# Patient Record
Sex: FEMALE | Race: WHITE | Employment: UNEMPLOYED
[De-identification: names, ages, dates, MRNs, and addresses within clinical notes are randomized per-mention and may not be internally consistent; named-entity substitution may affect disease eponyms.]

---

## 2017-12-05 PROBLEM — E55.9 VITAMIN D DEFICIENCY: Status: ACTIVE | Noted: 2017-12-05

## 2017-12-05 PROBLEM — E78.5 DYSLIPIDEMIA: Status: ACTIVE | Noted: 2017-12-05

## 2017-12-05 PROBLEM — R73.9 HYPERGLYCEMIA: Status: ACTIVE | Noted: 2017-12-05

## 2018-01-05 ENCOUNTER — NURSE TRIAGE (OUTPATIENT)
Dept: OTHER | Facility: CLINIC | Age: 53
End: 2018-01-05

## 2018-01-05 NOTE — TELEPHONE ENCOUNTER
Late Entry for Triage encounter called to [employer] [facility]. Please Refer to  for call information and disposition. Writer attempted patient contact x2.

## 2018-01-22 PROBLEM — J44.9 CHRONIC OBSTRUCTIVE PULMONARY DISEASE (HCC): Status: ACTIVE | Noted: 2018-01-22

## 2018-04-20 ENCOUNTER — TELEPHONE (OUTPATIENT)
Dept: FAMILY MEDICINE CLINIC | Age: 53
End: 2018-04-20

## 2018-05-09 RX ORDER — PREDNISONE 20 MG/1
40 TABLET ORAL DAILY
Qty: 10 TABLET | Refills: 0 | Status: SHIPPED | OUTPATIENT
Start: 2018-05-09 | End: 2018-05-14

## 2018-06-07 ENCOUNTER — HOSPITAL ENCOUNTER (OUTPATIENT)
Dept: GENERAL RADIOLOGY | Age: 53
Discharge: HOME | End: 2018-06-09
Payer: COMMERCIAL

## 2018-06-07 DIAGNOSIS — Z12.39 BREAST CANCER SCREENING: ICD-10-CM

## 2018-06-07 PROCEDURE — 77067 SCR MAMMO BI INCL CAD: CPT

## 2018-07-17 ENCOUNTER — HOSPITAL ENCOUNTER (OUTPATIENT)
Age: 53
Discharge: HOME | End: 2018-07-17
Payer: COMMERCIAL

## 2018-07-17 DIAGNOSIS — J44.9 CHRONIC OBSTRUCTIVE PULMONARY DISEASE, UNSPECIFIED COPD TYPE (HCC): ICD-10-CM

## 2018-07-17 DIAGNOSIS — E55.9 VITAMIN D DEFICIENCY: ICD-10-CM

## 2018-07-17 DIAGNOSIS — E78.5 DYSLIPIDEMIA: ICD-10-CM

## 2018-07-17 LAB
ALBUMIN SERPL-MCNC: 4.3 G/DL (ref 3.5–5.2)
ALP BLD-CCNC: 44 U/L (ref 35–104)
ALT SERPL-CCNC: 15 U/L (ref 0–32)
ANION GAP SERPL CALCULATED.3IONS-SCNC: 10 MMOL/L (ref 7–16)
AST SERPL-CCNC: 19 U/L (ref 0–31)
BILIRUB SERPL-MCNC: 0.3 MG/DL (ref 0–1.2)
BUN BLDV-MCNC: 22 MG/DL (ref 6–20)
CALCIUM SERPL-MCNC: 9.4 MG/DL (ref 8.6–10.2)
CHLORIDE BLD-SCNC: 107 MMOL/L (ref 98–107)
CHOLESTEROL, TOTAL: 154 MG/DL (ref 0–199)
CO2: 26 MMOL/L (ref 22–29)
CREAT SERPL-MCNC: 0.9 MG/DL (ref 0.5–1)
GFR AFRICAN AMERICAN: >60
GFR NON-AFRICAN AMERICAN: >60 ML/MIN/1.73
GLUCOSE BLD-MCNC: 94 MG/DL (ref 74–109)
HCT VFR BLD CALC: 41.6 % (ref 34–48)
HDLC SERPL-MCNC: 39 MG/DL
HEMOGLOBIN: 14.1 G/DL (ref 11.5–15.5)
LDL CHOLESTEROL CALCULATED: 96 MG/DL (ref 0–99)
MCH RBC QN AUTO: 30.1 PG (ref 26–35)
MCHC RBC AUTO-ENTMCNC: 33.9 % (ref 32–34.5)
MCV RBC AUTO: 88.9 FL (ref 80–99.9)
PDW BLD-RTO: 12.4 FL (ref 11.5–15)
PLATELET # BLD: 202 E9/L (ref 130–450)
PMV BLD AUTO: 11.1 FL (ref 7–12)
POTASSIUM SERPL-SCNC: 4.1 MMOL/L (ref 3.5–5)
RBC # BLD: 4.68 E12/L (ref 3.5–5.5)
SODIUM BLD-SCNC: 143 MMOL/L (ref 132–146)
TOTAL PROTEIN: 6.9 G/DL (ref 6.4–8.3)
TRIGL SERPL-MCNC: 93 MG/DL (ref 0–149)
VLDLC SERPL CALC-MCNC: 19 MG/DL
WBC # BLD: 4.8 E9/L (ref 4.5–11.5)

## 2018-07-17 PROCEDURE — 85027 COMPLETE CBC AUTOMATED: CPT

## 2018-07-17 PROCEDURE — 80053 COMPREHEN METABOLIC PANEL: CPT

## 2018-07-17 PROCEDURE — 80061 LIPID PANEL: CPT

## 2018-07-17 PROCEDURE — 82785 ASSAY OF IGE: CPT

## 2018-07-17 PROCEDURE — 36415 COLL VENOUS BLD VENIPUNCTURE: CPT

## 2018-07-17 PROCEDURE — 86003 ALLG SPEC IGE CRUDE XTRC EA: CPT

## 2018-07-24 ENCOUNTER — OFFICE VISIT (OUTPATIENT)
Dept: FAMILY MEDICINE CLINIC | Age: 53
End: 2018-07-24
Payer: COMMERCIAL

## 2018-07-24 VITALS
SYSTOLIC BLOOD PRESSURE: 118 MMHG | DIASTOLIC BLOOD PRESSURE: 70 MMHG | HEART RATE: 85 BPM | BODY MASS INDEX: 30.53 KG/M2 | HEIGHT: 66 IN | OXYGEN SATURATION: 97 % | WEIGHT: 190 LBS

## 2018-07-24 DIAGNOSIS — Z23 NEED FOR 23-POLYVALENT PNEUMOCOCCAL POLYSACCHARIDE VACCINE: ICD-10-CM

## 2018-07-24 DIAGNOSIS — Z91.030 BEE ALLERGY STATUS: ICD-10-CM

## 2018-07-24 DIAGNOSIS — R73.9 HYPERGLYCEMIA: ICD-10-CM

## 2018-07-24 DIAGNOSIS — G89.29 CHRONIC RIGHT-SIDED LOW BACK PAIN WITHOUT SCIATICA: ICD-10-CM

## 2018-07-24 DIAGNOSIS — E78.5 DYSLIPIDEMIA: Primary | ICD-10-CM

## 2018-07-24 DIAGNOSIS — E55.9 VITAMIN D DEFICIENCY: ICD-10-CM

## 2018-07-24 DIAGNOSIS — M54.50 CHRONIC RIGHT-SIDED LOW BACK PAIN WITHOUT SCIATICA: ICD-10-CM

## 2018-07-24 LAB
Lab: NORMAL
REPORT: NORMAL
THIS TEST SENT TO: NORMAL

## 2018-07-24 PROCEDURE — 99214 OFFICE O/P EST MOD 30 MIN: CPT | Performed by: FAMILY MEDICINE

## 2018-07-24 PROCEDURE — 90732 PPSV23 VACC 2 YRS+ SUBQ/IM: CPT | Performed by: FAMILY MEDICINE

## 2018-07-24 PROCEDURE — 90471 IMMUNIZATION ADMIN: CPT | Performed by: FAMILY MEDICINE

## 2018-07-24 RX ORDER — EPINEPHRINE 0.3 MG/.3ML
0.3 INJECTION SUBCUTANEOUS ONCE
Qty: 0.3 ML | Refills: 0 | Status: SHIPPED | OUTPATIENT
Start: 2018-07-24 | End: 2019-01-24

## 2018-07-24 RX ORDER — EPINEPHRINE 0.3 MG/.3ML
INJECTION SUBCUTANEOUS
Status: CANCELLED | OUTPATIENT
Start: 2018-07-24

## 2018-07-24 RX ORDER — PREGABALIN 100 MG/1
100 CAPSULE ORAL PRN
Qty: 30 CAPSULE | Refills: 2 | Status: SHIPPED | OUTPATIENT
Start: 2018-07-24 | End: 2019-04-17

## 2018-07-24 ASSESSMENT — ENCOUNTER SYMPTOMS
BACK PAIN: 1
ABDOMINAL PAIN: 0
BLOOD IN STOOL: 0
SHORTNESS OF BREATH: 0
WHEEZING: 0

## 2018-07-24 NOTE — PROGRESS NOTES
of pain. Admits to intermittent numbness down the right leg, none now. Denies leg weakness, saddle anesthesia, urinary symptoms. Review of Systems   Constitutional: Negative for chills, fatigue and unexpected weight change. Eyes: Visual disturbance: blurred. Respiratory: Negative for shortness of breath and wheezing. Cardiovascular: Negative for chest pain and palpitations. Gastrointestinal: Negative for abdominal pain and blood in stool. Endocrine: Negative for polydipsia, polyphagia and polyuria. Genitourinary: Negative for dysuria and frequency. Musculoskeletal: Positive for back pain. Neurological: Positive for numbness (left ankle). Negative for weakness. Psychiatric/Behavioral: Negative for dysphoric mood and sleep disturbance. The patient is not nervous/anxious. Health Maintenance Due   Topic Date Due    DTaP/Tdap/Td vaccine (1 - Tdap) 10/15/1985    Cervical cancer screen  10/15/1987    Shingles Vaccine (1 of 2 - 2 Dose Series) 10/15/2016     Pneumococcal: agreeable    Shingrix: 7/24/18    OBJECTIVE    /70 (Site: Right Arm, Position: Sitting, Cuff Size: Large Adult)   Pulse 85   Ht 5' 5.98\" (1.676 m)   Wt 190 lb (86.2 kg)   SpO2 97%   BMI 30.68 kg/m²     Wt Readings from Last 3 Encounters:   07/24/18 190 lb (86.2 kg)   02/07/18 211 lb (95.7 kg)   01/22/18 209 lb (94.8 kg)     Physical Exam   Constitutional: She is oriented to person, place, and time. No distress. HENT:   Head: Normocephalic and atraumatic. Right Ear: External ear normal.   Left Ear: External ear normal.   Nose: Nose normal.   Mouth/Throat: Oropharynx is clear and moist.   Eyes: Conjunctivae are normal.   Neck: Neck supple. No thyromegaly present. Cardiovascular: Normal rate, regular rhythm, normal heart sounds and intact distal pulses. Pulmonary/Chest: Effort normal and breath sounds normal. No respiratory distress. She has no rhonchi. Abdominal: Soft.  Bowel sounds are normal. She exhibits no mass. There is no tenderness. Musculoskeletal: Normal range of motion. She exhibits no edema. Lumbar back: She exhibits tenderness. She exhibits no deformity and no spasm. Neurological: She is alert and oriented to person, place, and time. She has normal strength and normal reflexes. No cranial nerve deficit or sensory deficit. Skin: Skin is warm and dry. She is not diaphoretic. Psychiatric: She has a normal mood and affect. Her speech is normal.   Osteopathic: Normal kyphotic and lordotic curves. No acute somatic dysfunction of the cervical, thoracic, or lumbar spine. ASSESSMENT AND PLAN    1. Dyslipidemia  Significant improvement since improving diet, weight loss. Recommend that she decrease red yeast rice extract dose by 50%. Continue exercise and control diet. Obtain relevant lab work for review next visit. - CBC Auto Differential; Future  - Comprehensive Metabolic Panel; Future  - Lipid Panel; Future    2. Vitamin D deficiency  Controlled, continue current treatment. - Comprehensive Metabolic Panel; Future    3. Need for 23-polyvalent pneumococcal polysaccharide vaccine  - Pneumococcal polysaccharide vaccine 23-valent greater than or equal to 1yo subcutaneous/IM    4. Hyperglycemia  Obtain relevant lab work for review next visit. - Comprehensive Metabolic Panel; Future    5. Chronic right-sided low back pain without sciatica  Continue current treatment for now and refer to pain management  - pregabalin (LYRICA) 100 MG capsule; Take 1 capsule by mouth as needed (back pain) for up to 90 days. .  Dispense: 30 capsule; Refill: 2  - Melissa Hutchinson MD    6. Bee allergy status  Refill EpiPen per patient request  - EPINEPHrine (EPIPEN 2-YURIY) 0.3 MG/0.3ML SOAJ injection; Inject 0.3 mLs into the muscle once for 1 dose Use as directed for allergic reaction  Dispense: 0.3 mL; Refill: 0    Return in about 6 months (around 1/24/2019) for lab review, or sooner as needed.     Call or go to ED immediately if symptoms worsen or persist.    Educational materials and/or home exercises printed for patient's review and were included in patient instructions on his/her After Visit Summary and given to patient at the end of visit.       Counseled regarding above diagnosis, including possible risks and complications, especially if left uncontrolled.     Counseled regarding the possible side effects, risks, benefits and alternatives to treatment; patient and/or guardian verbalizes understanding, agrees, feels comfortable with and wishes to proceed with above treatment plan.     Advised patient to call with any new medication issues, and read all Rx info from pharmacy to assure aware of all possible risks and side effects of medication before taking.     Reviewed age and gender appropriate health screening exams and vaccinations. Advised patient regarding importance of keeping up with recommended health maintenance and to schedule as soon as possible if overdue, as this is important in assessing for undiagnosed pathology, especially cancer, as well as protecting against potentially harmful/life threatening disease.       Black Ivory,   07/24/18  9:24 PM

## 2018-08-10 ENCOUNTER — OFFICE VISIT (OUTPATIENT)
Dept: PAIN MANAGEMENT | Age: 53
End: 2018-08-10
Payer: COMMERCIAL

## 2018-08-10 ENCOUNTER — HOSPITAL ENCOUNTER (OUTPATIENT)
Dept: GENERAL RADIOLOGY | Age: 53
Discharge: HOME | End: 2018-08-12
Payer: COMMERCIAL

## 2018-08-10 ENCOUNTER — HOSPITAL ENCOUNTER (OUTPATIENT)
Age: 53
Discharge: HOME | End: 2018-08-12
Payer: COMMERCIAL

## 2018-08-10 VITALS
HEART RATE: 84 BPM | SYSTOLIC BLOOD PRESSURE: 120 MMHG | DIASTOLIC BLOOD PRESSURE: 72 MMHG | WEIGHT: 184 LBS | RESPIRATION RATE: 18 BRPM | HEIGHT: 66 IN | BODY MASS INDEX: 29.57 KG/M2 | TEMPERATURE: 98.3 F

## 2018-08-10 DIAGNOSIS — M47.816 LUMBAR SPONDYLOSIS: ICD-10-CM

## 2018-08-10 DIAGNOSIS — M51.9 LUMBAR DISC DISORDER: ICD-10-CM

## 2018-08-10 DIAGNOSIS — M47.816 LUMBAR FACET ARTHROPATHY: Primary | ICD-10-CM

## 2018-08-10 DIAGNOSIS — G89.4 CHRONIC PAIN SYNDROME: ICD-10-CM

## 2018-08-10 DIAGNOSIS — M47.816 LUMBAR FACET ARTHROPATHY: ICD-10-CM

## 2018-08-10 PROCEDURE — 72100 X-RAY EXAM L-S SPINE 2/3 VWS: CPT

## 2018-08-10 PROCEDURE — 99204 OFFICE O/P NEW MOD 45 MIN: CPT | Performed by: PAIN MEDICINE

## 2018-08-10 PROCEDURE — 99203 OFFICE O/P NEW LOW 30 MIN: CPT | Performed by: PAIN MEDICINE

## 2018-08-10 NOTE — PROGRESS NOTES
8/10/2018    Windy Murillo presents to the [address] [place] [address] on 8/10/2018. Lauro Fernandes is complaining of pain in the lower back . The pain is constant. The pain is described as stabbing. Pain is rated today at a 6 on the VAS scale. She took her last dose of lyrica  on 08/09/2018.       She has not been on anticoagulation medication  /72   Pulse 84   Temp 98.3 °F (36.8 °C)   Resp 18   Ht 5' 6\" (1.676 m)   Wt 184 lb (83.5 kg)   BMI 29.70 kg/m²

## 2018-08-10 NOTE — PROGRESS NOTES
[address]        [address] [facility], [address] [facility], [place]      [phone]          Consult Note      Patient:  Mikayla Guerrero DOB 1965    Date of Service:  8/10/18    Requesting Physician:  Fran Maxwell DO    Reason for Consult:      Patient presents with complaints of low back pain that started 4 years ago and has been the same    HISTORY OF PRESENT ILLNESS:      Pain is constant and is described as aching and stabbing. Pain does radiate to right thigh (posterior aspect) down to the knee . She  does not have numbness, tingling, weakness and does not have bladder or bowel dysfunction. Alleviating factors include: ice, OTC NSAIDS and excercise. Aggravating factors include:  sitting. She has not been on anticoagulation medications to include none and has not been on herbal supplements. She is not diabetic. Imaging: none     Previous treatments: Physical Therapy and medications. .      Past Medical History:   Diagnosis Date    Anxiety     Asthma     Depression     GERD (gastroesophageal reflux disease)        Past Surgical History:   Procedure Laterality Date    ANKLE SURGERY Left     APPENDECTOMY  1986    GALLBLADDER SURGERY      HYSTERECTOMY      SHOULDER SURGERY Right        Prior to Admission medications    Medication Sig Start Date End Date Taking? Authorizing Provider   pregabalin (LYRICA) 100 MG capsule Take 1 capsule by mouth as needed (back pain) for up to 90 days. . 7/24/18 10/22/18 Yes Janet Kussmaul,    pantoprazole (PROTONIX) 40 MG tablet Take 1 tablet by mouth daily 18  Yes Jass Bui, DO   budesonide-formoterol (SYMBICORT) 160-4.5 MCG/ACT AERO Inhale 2 puffs into the lungs 2 times daily 18  Yes Jass Bui DO   albuterol sulfate HFA (PROVENTIL HFA) 108 (90 Base) MCG/ACT inhaler Inhale 2 puffs into the lungs every 6 hours as needed for Wheezing 18  Yes Janet Kussmaul, DO traZODone (DESYREL) 100 MG tablet Take 100 mg by mouth 2 times daily   Yes Historical Provider, MD   Coenzyme Q10 (COQ-10) 200 MG CAPS Take by mouth   Yes Historical Provider, MD   Red Yeast Rice 600 MG CAPS Take by mouth   Yes Historical Provider, MD   Lactobacillus (ACIDOPHILUS/PECTIN PO) Take by mouth   Yes Historical Provider, MD   Omega-3 Fatty Acids (FISH OIL) 1200 MG CPDR Take by mouth   Yes Historical Provider, MD   Cholecalciferol (VITAMIN D3) 2000 units CAPS Take by mouth   Yes Historical Provider, MD   EPINEPHrine (EPIPEN 2-YURIY) 0.3 MG/0.3ML SOAJ injection as needed. 5/10/11  Yes Historical Provider, MD   venlafaxine (EFFEXOR XR) 75 MG extended release capsule Take 225 mg by mouth 11/6/12  Yes Historical Provider, MD   busPIRone (BUSPAR) 15 MG tablet Take 15 mg by mouth 3 times daily   Yes Historical Provider, MD   LORazepam (ATIVAN) 1 MG tablet Take 1 mg by mouth every 6 hours as needed for Anxiety .    Yes Historical Provider, MD   EPINEPHrine (EPIPEN 2-YURIY) 0.3 MG/0.3ML SOAJ injection Inject 0.3 mLs into the muscle once for 1 dose Use as directed for allergic reaction 7/24/18 7/24/18  Jass Bui DO   fluticasone (FLONASE) 50 MCG/ACT nasal spray 1 spray by Nasal route daily One spray each nostril daily 1/22/18   Jass Bui DO   predniSONE (DELTASONE) 20 MG tablet Take 20 mg by mouth daily    Historical Provider, MD       Allergies   Allergen Reactions    Sulfa Antibiotics Itching    Vancomycin Itching    Penicillins Rash       Social History     Social History    Marital status:      Spouse name: N/A    Number of children: 2    Years of education: N/A     Occupational History   [employer]     Social History Main Topics    Smoking status: Never Smoker    Smokeless tobacco: Never Used    Alcohol use Yes    Drug use: No    Sexual activity: Not on file     Other Topics Concern    Not on file     Social History Narrative    No narrative on file Right Gastrocnemius5/5    Left Gastrocnemius5/5  Right Ant Tibialis5/5  Left Ant Tibialis5/5  Reflexes:    Right Quadriceps reflex2+  Left Quadriceps reflex2+  Right Achilles reflex2+  Left Achilles reflex2+  Gait:normal    Dermatology:    Skin:no unusual rashes and no skin lesions    Assessment/Plan:  Chronic low back pain with radiation to the Right thigh (posterior aspect) down to the knee, axial in nature   Will schedule patient for lumbar spine Xray, will consider diagnostic Right L3,4 MB and L5 DR block  Patient is currently on Lyrica and she is taking PRN, discussed with the patient that it needs to be taken consistently everyday  Continue with the OTC pain medications   Urine screen today  OARRS report reviewed  Patient encouraged to stay active and to lose weight  Treatment plan discussed with the patient including medications and procedure side effects       ccreferring phyllis Tubbs M.D.

## 2018-08-20 ENCOUNTER — OFFICE VISIT (OUTPATIENT)
Dept: PAIN MANAGEMENT | Age: 53
End: 2018-08-20
Payer: COMMERCIAL

## 2018-08-20 ENCOUNTER — PREP FOR PROCEDURE (OUTPATIENT)
Dept: PAIN MANAGEMENT | Age: 53
End: 2018-08-20

## 2018-08-20 VITALS
DIASTOLIC BLOOD PRESSURE: 76 MMHG | OXYGEN SATURATION: 97 % | HEART RATE: 80 BPM | SYSTOLIC BLOOD PRESSURE: 116 MMHG | TEMPERATURE: 98.5 F | RESPIRATION RATE: 16 BRPM

## 2018-08-20 DIAGNOSIS — M47.816 LUMBAR SPONDYLOSIS: ICD-10-CM

## 2018-08-20 DIAGNOSIS — M47.816 LUMBAR FACET ARTHROPATHY: Primary | ICD-10-CM

## 2018-08-20 DIAGNOSIS — G89.4 CHRONIC PAIN SYNDROME: ICD-10-CM

## 2018-08-20 DIAGNOSIS — M51.9 LUMBAR DISC DISORDER: ICD-10-CM

## 2018-08-20 PROCEDURE — 99213 OFFICE O/P EST LOW 20 MIN: CPT | Performed by: PAIN MEDICINE

## 2018-08-20 PROCEDURE — 99214 OFFICE O/P EST MOD 30 MIN: CPT | Performed by: PAIN MEDICINE

## 2018-08-20 RX ORDER — SODIUM CHLORIDE 0.9 % (FLUSH) 0.9 %
10 SYRINGE (ML) INJECTION EVERY 12 HOURS SCHEDULED
Status: CANCELLED | OUTPATIENT
Start: 2018-08-20 | End: 2019-08-20

## 2018-08-20 RX ORDER — SODIUM CHLORIDE 0.9 % (FLUSH) 0.9 %
10 SYRINGE (ML) INJECTION PRN
Status: CANCELLED | OUTPATIENT
Start: 2018-08-20 | End: 2019-08-20

## 2018-08-20 NOTE — PROGRESS NOTES
2018    Arti  presents to the [address] [place] [address] on 2018. Aisha Whitehead is complaining of pain lower R back. . The pain is constant. The pain is described as aching, throbbing and stabbing. Pain is rated today at a 4 on the VAS scale.  She took her last dose of Advil 800 mg this AM.      She has not been on anticoagulation medications to include none and is managed by NA.      /76   Pulse 80   Temp 98.5 °F (36.9 °C) (Oral)   Resp 16   SpO2 97%

## 2018-08-20 NOTE — PROGRESS NOTES
lungs every 6 hours as needed for Wheezing 1/22/18  Yes Alicia Carrillo,    traZODone (DESYREL) 100 MG tablet Take 100 mg by mouth 2 times daily   Yes Historical Provider, MD   predniSONE (DELTASONE) 20 MG tablet Take 20 mg by mouth daily   Yes Historical Provider, MD   Coenzyme Q10 (COQ-10) 200 MG CAPS Take by mouth   Yes Historical Provider, MD   Red Yeast Rice 600 MG CAPS Take by mouth   Yes Historical Provider, MD   Lactobacillus (ACIDOPHILUS/PECTIN PO) Take by mouth   Yes Historical Provider, MD   Omega-3 Fatty Acids (FISH OIL) 1200 MG CPDR Take by mouth   Yes Historical Provider, MD   Cholecalciferol (VITAMIN D3) 2000 units CAPS Take by mouth   Yes Historical Provider, MD   EPINEPHrine (EPIPEN 2-YURIY) 0.3 MG/0.3ML SOAJ injection as needed. 5/10/11  Yes Historical Provider, MD   venlafaxine (EFFEXOR XR) 75 MG extended release capsule Take 225 mg by mouth 11/6/12  Yes Historical Provider, MD   busPIRone (BUSPAR) 15 MG tablet Take 15 mg by mouth 3 times daily   Yes Historical Provider, MD   LORazepam (ATIVAN) 1 MG tablet Take 1 mg by mouth every 6 hours as needed for Anxiety .    Yes Historical Provider, MD   EPINEPHrine (EPIPEN 2-YURIY) 0.3 MG/0.3ML SOAJ injection Inject 0.3 mLs into the muscle once for 1 dose Use as directed for allergic reaction 7/24/18 7/24/18  Jass Bui,        Allergies   Allergen Reactions    Sulfa Antibiotics Itching    Vancomycin Itching    Penicillins Rash       Social History     Social History    Marital status:      Spouse name: N/A    Number of children: 2    Years of education: N/A     Occupational History   [employer]     Social History Main Topics    Smoking status: Never Smoker    Smokeless tobacco: Never Used    Alcohol use Yes    Drug use: No    Sexual activity: Not on file     Other Topics Concern    Not on file     Social History Narrative    No narrative on file       Family History   Problem Relation Age of Onset    No Known Problems Mother    Mauro Tong Father 66        Colon    Other Father         AAA    Hypertension Father     Cancer Brother 47        Lung    COPD Brother        REVIEW OF SYSTEMS:     Tessa Marrufo denies fever/chills, chest pain, shortness of breath, new bowel or bladder complaints. All other review of systems was negative. PHYSICAL EXAMINATION:      /76   Pulse 80   Temp 98.5 °F (36.9 °C) (Oral)   Resp 16   SpO2 97%        General:       General appearance:   pleasant and well-hydrated. , in moderate distress and A & O x3  Build:Overweight  Function:Rises from a seated position easily.     HEENT:     Head:normocephalic and atraumatic  Pupils:regular, round and equal.  Sclera: icterus absent,      Lungs:     Breathing:Breathing Pattern: regular, no distress     Abdomen:     Shape:non-distended and normal  Tenderness:none  Guarding:none     Lumbar spine:     Spine inspection:normal   CVA tenderness:No   Palpation:tenderness paravertebral muscles, facet loading, right, positive and tenderness. Range of motion:abnormal moderately Lateral bending, flexion, extension rotation right and is  painful.     Musculoskeletal:     Trigger points in Paraveteral:absent bilaterally  SI joint tenderness:negative right, negative left              MILE test:not done right, not done             left  Piriformis tenderness:negative right, negative left  Trochanteric bursa tenderness:negative right, negative left  SLR:negative right, negative left, sitting      Extremities:     Tremors:None bilaterally upper and lower  Range of motion: pain with internal rotation of hips negative.   Intact:Yes  Varicose veins:absent   Edema:Normal     Neurological:     Sensory:normal to joint position sense and light touch bilateral lower extremities   Motor:                 Right Quadriceps5/5          Left Quadriceps5/5           Right Gastrocnemius5/5    Left Gastrocnemius5/5  Right Ant Tibialis5/5  Left Ant Tibialis5/5  Reflexes:

## 2018-08-21 ENCOUNTER — PREP FOR PROCEDURE (OUTPATIENT)
Dept: PAIN MANAGEMENT | Age: 53
End: 2018-08-21

## 2018-09-08 ENCOUNTER — HOSPITAL ENCOUNTER (OUTPATIENT)
Age: 53
Discharge: HOME | End: 2018-09-08
Payer: COMMERCIAL

## 2018-09-08 LAB
C-REACTIVE PROTEIN: 0.3 MG/DL (ref 0–0.4)
RHEUMATOID FACTOR: <10 IU/ML (ref 0–13)
SEDIMENTATION RATE, ERYTHROCYTE: 6 MM/HR (ref 0–20)

## 2018-09-08 PROCEDURE — 86431 RHEUMATOID FACTOR QUANT: CPT

## 2018-09-08 PROCEDURE — 86038 ANTINUCLEAR ANTIBODIES: CPT

## 2018-09-08 PROCEDURE — 86200 CCP ANTIBODY: CPT

## 2018-09-08 PROCEDURE — 86140 C-REACTIVE PROTEIN: CPT

## 2018-09-08 PROCEDURE — 36415 COLL VENOUS BLD VENIPUNCTURE: CPT

## 2018-09-08 PROCEDURE — 85651 RBC SED RATE NONAUTOMATED: CPT

## 2018-09-10 LAB — ANTI-NUCLEAR ANTIBODY (ANA): NEGATIVE

## 2018-09-11 LAB — CCP IGG ANTIBODIES: 3 UNITS (ref 0–19)

## 2018-09-20 DIAGNOSIS — J06.9 VIRAL URI: ICD-10-CM

## 2018-09-20 DIAGNOSIS — J44.9 CHRONIC OBSTRUCTIVE PULMONARY DISEASE, UNSPECIFIED COPD TYPE (HCC): ICD-10-CM

## 2018-09-20 RX ORDER — PANTOPRAZOLE SODIUM 40 MG/1
40 TABLET, DELAYED RELEASE ORAL DAILY
Qty: 90 TABLET | Refills: 1 | Status: SHIPPED | OUTPATIENT
Start: 2018-09-20 | End: 2019-01-24 | Stop reason: SDUPTHER

## 2018-09-20 RX ORDER — BUDESONIDE AND FORMOTEROL FUMARATE DIHYDRATE 160; 4.5 UG/1; UG/1
2 AEROSOL RESPIRATORY (INHALATION) 2 TIMES DAILY
Qty: 1 INHALER | Refills: 3 | Status: SHIPPED
Start: 2018-09-20 | End: 2020-02-13 | Stop reason: CLARIF

## 2018-09-20 NOTE — TELEPHONE ENCOUNTER
From: Tonya Bernal  Sent: 9/20/2018 11:58 AM EDT  Subject: Medication Renewal Request    Tonya Bernal would like a refill of the following medications:     budesonide-formoterol (SYMBICORT) 160-4.5 MCG/ACT AERO [Jass Bui, DO]     pantoprazole (PROTONIX) 40 MG tablet Camilla Yoo DO]    Preferred pharmacy: [address] [place] [address].  Betty Herzog [phone] - F [phone]    Comment:

## 2018-12-01 PROBLEM — G47.33 OSA (OBSTRUCTIVE SLEEP APNEA): Status: ACTIVE | Noted: 2018-12-01

## 2018-12-13 ENCOUNTER — OFFICE VISIT (OUTPATIENT)
Dept: FAMILY MEDICINE CLINIC | Age: 53
End: 2018-12-13
Payer: COMMERCIAL

## 2018-12-13 VITALS
OXYGEN SATURATION: 97 % | DIASTOLIC BLOOD PRESSURE: 72 MMHG | HEIGHT: 66 IN | SYSTOLIC BLOOD PRESSURE: 116 MMHG | BODY MASS INDEX: 28.93 KG/M2 | HEART RATE: 78 BPM | WEIGHT: 180 LBS

## 2018-12-13 DIAGNOSIS — G47.33 OSA (OBSTRUCTIVE SLEEP APNEA): Primary | ICD-10-CM

## 2018-12-13 PROCEDURE — 99213 OFFICE O/P EST LOW 20 MIN: CPT | Performed by: FAMILY MEDICINE

## 2018-12-13 ASSESSMENT — ENCOUNTER SYMPTOMS
WHEEZING: 0
SHORTNESS OF BREATH: 0

## 2018-12-13 ASSESSMENT — PATIENT HEALTH QUESTIONNAIRE - PHQ9
SUM OF ALL RESPONSES TO PHQ QUESTIONS 1-9: 0
SUM OF ALL RESPONSES TO PHQ9 QUESTIONS 1 & 2: 0
SUM OF ALL RESPONSES TO PHQ QUESTIONS 1-9: 0
1. LITTLE INTEREST OR PLEASURE IN DOING THINGS: 0
2. FEELING DOWN, DEPRESSED OR HOPELESS: 0

## 2019-01-18 ENCOUNTER — HOSPITAL ENCOUNTER (OUTPATIENT)
Age: 54
Discharge: HOME | End: 2019-01-20
Payer: COMMERCIAL

## 2019-01-18 DIAGNOSIS — E55.9 VITAMIN D DEFICIENCY: ICD-10-CM

## 2019-01-18 DIAGNOSIS — R73.9 HYPERGLYCEMIA: ICD-10-CM

## 2019-01-18 DIAGNOSIS — E78.5 DYSLIPIDEMIA: ICD-10-CM

## 2019-01-18 LAB
ALBUMIN SERPL-MCNC: 4.4 G/DL (ref 3.5–5.2)
ALP BLD-CCNC: 48 U/L (ref 35–104)
ALT SERPL-CCNC: 15 U/L (ref 0–32)
ANION GAP SERPL CALCULATED.3IONS-SCNC: 18 MMOL/L (ref 7–16)
AST SERPL-CCNC: 19 U/L (ref 0–31)
BASOPHILS ABSOLUTE: 0.03 E9/L (ref 0–0.2)
BASOPHILS RELATIVE PERCENT: 0.5 % (ref 0–2)
BILIRUB SERPL-MCNC: 0.3 MG/DL (ref 0–1.2)
BUN BLDV-MCNC: 21 MG/DL (ref 6–20)
CALCIUM SERPL-MCNC: 9 MG/DL (ref 8.6–10.2)
CHLORIDE BLD-SCNC: 105 MMOL/L (ref 98–107)
CHOLESTEROL, TOTAL: 176 MG/DL (ref 0–199)
CO2: 24 MMOL/L (ref 22–29)
CREAT SERPL-MCNC: 0.9 MG/DL (ref 0.5–1)
EOSINOPHILS ABSOLUTE: 0.23 E9/L (ref 0.05–0.5)
EOSINOPHILS RELATIVE PERCENT: 4 % (ref 0–6)
GFR AFRICAN AMERICAN: >60
GFR NON-AFRICAN AMERICAN: >60 ML/MIN/1.73
GLUCOSE BLD-MCNC: 92 MG/DL (ref 74–99)
HCT VFR BLD CALC: 42.4 % (ref 34–48)
HDLC SERPL-MCNC: 52 MG/DL
HEMOGLOBIN: 13.7 G/DL (ref 11.5–15.5)
IMMATURE GRANULOCYTES #: 0.02 E9/L
IMMATURE GRANULOCYTES %: 0.3 % (ref 0–5)
LDL CHOLESTEROL CALCULATED: 108 MG/DL (ref 0–99)
LYMPHOCYTES ABSOLUTE: 2.42 E9/L (ref 1.5–4)
LYMPHOCYTES RELATIVE PERCENT: 41.7 % (ref 20–42)
MCH RBC QN AUTO: 30.2 PG (ref 26–35)
MCHC RBC AUTO-ENTMCNC: 32.3 % (ref 32–34.5)
MCV RBC AUTO: 93.4 FL (ref 80–99.9)
MONOCYTES ABSOLUTE: 0.39 E9/L (ref 0.1–0.95)
MONOCYTES RELATIVE PERCENT: 6.7 % (ref 2–12)
NEUTROPHILS ABSOLUTE: 2.72 E9/L (ref 1.8–7.3)
NEUTROPHILS RELATIVE PERCENT: 46.8 % (ref 43–80)
PDW BLD-RTO: 12.7 FL (ref 11.5–15)
PLATELET # BLD: 220 E9/L (ref 130–450)
PMV BLD AUTO: 11.1 FL (ref 7–12)
POTASSIUM SERPL-SCNC: 4 MMOL/L (ref 3.5–5)
RBC # BLD: 4.54 E12/L (ref 3.5–5.5)
SODIUM BLD-SCNC: 147 MMOL/L (ref 132–146)
TOTAL PROTEIN: 6.8 G/DL (ref 6.4–8.3)
TRIGL SERPL-MCNC: 78 MG/DL (ref 0–149)
VLDLC SERPL CALC-MCNC: 16 MG/DL
WBC # BLD: 5.8 E9/L (ref 4.5–11.5)

## 2019-01-18 PROCEDURE — 36415 COLL VENOUS BLD VENIPUNCTURE: CPT

## 2019-01-18 PROCEDURE — 80053 COMPREHEN METABOLIC PANEL: CPT

## 2019-01-18 PROCEDURE — 85025 COMPLETE CBC W/AUTO DIFF WBC: CPT

## 2019-01-18 PROCEDURE — 80061 LIPID PANEL: CPT

## 2019-01-24 ENCOUNTER — OFFICE VISIT (OUTPATIENT)
Dept: FAMILY MEDICINE CLINIC | Age: 54
End: 2019-01-24
Payer: COMMERCIAL

## 2019-01-24 VITALS
BODY MASS INDEX: 27.8 KG/M2 | WEIGHT: 173 LBS | DIASTOLIC BLOOD PRESSURE: 68 MMHG | HEART RATE: 79 BPM | OXYGEN SATURATION: 96 % | HEIGHT: 66 IN | SYSTOLIC BLOOD PRESSURE: 112 MMHG

## 2019-01-24 DIAGNOSIS — Z76.0 MEDICATION REFILL: ICD-10-CM

## 2019-01-24 DIAGNOSIS — E78.5 DYSLIPIDEMIA: Primary | ICD-10-CM

## 2019-01-24 DIAGNOSIS — K21.9 GASTROESOPHAGEAL REFLUX DISEASE, ESOPHAGITIS PRESENCE NOT SPECIFIED: ICD-10-CM

## 2019-01-24 DIAGNOSIS — G47.33 OSA (OBSTRUCTIVE SLEEP APNEA): ICD-10-CM

## 2019-01-24 DIAGNOSIS — E55.9 VITAMIN D DEFICIENCY: ICD-10-CM

## 2019-01-24 PROCEDURE — 99214 OFFICE O/P EST MOD 30 MIN: CPT | Performed by: FAMILY MEDICINE

## 2019-01-24 RX ORDER — PREGABALIN 100 MG/1
100 CAPSULE ORAL 2 TIMES DAILY PRN
COMMUNITY
End: 2019-01-24 | Stop reason: SDUPTHER

## 2019-01-24 RX ORDER — PANTOPRAZOLE SODIUM 40 MG/1
40 TABLET, DELAYED RELEASE ORAL DAILY
Qty: 90 TABLET | Refills: 1 | Status: SHIPPED
Start: 2019-01-24 | End: 2020-02-13 | Stop reason: ALTCHOICE

## 2019-01-24 RX ORDER — PREGABALIN 75 MG/1
75 CAPSULE ORAL 2 TIMES DAILY
Qty: 180 CAPSULE | Refills: 1 | Status: SHIPPED | OUTPATIENT
Start: 2019-01-24 | End: 2019-07-25

## 2019-01-24 ASSESSMENT — ENCOUNTER SYMPTOMS
BACK PAIN: 1
ABDOMINAL PAIN: 0
SHORTNESS OF BREATH: 0
WHEEZING: 0
BLOOD IN STOOL: 0

## 2019-02-01 ENCOUNTER — HOSPITAL ENCOUNTER (OUTPATIENT)
Age: 54
Discharge: HOME | End: 2019-02-03
Payer: COMMERCIAL

## 2019-02-01 PROCEDURE — 88175 CYTOPATH C/V AUTO FLUID REDO: CPT

## 2019-02-06 ENCOUNTER — HOSPITAL ENCOUNTER (OUTPATIENT)
Dept: SLEEP CENTER | Age: 54
Discharge: HOME | End: 2019-02-06
Payer: COMMERCIAL

## 2019-02-06 DIAGNOSIS — G47.33 OSA (OBSTRUCTIVE SLEEP APNEA): Primary | ICD-10-CM

## 2019-02-06 PROCEDURE — 95811 POLYSOM 6/>YRS CPAP 4/> PARM: CPT

## 2019-04-17 ENCOUNTER — OFFICE VISIT (OUTPATIENT)
Dept: FAMILY MEDICINE CLINIC | Age: 54
End: 2019-04-17
Payer: COMMERCIAL

## 2019-04-17 VITALS
HEART RATE: 75 BPM | WEIGHT: 176 LBS | BODY MASS INDEX: 28.28 KG/M2 | DIASTOLIC BLOOD PRESSURE: 62 MMHG | HEIGHT: 66 IN | OXYGEN SATURATION: 98 % | SYSTOLIC BLOOD PRESSURE: 108 MMHG

## 2019-04-17 DIAGNOSIS — K21.9 GASTROESOPHAGEAL REFLUX DISEASE, ESOPHAGITIS PRESENCE NOT SPECIFIED: Primary | ICD-10-CM

## 2019-04-17 DIAGNOSIS — R10.13 EPIGASTRIC PAIN: ICD-10-CM

## 2019-04-17 PROCEDURE — 99213 OFFICE O/P EST LOW 20 MIN: CPT | Performed by: FAMILY MEDICINE

## 2019-04-17 PROCEDURE — 93000 ELECTROCARDIOGRAM COMPLETE: CPT | Performed by: FAMILY MEDICINE

## 2019-04-17 RX ORDER — RANITIDINE 150 MG/1
150 TABLET ORAL NIGHTLY
Qty: 30 TABLET | Refills: 5 | Status: SHIPPED | OUTPATIENT
Start: 2019-04-17 | End: 2019-04-24 | Stop reason: SDUPTHER

## 2019-04-17 RX ORDER — SUCRALFATE 1 G/1
TABLET ORAL
Qty: 90 TABLET | Refills: 0 | Status: SHIPPED | OUTPATIENT
Start: 2019-04-17 | End: 2019-04-24

## 2019-04-17 ASSESSMENT — ENCOUNTER SYMPTOMS
DIARRHEA: 0
BLOOD IN STOOL: 0
BACK PAIN: 1
WHEEZING: 0
ABDOMINAL PAIN: 1
SHORTNESS OF BREATH: 0

## 2019-04-17 ASSESSMENT — PATIENT HEALTH QUESTIONNAIRE - PHQ9
SUM OF ALL RESPONSES TO PHQ9 QUESTIONS 1 & 2: 1
SUM OF ALL RESPONSES TO PHQ QUESTIONS 1-9: 1
SUM OF ALL RESPONSES TO PHQ QUESTIONS 1-9: 1
1. LITTLE INTEREST OR PLEASURE IN DOING THINGS: 1
2. FEELING DOWN, DEPRESSED OR HOPELESS: 0

## 2019-04-17 NOTE — PATIENT INSTRUCTIONS
Patient Education        Gastroesophageal Reflux Disease (GERD): Care Instructions  Your Care Instructions    Gastroesophageal reflux disease (GERD) is the backward flow of stomach acid into the esophagus. The esophagus is the tube that leads from your throat to your stomach. A one-way valve prevents the stomach acid from moving up into this tube. When you have GERD, this valve does not close tightly enough. If you have mild GERD symptoms including heartburn, you may be able to control the problem with antacids or over-the-counter medicine. Changing your diet, losing weight, and making other lifestyle changes can also help reduce symptoms. Follow-up care is a key part of your treatment and safety. Be sure to make and go to all appointments, and call your doctor if you are having problems. It's also a good idea to know your test results and keep a list of the medicines you take. How can you care for yourself at home? · Take your medicines exactly as prescribed. Call your doctor if you think you are having a problem with your medicine. · Your doctor may recommend over-the-counter medicine. For mild or occasional indigestion, antacids, such as Tums, Gaviscon, Mylanta, or Maalox, may help. Your doctor also may recommend over-the-counter acid reducers, such as Pepcid AC, Tagamet HB, Zantac 75, or Prilosec. Read and follow all instructions on the label. If you use these medicines often, talk with your doctor. · Change your eating habits. ? It's best to eat several small meals instead of two or three large meals. ? After you eat, wait 2 to 3 hours before you lie down. ? Chocolate, mint, and alcohol can make GERD worse. ? Spicy foods, foods that have a lot of acid (like tomatoes and oranges), and coffee can make GERD symptoms worse in some people. If your symptoms are worse after you eat a certain food, you may want to stop eating that food to see if your symptoms get better.   · Do not smoke or chew tobacco. Smoking can make GERD worse. If you need help quitting, talk to your doctor about stop-smoking programs and medicines. These can increase your chances of quitting for good. · If you have GERD symptoms at night, raise the head of your bed 6 to 8 inches by putting the frame on blocks or placing a foam wedge under the head of your mattress. (Adding extra pillows does not work.)  · Do not wear tight clothing around your middle. · Lose weight if you need to. Losing just 5 to 10 pounds can help. When should you call for help? Call your doctor now or seek immediate medical care if:    · You have new or different belly pain.     · Your stools are black and tarlike or have streaks of blood.    Watch closely for changes in your health, and be sure to contact your doctor if:    · Your symptoms have not improved after 2 days.     · Food seems to catch in your throat or chest.   Where can you learn more? Go to https://[employer]. org and sign in to your [employer] account. Enter O936 in the [facility] box to learn more about \"Gastroesophageal Reflux Disease (GERD): Care Instructions. \"     If you do not have an account, please click on the \"Sign Up Now\" link. Current as of: March 27, 2018  Content Version: 11.9  © 4972-1964 [employer]. Care instructions adapted under license by [facility] [address] If you have questions about a medical condition or this instruction, always ask your healthcare professional. [place] [address] any warranty or liability for your use of this information.

## 2019-04-17 NOTE — PROGRESS NOTES
Adrián Rojas   Patient is a 47y.o. year old female who presents with:  Chief Complaint   Patient presents with    Gastroesophageal Reflux     Buring is worse X3 weeks.  Health Maintenance     Refused Dtap     HPI    Abdominal pain  Current treatment: pantoprazole 40mg daily   Recent changes in medication: none. Worsening over the past three weeks. Burning in character. Located at the epigastrium. TUMS had no effect, extra protonix was mildly helpful. Exacerbated with spicy, acidic foods. Worse after eating, drinking anything. Some pain in the back. Some diaphoresis last night. No blood in the stool, no diarrhea. No CP, leg swelling, palpitations. Taking IB 200mg x 4 once per day. Patient last had EGD 2010 which was unremarkable. Review of Systems   Constitutional: Positive for diaphoresis. Negative for chills, fatigue and unexpected weight change. Respiratory: Negative for shortness of breath and wheezing. Cardiovascular: Negative for chest pain, palpitations and leg swelling. Gastrointestinal: Positive for abdominal pain. Negative for blood in stool and diarrhea. Genitourinary: Negative for dysuria and frequency. Musculoskeletal: Positive for back pain. Health Maintenance Due   Topic Date Due    DTaP/Tdap/Td vaccine (1 - Tdap) 10/15/1985    Shingles Vaccine (1 of 2) 10/15/2016     Shingrix: discussed recommendation 7/24/18    OBJECTIVE    /62 (Site: Right Upper Arm, Position: Sitting, Cuff Size: Medium Adult)   Pulse 75   Ht 5' 6\" (1.676 m)   Wt 176 lb (79.8 kg)   SpO2 98%   BMI 28.41 kg/m²     Wt Readings from Last 3 Encounters:   04/17/19 176 lb (79.8 kg)   01/24/19 173 lb (78.5 kg)   12/13/18 180 lb (81.6 kg)     Physical Exam   Constitutional: She is oriented to person, place, and time. No distress. HENT:   Head: Normocephalic and atraumatic.    Right Ear: External ear normal.   Left Ear: External ear normal.   Eyes: Conjunctivae are normal. Neck: Neck supple. Carotid bruit is not present. No thyromegaly present. Cardiovascular: Normal rate, regular rhythm, normal heart sounds and intact distal pulses. Pulmonary/Chest: Effort normal and breath sounds normal. She has no rhonchi. Abdominal: Soft. Bowel sounds are normal. There is tenderness in the epigastric area. There is no rigidity and no guarding. Musculoskeletal: Normal range of motion. She exhibits no edema. Neurological: She is alert and oriented to person, place, and time. No cranial nerve deficit. Skin: Skin is warm and dry. She is not diaphoretic. Psychiatric: She has a normal mood and affect. Her speech is normal and behavior is normal.     ASSESSMENT AND PLAN    1. Gastroesophageal reflux disease, esophagitis presence not specified  Begin sucralfate x one month, begin ranitidine nightly. Avoid NSAIDs. Provided with relevant printed material. Discussed expected clinical course and s/s for which to call vs seek emergent medical attention. Pending course will consider need for EGD. - sucralfate (CARAFATE) 1 GM tablet; Take 1 tablet three times daily. Take one hour before meals. Dispense: 90 tablet; Refill: 0  - ranitidine (ZANTAC) 150 MG tablet; Take 1 tablet by mouth nightly  Dispense: 30 tablet; Refill: 5    2. Epigastric pain  - EKG 12 Lead; Future  - EKG 12 Lead    Return for follow-up from previous visit, or sooner as needed. , Discussed s/s for which to call vs go to ED. Ciarra Hurst DO  04/17/19  6:29 PM

## 2019-04-22 ENCOUNTER — TELEPHONE (OUTPATIENT)
Dept: FAMILY MEDICINE CLINIC | Age: 54
End: 2019-04-22

## 2019-04-22 DIAGNOSIS — R10.13 EPIGASTRIC PAIN: Primary | ICD-10-CM

## 2019-04-22 NOTE — TELEPHONE ENCOUNTER
Patient states that medications for GERD are not helping to relieve symptoms. She is requesting a referral to [facility] in [place].

## 2019-04-24 ENCOUNTER — HOSPITAL ENCOUNTER (EMERGENCY)
Age: 54
Discharge: HOME | End: 2019-04-24
Attending: EMERGENCY MEDICINE
Payer: COMMERCIAL

## 2019-04-24 ENCOUNTER — HOSPITAL ENCOUNTER (EMERGENCY)
Age: 54
Discharge: HOME | End: 2019-04-24
Payer: COMMERCIAL

## 2019-04-24 ENCOUNTER — APPOINTMENT (OUTPATIENT)
Dept: CT IMAGING | Age: 54
End: 2019-04-24
Payer: COMMERCIAL

## 2019-04-24 VITALS
RESPIRATION RATE: 18 BRPM | TEMPERATURE: 97.9 F | BODY MASS INDEX: 27.44 KG/M2 | OXYGEN SATURATION: 97 % | HEART RATE: 75 BPM | WEIGHT: 170 LBS | DIASTOLIC BLOOD PRESSURE: 63 MMHG | SYSTOLIC BLOOD PRESSURE: 120 MMHG

## 2019-04-24 VITALS
BODY MASS INDEX: 28.19 KG/M2 | RESPIRATION RATE: 20 BRPM | HEIGHT: 66 IN | WEIGHT: 175.38 LBS | OXYGEN SATURATION: 98 % | HEART RATE: 69 BPM | SYSTOLIC BLOOD PRESSURE: 135 MMHG | TEMPERATURE: 97.9 F | DIASTOLIC BLOOD PRESSURE: 88 MMHG

## 2019-04-24 DIAGNOSIS — K20.90 ESOPHAGITIS: Primary | ICD-10-CM

## 2019-04-24 DIAGNOSIS — K21.9 GASTROESOPHAGEAL REFLUX DISEASE, ESOPHAGITIS PRESENCE NOT SPECIFIED: ICD-10-CM

## 2019-04-24 DIAGNOSIS — K21.9 GASTROESOPHAGEAL REFLUX DISEASE WITHOUT ESOPHAGITIS: ICD-10-CM

## 2019-04-24 DIAGNOSIS — R07.9 CHEST PAIN, UNSPECIFIED TYPE: ICD-10-CM

## 2019-04-24 DIAGNOSIS — N39.0 URINARY TRACT INFECTION WITHOUT HEMATURIA, SITE UNSPECIFIED: Primary | ICD-10-CM

## 2019-04-24 LAB
BACTERIA: ABNORMAL /HPF
BILIRUBIN URINE: NEGATIVE
BLOOD, URINE: NEGATIVE
CLARITY: CLEAR
CO2: 29 MMOL/L (ref 22–29)
COLOR: YELLOW
EPITHELIAL CELLS, UA: ABNORMAL /HPF
GFR AFRICAN AMERICAN: >60
GFR NON-AFRICAN AMERICAN: >60 ML/MIN/1.73
GLUCOSE BLD-MCNC: 81 MG/DL (ref 74–99)
GLUCOSE URINE: NEGATIVE MG/DL
KETONES, URINE: NEGATIVE MG/DL
LEUKOCYTE ESTERASE, URINE: ABNORMAL
NITRITE, URINE: NEGATIVE
PH UA: 6 (ref 5–9)
POC ANION GAP: 10 MMOL/L (ref 7–16)
POC BUN: 19 MG/DL (ref 8–23)
POC CHLORIDE: 105 MMOL/L (ref 100–108)
POC CREATININE: 0.8 MG/DL (ref 0.5–1)
POC POTASSIUM: 3.7 MMOL/L (ref 3.5–5)
POC SODIUM: 144 MMOL/L (ref 132–146)
PROTEIN UA: NEGATIVE MG/DL
RBC UA: ABNORMAL /HPF (ref 0–2)
SPECIFIC GRAVITY UA: 1.02 (ref 1–1.03)
UROBILINOGEN, URINE: 0.2 E.U./DL
WBC UA: ABNORMAL /HPF (ref 0–5)

## 2019-04-24 PROCEDURE — 87088 URINE BACTERIA CULTURE: CPT

## 2019-04-24 PROCEDURE — 82565 ASSAY OF CREATININE: CPT

## 2019-04-24 PROCEDURE — 82947 ASSAY GLUCOSE BLOOD QUANT: CPT

## 2019-04-24 PROCEDURE — 99284 EMERGENCY DEPT VISIT MOD MDM: CPT

## 2019-04-24 PROCEDURE — 80051 ELECTROLYTE PANEL: CPT

## 2019-04-24 PROCEDURE — 70491 CT SOFT TISSUE NECK W/DYE: CPT

## 2019-04-24 PROCEDURE — 6360000004 HC RX CONTRAST MEDICATION: Performed by: RADIOLOGY

## 2019-04-24 PROCEDURE — 81001 URINALYSIS AUTO W/SCOPE: CPT

## 2019-04-24 PROCEDURE — 99212 OFFICE O/P EST SF 10 MIN: CPT

## 2019-04-24 PROCEDURE — 84520 ASSAY OF UREA NITROGEN: CPT

## 2019-04-24 RX ORDER — RANITIDINE 150 MG/1
150 TABLET ORAL 2 TIMES DAILY
Qty: 60 TABLET | Refills: 1 | Status: SHIPPED | OUTPATIENT
Start: 2019-04-24 | End: 2020-02-13 | Stop reason: ALTCHOICE

## 2019-04-24 RX ORDER — METOCLOPRAMIDE 10 MG/1
10 TABLET ORAL
Qty: 30 TABLET | Refills: 0 | Status: SHIPPED | OUTPATIENT
Start: 2019-04-24 | End: 2019-05-10 | Stop reason: ALTCHOICE

## 2019-04-24 RX ADMIN — IOPAMIDOL 80 ML: 755 INJECTION, SOLUTION INTRAVENOUS at 17:56

## 2019-04-24 ASSESSMENT — PAIN DESCRIPTION - PAIN TYPE: TYPE: ACUTE PAIN

## 2019-04-24 ASSESSMENT — PAIN SCALES - GENERAL
PAINLEVEL_OUTOF10: 4
PAINLEVEL_OUTOF10: 5

## 2019-04-24 ASSESSMENT — PAIN DESCRIPTION - FREQUENCY: FREQUENCY: CONTINUOUS

## 2019-04-24 ASSESSMENT — PAIN DESCRIPTION - ORIENTATION: ORIENTATION: LOWER;LEFT

## 2019-04-24 ASSESSMENT — PAIN DESCRIPTION - LOCATION
LOCATION: THROAT
LOCATION: ABDOMEN;FLANK;BACK

## 2019-04-24 ASSESSMENT — PAIN DESCRIPTION - DESCRIPTORS
DESCRIPTORS: ACHING;DISCOMFORT
DESCRIPTORS: BURNING;SORE;TIGHTNESS

## 2019-04-24 ASSESSMENT — PAIN DESCRIPTION - PROGRESSION: CLINICAL_PROGRESSION: GRADUALLY WORSENING

## 2019-04-24 NOTE — ED NOTES
Bed: 10  Expected date:   Expected time:   Means of arrival:   Comments:  Joel Nolen RN  04/24/19 2403

## 2019-04-24 NOTE — ED PROVIDER NOTES
Department of Emergency Medicine  [address] [place] [address]  Provider Note  Admit Date/Time: 4/24/2019  1:18 PM  Room: 02/02  MRN: [id number]  Chief Complaint: Urinary Tract Infection (c/o lower back and abdominal pressure and left flank pain - pt started taking Macrodantin x 2 doses last night and today) and Gastroesophageal Reflux (pt wants to know if flank pain is related to Rosemary Tash because her Rosemary Tash is much worse - pt is being treated for Rosemary Tash (Zantac and Carafate) and has a referral to  GI doctor)       History of Present Illness   Source of history provided by:  patient. History/Exam Limitations: none. Michael Chew is a 47 y.o. female who has a past medical history of:   Past Medical History:   Diagnosis Date    Anxiety     Asthma     Depression     GERD (gastroesophageal reflux disease)     presents to the urgent care center by private car for multiple complaints. 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 She had Macrobid at home she did start taking them has taken 3 doses. She said she still having some burning with urination she came in to have her urine checked here also. She also reports that for months she's had problems with GERD and she's on different medications she said over the past few weeks it's gotten much worse. She has seen her doctor for that she's been on Protonix which didn't help at the current time she is on Carafate and Zantac. She states she also had an EKG done by her PCP and that was normal.  She's having a feeling that her esophagus \"is swollen and that food is getting stuck and also that she has pain under her left breast. She has a referral to  GI but her appointment has not actually been set up she just has a referral so far.     ROS    Pertinent positives and negatives are stated within HPI, all other systems reviewed and are negative. Past Surgical History:   Procedure Laterality Date    ANKLE SURGERY Left     APPENDECTOMY  1986    GALLBLADDER SURGERY  1993    HYSTERECTOMY      SHOULDER SURGERY Right    Social History:  reports that she has never smoked. She has never used smokeless tobacco. She reports that she drinks alcohol. She reports that she does not use drugs. Family History: family history includes COPD in her brother; Cancer (age of onset: 47) in her brother; Cancer (age of onset: 66) in her father; Hypertension in her father; No Known Problems in her mother; Other in her father. Allergies: Sulfa antibiotics; Vancomycin; and Penicillins    Physical Exam   Oxygen Saturation Interpretation: Normal.   ED Triage Vitals [04/24/19 1324]   BP Temp Temp Source Pulse Resp SpO2 Height Weight   120/63 97.9 °F (36.6 °C) Oral 75 18 97 % -- 170 lb (77.1 kg)       Physical Exam  · Constitutional/General: Alert and oriented x3, well appearing, tearful  · HEENT:  NC/NT. PERRLA,  Airway patent. · Neck: Supple, full ROM, non tender to palpation in the midline, no stridor, no crepitus, no meningeal signs  · Respiratory: Lungs clear to auscultation bilaterally, no wheezes, rales, or rhonchi. Not in respiratory distress  · CV:  Regular rate. Regular rhythm. · Chest: No chest wall tenderness  · GI:  Abdomen Soft, Non tender, Non distended. +BS. No rebound, guarding, or rigidity. No pulsatile masses. · Musculoskeletal: Moves all extremities x 4. Warm and well perfused, no clubbing, cyanosis, or edema. Capillary refill <3 seconds  · Integument: skin warm and dry. No rashes.    · Lymphatic: no lymphadenopathy noted  · Neurologic: GCS 15, no focal deficits, symmetric strength 5/5 in the upper and lower extremities bilaterally  · Psychiatric: Normal Affect    Lab / Imaging Results   (All laboratory and radiology results have been personally reviewed by myself)  Labs:  Results for orders placed or performed during the hospital encounter of 04/24/19   Urinalysis   Result Value Ref Range    Color, UA Yellow Straw/Yellow    Clarity, UA Clear Clear    Glucose, Ur Negative Negative mg/dL    Bilirubin Urine Negative Negative    Ketones, Urine Negative Negative mg/dL    Specific Gravity, UA 1.020 1.005 - 1.030    Blood, Urine Negative Negative    pH, UA 6.0 5.0 - 9.0    Protein, UA Negative Negative mg/dL    Urobilinogen, Urine 0.2 <2.0 E.U./dL    Nitrite, Urine Negative Negative    Leukocyte Esterase, Urine SMALL (A) Negative   Microscopic Urinalysis   Result Value Ref Range    WBC, UA 5-10 0 - 5 /HPF    RBC, UA 2-5 0 - 2 /HPF    Epi Cells FEW /HPF    Bacteria, UA RARE (A) /HPF     Imaging: All Radiology results interpreted by Radiologist unless otherwise noted. No orders to display       ED Course / Medical Decision Making   Medications - No data to display       Consult(s):   None      MDM:  Patient's urine is a positive straight she does have a small amount of leukocyte esterase she has white cells and just some rare bacteria there are  no signs of blood in her urine. She tells me that she has a sensation that her esophagus is swollen and that food is getting stuck. She is already on Carafate and Zantac has been on protonix in the past also which did not help this problem. This has been a chronic problem that is  ongoing. She has had a referral to GI has not seen them yet. Did tell her that there is not any testing here that I could do to see up a problem with her esophagus. I did tell her that she needs to see GI most likely they would do upper endoscopy to see what is going on with her esophagus. She also states she has pain under her left breast I did palpate the area it's not reproducible there are no rashes there. I did tell her with left chest pain that she needs to go to the emergency department for further evaluation.   I did tell her that that the esophagus problem and a chest problem have nothing to do with her urinary tract

## 2019-04-24 NOTE — ED PROVIDER NOTES
HPI:  4/24/19,   Time: 3:42 PM         Kira Duncan is a 47 y.o. female presenting to the ED for esophageal foreign body sensation, beginning over the last week worse over the last 3 days ago. The complaint has been persistent, moderate in severity, and worsened by swallowing. She denies any difficulty swallowing, just this sensation. No fever, drooling, neck pain. ROS:   Pertinent positives and negatives are stated within HPI, all other systems reviewed and are negative.  --------------------------------------------- PAST HISTORY ---------------------------------------------  Past Medical History:  has a past medical history of Anxiety, Asthma, Depression, and GERD (gastroesophageal reflux disease). Past Surgical History:  has a past surgical history that includes Appendectomy (1986); Gallbladder surgery (1993); Hysterectomy; shoulder surgery (Right); Ankle surgery (Left); and Cholecystectomy. Social History:  reports that she has never smoked. She has never used smokeless tobacco. She reports that she drank alcohol. She reports that she does not use drugs. Family History: family history includes COPD in her brother; Cancer (age of onset: 47) in her brother; Cancer (age of onset: 66) in her father; Hypertension in her father; No Known Problems in her mother; Other in her father. The patients home medications have been reviewed.     Allergies: Sulfa antibiotics; Vancomycin; and Penicillins    -------------------------------------------------- RESULTS -------------------------------------------------  All laboratory and radiology results have been personally reviewed by myself   LABS:  Results for orders placed or performed during the hospital encounter of 04/24/19   POCT Venous   Result Value Ref Range    POC Sodium 144 132 - 146 mmol/L    POC Potassium 3.7 3.5 - 5.0 mmol/L    POC Chloride 105 100 - 108 mmol/L    CO2 29 22 - 29 mmol/L    POC Anion Gap 10 7 - 16 mmol/L    POC Glucose 81 74 - 99 mg/dl    POC BUN 19 8 - 23 mg/dL    POC Creatinine 0.8 0.5 - 1.0 mg/dL    GFR Non-African American >60 >=60 mL/min/1.73    GFR  >60        RADIOLOGY:  Interpreted by Radiologist.  CT Soft Tissue Neck W Contrast   Final Result   1. No acute findings. 2. Sclerotic focus in the C4 vertebral body, possibly an osteoblastic   lesion. Follow-up nonemergent MRI should be obtained for further   assessment.                ------------------------- NURSING NOTES AND VITALS REVIEWED ---------------------------   The nursing notes within the ED encounter and vital signs as below have been reviewed. /88   Pulse 69   Temp 97.9 °F (36.6 °C) (Oral)   Resp 20   Ht 5' 6\" (1.676 m)   Wt 175 lb 6 oz (79.5 kg)   SpO2 98%   BMI 28.31 kg/m²   Oxygen Saturation Interpretation: Normal      ---------------------------------------------------PHYSICAL EXAM--------------------------------------      Constitutional/General: Alert and oriented x3, well appearing, non toxic in NAD  Head: NC/AT  Eyes: PERRL, EOMI  Mouth: Oropharynx clear, handling secretions, no trismus  Neck: Supple, full ROM, no meningeal signs  Pulmonary: Lungs clear to auscultation bilaterally, no wheezes, rales, or rhonchi. Not in respiratory distress  Cardiovascular:  Regular rate and rhythm, no murmurs, gallops, or rubs. 2+ distal pulses  Abdomen: Soft, non tender, non distended,   Extremities: Moves all extremities x 4. Warm and well perfused  Skin: warm and dry without rash  Neurologic: GCS 15,  Psych: Normal Affect      ------------------------------ ED COURSE/MEDICAL DECISION MAKING----------------------  Medications   iopamidol (ISOVUE-370) 76 % injection 80 mL (80 mLs Intravenous Given 4/24/19 3787)         Medical Decision Making: Will check CT soft tissues of neck to assess for FB----none noted  Will DC home on Zantac and reglan for now with PMD follow up as well as GI    Counseling:    The emergency provider has spoken with the

## 2019-04-24 NOTE — ED NOTES
Name: Charley Ohara  : 1965  MRN: [id number]    Date: 2019    Benefits of immediately proceeding with Radiology exam outweigh the risks and therefore the following is being waived:      [] Pregnancy test    [] Protocol for Iodine allergy    [] MRI questionnaire    [x] BUN/Creatinine        MD Taiwo Huntley MD  [phone]

## 2019-04-27 LAB — URINE CULTURE, ROUTINE: NORMAL

## 2019-05-03 ENCOUNTER — TELEPHONE (OUTPATIENT)
Dept: FAMILY MEDICINE CLINIC | Age: 54
End: 2019-05-03

## 2019-05-10 ENCOUNTER — HOSPITAL ENCOUNTER (EMERGENCY)
Age: 54
Discharge: HOME | End: 2019-05-10
Payer: COMMERCIAL

## 2019-05-10 VITALS
SYSTOLIC BLOOD PRESSURE: 124 MMHG | HEIGHT: 66 IN | HEART RATE: 79 BPM | WEIGHT: 170 LBS | BODY MASS INDEX: 27.32 KG/M2 | DIASTOLIC BLOOD PRESSURE: 78 MMHG | OXYGEN SATURATION: 99 % | TEMPERATURE: 97.9 F | RESPIRATION RATE: 18 BRPM

## 2019-05-10 DIAGNOSIS — N39.0 URINARY TRACT INFECTION WITHOUT HEMATURIA, SITE UNSPECIFIED: Primary | ICD-10-CM

## 2019-05-10 LAB
BACTERIA: ABNORMAL /HPF
BILIRUBIN URINE: NEGATIVE
BLOOD, URINE: ABNORMAL
CLARITY: ABNORMAL
COLOR: YELLOW
EPITHELIAL CELLS, UA: ABNORMAL /HPF
GLUCOSE URINE: NEGATIVE MG/DL
KETONES, URINE: NEGATIVE MG/DL
LEUKOCYTE ESTERASE, URINE: ABNORMAL
NITRITE, URINE: NEGATIVE
PH UA: 7 (ref 5–9)
PROTEIN UA: NEGATIVE MG/DL
RBC UA: ABNORMAL /HPF (ref 0–2)
SPECIFIC GRAVITY UA: 1.01 (ref 1–1.03)
UROBILINOGEN, URINE: 0.2 E.U./DL
WBC UA: >20 /HPF (ref 0–5)

## 2019-05-10 PROCEDURE — 81001 URINALYSIS AUTO W/SCOPE: CPT

## 2019-05-10 PROCEDURE — 99212 OFFICE O/P EST SF 10 MIN: CPT

## 2019-05-10 PROCEDURE — 87088 URINE BACTERIA CULTURE: CPT

## 2019-05-10 ASSESSMENT — PAIN DESCRIPTION - PROGRESSION: CLINICAL_PROGRESSION: GRADUALLY WORSENING

## 2019-05-10 ASSESSMENT — PAIN DESCRIPTION - ORIENTATION: ORIENTATION: LOWER

## 2019-05-10 ASSESSMENT — PAIN DESCRIPTION - FREQUENCY: FREQUENCY: CONTINUOUS

## 2019-05-10 ASSESSMENT — PAIN DESCRIPTION - LOCATION: LOCATION: BACK;ABDOMEN;PERINEUM

## 2019-05-10 ASSESSMENT — PAIN DESCRIPTION - ONSET: ONSET: GRADUAL

## 2019-05-10 ASSESSMENT — PAIN SCALES - GENERAL: PAINLEVEL_OUTOF10: 6

## 2019-05-10 ASSESSMENT — PAIN DESCRIPTION - PAIN TYPE: TYPE: ACUTE PAIN

## 2019-05-10 ASSESSMENT — PAIN DESCRIPTION - DESCRIPTORS: DESCRIPTORS: PRESSURE;TENDER

## 2019-05-10 NOTE — ED PROVIDER NOTES
Department of Emergency Medicine  [facility]  Provider Note  Admit Date/Time: 5/10/2019  8:11 AM  Room: 02/02  MRN: [id number]  Chief Complaint: Back Pain (Having low back pain.); Abdominal Pain (Having lower abdominal pressure.); and Urinary Frequency       History of Present Illness   Source of history provided by:  patient. History/Exam Limitations: none. Geoffrey Shay is a 47 y.o. female who has a past medical history of:   Past Medical History:   Diagnosis Date    Anxiety     Asthma     Depression     GERD (gastroesophageal reflux disease)     presents to the urgent care center by private car for some several urinary tract infection. She has pressure over her bladder she has pain over her kidneys she does not have a fever or chills is not vomiting. Has some burning with urination. States she has a history of getting urinary tract infection  ROS    Pertinent positives and negatives are stated within HPI, all other systems reviewed and are negative. Past Surgical History:   Procedure Laterality Date    ANKLE SURGERY Left     APPENDECTOMY  1986    CHOLECYSTECTOMY      GALLBLADDER SURGERY  1993    HYSTERECTOMY      SHOULDER SURGERY Right    Social History:  reports that she has never smoked. She has never used smokeless tobacco. She reports that she drank alcohol. She reports that she does not use drugs. Family History: family history includes COPD in her brother; Cancer (age of onset: 47) in her brother; Cancer (age of onset: 66) in her father; Hypertension in her father; No Known Problems in her mother; Other in her father.   Allergies: Sulfa antibiotics; Vancomycin; and Penicillins    Physical Exam   Oxygen Saturation Interpretation: Normal.   ED Triage Vitals [05/10/19 0813]   BP Temp Temp Source Pulse Resp SpO2 Height Weight   124/78 97.9 °F (36.6 °C) Oral 79 18 99 % 5' 6\" (1.676 m) 170 lb (77.1 kg)       Physical Exam  · Constitutional/General: Alert and oriented x3, well appearing, non toxic in NAD  · HEENT:  NC/NT. conjunctiva clear  · Neck: Supple, full ROM,   · Respiratory: Not in respiratory distress  · CV:  Regular rate. GI:  Abdomen Soft, bilateral CVA tenderness  · Musculoskeletal: Moves all extremities x 4. Warm and well perfused, no clubbing, cyanosis, or edema. Capillary refill <3 seconds  · Integument: skin warm and dry. No rashes. · Lymphatic: no lymphadenopathy noted  · Neurologic: GCS 15, no focal deficits,  · Psychiatric: Normal Affect    Lab / Imaging Results   (All laboratory and radiology results have been personally reviewed by myself)  Labs:  Results for orders placed or performed during the hospital encounter of 05/10/19   Urinalysis   Result Value Ref Range    Color, UA Yellow Straw/Yellow    Clarity, UA SL CLOUDY Clear    Glucose, Ur Negative Negative mg/dL    Bilirubin Urine Negative Negative    Ketones, Urine Negative Negative mg/dL    Specific Gravity, UA 1.010 1.005 - 1.030    Blood, Urine TRACE (A) Negative    pH, UA 7.0 5.0 - 9.0    Protein, UA Negative Negative mg/dL    Urobilinogen, Urine 0.2 <2.0 E.U./dL    Nitrite, Urine Negative Negative    Leukocyte Esterase, Urine MODERATE (A) Negative   Microscopic Urinalysis   Result Value Ref Range    WBC, UA >20 0 - 5 /HPF    RBC, UA 0-1 0 - 2 /HPF    Epi Cells FEW /HPF    Bacteria, UA MANY (A) /HPF     Imaging: All Radiology results interpreted by Radiologist unless otherwise noted. No orders to display       ED Course / Medical Decision Making   Medications - No data to display       Consult(s):   None    Procedure(s):   none    MDM:   Patient here with urinary tract infection symptoms she gets them frequently she is make an appointment with urology she wants her urine tested she's having some back discomfort pressure over her bladder and urinary frequency and burning. I did do a urine and it was positive.   He has Macrobid at home she said she didn't started because she

## 2019-05-12 LAB — URINE CULTURE, ROUTINE: NORMAL

## 2019-05-14 ENCOUNTER — CARE COORDINATION (OUTPATIENT)
Dept: CARE COORDINATION | Age: 54
End: 2019-05-14

## 2019-05-14 NOTE — CARE COORDINATION
Ambulatory Care Coordination ED Follow up Call       Reason for ED Visit:  Back pain, abdominal pain, urinary frequency  Care Management Risk Score: CMRS 9    Left voice message for patient regarding ED follow up call. Requested patient please return call;  CCSS contact information provided.

## 2019-05-15 ENCOUNTER — NURSE TRIAGE (OUTPATIENT)
Dept: OTHER | Facility: CLINIC | Age: 54
End: 2019-05-15

## 2019-05-15 ENCOUNTER — APPOINTMENT (OUTPATIENT)
Dept: CT IMAGING | Age: 54
End: 2019-05-15
Payer: COMMERCIAL

## 2019-05-15 ENCOUNTER — HOSPITAL ENCOUNTER (EMERGENCY)
Age: 54
Discharge: HOME | End: 2019-05-15
Attending: EMERGENCY MEDICINE
Payer: COMMERCIAL

## 2019-05-15 VITALS
WEIGHT: 170 LBS | SYSTOLIC BLOOD PRESSURE: 134 MMHG | OXYGEN SATURATION: 98 % | DIASTOLIC BLOOD PRESSURE: 76 MMHG | HEIGHT: 66 IN | BODY MASS INDEX: 27.32 KG/M2 | HEART RATE: 65 BPM | RESPIRATION RATE: 15 BRPM | TEMPERATURE: 98.9 F

## 2019-05-15 DIAGNOSIS — R31.29 MICROSCOPIC HEMATURIA: ICD-10-CM

## 2019-05-15 DIAGNOSIS — R10.9 LEFT FLANK PAIN: Primary | ICD-10-CM

## 2019-05-15 LAB
ALBUMIN SERPL-MCNC: 3.9 G/DL (ref 3.5–5.2)
ALP BLD-CCNC: 67 U/L (ref 35–104)
ALT SERPL-CCNC: 17 U/L (ref 0–32)
ANION GAP SERPL CALCULATED.3IONS-SCNC: 11 MMOL/L (ref 7–16)
AST SERPL-CCNC: 16 U/L (ref 0–31)
BACTERIA: ABNORMAL /HPF
BASOPHILS ABSOLUTE: 0.02 E9/L (ref 0–0.2)
BASOPHILS RELATIVE PERCENT: 0.4 % (ref 0–2)
BILIRUB SERPL-MCNC: <0.2 MG/DL (ref 0–1.2)
BILIRUBIN URINE: NEGATIVE
BLOOD, URINE: NEGATIVE
BUN BLDV-MCNC: 21 MG/DL (ref 6–20)
CALCIUM SERPL-MCNC: 8.9 MG/DL (ref 8.6–10.2)
CHLORIDE BLD-SCNC: 105 MMOL/L (ref 98–107)
CLARITY: CLEAR
CO2: 26 MMOL/L (ref 22–29)
COLOR: YELLOW
CREAT SERPL-MCNC: 1 MG/DL (ref 0.5–1)
EOSINOPHILS ABSOLUTE: 0.17 E9/L (ref 0.05–0.5)
EOSINOPHILS RELATIVE PERCENT: 3.5 % (ref 0–6)
GFR AFRICAN AMERICAN: >60
GFR NON-AFRICAN AMERICAN: 58 ML/MIN/1.73
GLUCOSE BLD-MCNC: 101 MG/DL (ref 74–99)
GLUCOSE URINE: NEGATIVE MG/DL
HCT VFR BLD CALC: 40.2 % (ref 34–48)
HEMOGLOBIN: 13.4 G/DL (ref 11.5–15.5)
IMMATURE GRANULOCYTES #: 0.01 E9/L
IMMATURE GRANULOCYTES %: 0.2 % (ref 0–5)
KETONES, URINE: NEGATIVE MG/DL
LACTIC ACID: 2.2 MMOL/L (ref 0.5–2.2)
LEUKOCYTE ESTERASE, URINE: ABNORMAL
LYMPHOCYTES ABSOLUTE: 1.93 E9/L (ref 1.5–4)
LYMPHOCYTES RELATIVE PERCENT: 39.7 % (ref 20–42)
MCH RBC QN AUTO: 30 PG (ref 26–35)
MCHC RBC AUTO-ENTMCNC: 33.3 % (ref 32–34.5)
MCV RBC AUTO: 90.1 FL (ref 80–99.9)
MONOCYTES ABSOLUTE: 0.4 E9/L (ref 0.1–0.95)
MONOCYTES RELATIVE PERCENT: 8.2 % (ref 2–12)
NEUTROPHILS ABSOLUTE: 2.33 E9/L (ref 1.8–7.3)
NEUTROPHILS RELATIVE PERCENT: 48 % (ref 43–80)
NITRITE, URINE: NEGATIVE
PDW BLD-RTO: 12.1 FL (ref 11.5–15)
PH UA: 5.5 (ref 5–9)
PLATELET # BLD: 198 E9/L (ref 130–450)
PMV BLD AUTO: 10.9 FL (ref 7–12)
POTASSIUM REFLEX MAGNESIUM: 3.8 MMOL/L (ref 3.5–5)
PROTEIN UA: NEGATIVE MG/DL
RBC # BLD: 4.46 E12/L (ref 3.5–5.5)
RBC UA: ABNORMAL /HPF (ref 0–2)
SODIUM BLD-SCNC: 142 MMOL/L (ref 132–146)
SPECIFIC GRAVITY UA: 1.02 (ref 1–1.03)
TOTAL PROTEIN: 6.1 G/DL (ref 6.4–8.3)
UROBILINOGEN, URINE: 0.2 E.U./DL
WBC # BLD: 4.9 E9/L (ref 4.5–11.5)
WBC UA: ABNORMAL /HPF (ref 0–5)

## 2019-05-15 PROCEDURE — 85025 COMPLETE CBC W/AUTO DIFF WBC: CPT

## 2019-05-15 PROCEDURE — 74178 CT ABD&PLV WO CNTR FLWD CNTR: CPT

## 2019-05-15 PROCEDURE — 96374 THER/PROPH/DIAG INJ IV PUSH: CPT

## 2019-05-15 PROCEDURE — 96375 TX/PRO/DX INJ NEW DRUG ADDON: CPT

## 2019-05-15 PROCEDURE — 80053 COMPREHEN METABOLIC PANEL: CPT

## 2019-05-15 PROCEDURE — 6360000002 HC RX W HCPCS: Performed by: NURSE PRACTITIONER

## 2019-05-15 PROCEDURE — 81001 URINALYSIS AUTO W/SCOPE: CPT

## 2019-05-15 PROCEDURE — 83605 ASSAY OF LACTIC ACID: CPT

## 2019-05-15 PROCEDURE — 6360000004 HC RX CONTRAST MEDICATION: Performed by: RADIOLOGY

## 2019-05-15 PROCEDURE — 99284 EMERGENCY DEPT VISIT MOD MDM: CPT

## 2019-05-15 PROCEDURE — 2580000003 HC RX 258: Performed by: NURSE PRACTITIONER

## 2019-05-15 PROCEDURE — 36415 COLL VENOUS BLD VENIPUNCTURE: CPT

## 2019-05-15 RX ORDER — DICLOFENAC SODIUM AND MISOPROSTOL 50; 200 MG/1; UG/1
1 TABLET, DELAYED RELEASE ORAL 2 TIMES DAILY
Qty: 60 TABLET | Refills: 0 | Status: SHIPPED | OUTPATIENT
Start: 2019-05-15 | End: 2019-07-25

## 2019-05-15 RX ORDER — ONDANSETRON 2 MG/ML
4 INJECTION INTRAMUSCULAR; INTRAVENOUS ONCE
Status: COMPLETED | OUTPATIENT
Start: 2019-05-15 | End: 2019-05-15

## 2019-05-15 RX ORDER — 0.9 % SODIUM CHLORIDE 0.9 %
1000 INTRAVENOUS SOLUTION INTRAVENOUS ONCE
Status: COMPLETED | OUTPATIENT
Start: 2019-05-15 | End: 2019-05-15

## 2019-05-15 RX ORDER — KETOROLAC TROMETHAMINE 15 MG/ML
15 INJECTION, SOLUTION INTRAMUSCULAR; INTRAVENOUS ONCE
Status: COMPLETED | OUTPATIENT
Start: 2019-05-15 | End: 2019-05-15

## 2019-05-15 RX ADMIN — KETOROLAC TROMETHAMINE 15 MG: 15 INJECTION, SOLUTION INTRAMUSCULAR; INTRAVENOUS at 08:21

## 2019-05-15 RX ADMIN — SODIUM CHLORIDE 1000 ML: 9 INJECTION, SOLUTION INTRAVENOUS at 08:21

## 2019-05-15 RX ADMIN — IOPAMIDOL 80 ML: 755 INJECTION, SOLUTION INTRAVENOUS at 09:52

## 2019-05-15 RX ADMIN — ONDANSETRON 4 MG: 2 INJECTION INTRAMUSCULAR; INTRAVENOUS at 08:21

## 2019-05-15 ASSESSMENT — PAIN DESCRIPTION - PAIN TYPE: TYPE: ACUTE PAIN

## 2019-05-15 ASSESSMENT — PAIN DESCRIPTION - FREQUENCY: FREQUENCY: CONTINUOUS

## 2019-05-15 ASSESSMENT — PAIN SCALES - GENERAL
PAINLEVEL_OUTOF10: 4
PAINLEVEL_OUTOF10: 6
PAINLEVEL_OUTOF10: 3

## 2019-05-15 ASSESSMENT — PAIN DESCRIPTION - DESCRIPTORS: DESCRIPTORS: CONSTANT;RADIATING

## 2019-05-15 ASSESSMENT — PAIN DESCRIPTION - ORIENTATION: ORIENTATION: LEFT

## 2019-05-15 ASSESSMENT — PAIN DESCRIPTION - LOCATION: LOCATION: FLANK;BACK

## 2019-05-15 NOTE — TELEPHONE ENCOUNTER
Has been to urgent care x 2 with the following symptoms over this past month  Flank pain 6/10-left side, progressively worsening  Completed 2 courses of abx -macrodantin but symptoms return after finished  Trying to stay hydrated   Urine still shows large amount of leuk (she is a  and tested her urine)  No frequency, dysuria, blood in urine but bad bladder pain when bladder is full  Concentrated and cloudy urine  No vomiting, has not measured for fever but does not think she has one  S/p colonoscopy recently   Took Advil last night for flank pain at 2130 and has not repeated  She is miserable    Leaves for vacation Friday to [place]    Reason for Disposition   Patient sounds very sick or weak to the triager    Protocols used:  FLANK PAIN-ADULT-AH

## 2019-05-15 NOTE — ED PROVIDER NOTES
Results   (All laboratory and radiology results have been personally reviewed by myself)  Labs:  Results for orders placed or performed during the hospital encounter of 05/15/19   CBC auto differential   Result Value Ref Range    WBC 4.9 4.5 - 11.5 E9/L    RBC 4.46 3.50 - 5.50 E12/L    Hemoglobin 13.4 11.5 - 15.5 g/dL    Hematocrit 40.2 34.0 - 48.0 %    MCV 90.1 80.0 - 99.9 fL    MCH 30.0 26.0 - 35.0 pg    MCHC 33.3 32.0 - 34.5 %    RDW 12.1 11.5 - 15.0 fL    Platelets 683 297 - 206 E9/L    MPV 10.9 7.0 - 12.0 fL    Neutrophils % 48.0 43.0 - 80.0 %    Immature Granulocytes % 0.2 0.0 - 5.0 %    Lymphocytes % 39.7 20.0 - 42.0 %    Monocytes % 8.2 2.0 - 12.0 %    Eosinophils % 3.5 0.0 - 6.0 %    Basophils % 0.4 0.0 - 2.0 %    Neutrophils # 2.33 1.80 - 7.30 E9/L    Immature Granulocytes # 0.01 E9/L    Lymphocytes # 1.93 1.50 - 4.00 E9/L    Monocytes # 0.40 0.10 - 0.95 E9/L    Eosinophils # 0.17 0.05 - 0.50 E9/L    Basophils # 0.02 0.00 - 0.20 E9/L   Comprehensive Metabolic Panel w/ Reflex to MG   Result Value Ref Range    Sodium 142 132 - 146 mmol/L    Potassium reflex Magnesium 3.8 3.5 - 5.0 mmol/L    Chloride 105 98 - 107 mmol/L    CO2 26 22 - 29 mmol/L    Anion Gap 11 7 - 16 mmol/L    Glucose 101 (H) 74 - 99 mg/dL    BUN 21 (H) 6 - 20 mg/dL    CREATININE 1.0 0.5 - 1.0 mg/dL    GFR Non-African American 58 >=60 mL/min/1.73    GFR African American >60     Calcium 8.9 8.6 - 10.2 mg/dL    Total Protein 6.1 (L) 6.4 - 8.3 g/dL    Alb 3.9 3.5 - 5.2 g/dL    Total Bilirubin <0.2 0.0 - 1.2 mg/dL    Alkaline Phosphatase 67 35 - 104 U/L    ALT 17 0 - 32 U/L    AST 16 0 - 31 U/L   Lactic acid, plasma   Result Value Ref Range    Lactic Acid 2.2 0.5 - 2.2 mmol/L   Urinalysis   Result Value Ref Range    Color, UA Yellow Straw/Yellow    Clarity, UA Clear Clear    Glucose, Ur Negative Negative mg/dL    Bilirubin Urine Negative Negative    Ketones, Urine Negative Negative mg/dL    Specific Gravity, UA 1.025 1.005 - 1.030    Blood, Urine Negative Negative    pH, UA 5.5 5.0 - 9.0    Protein, UA Negative Negative mg/dL    Urobilinogen, Urine 0.2 <2.0 E.U./dL    Nitrite, Urine Negative Negative    Leukocyte Esterase, Urine SMALL (A) Negative   Microscopic Urinalysis   Result Value Ref Range    WBC, UA 2-5 0 - 5 /HPF    RBC, UA NONE 0 - 2 /HPF    Bacteria, UA RARE (A) /HPF     Imaging: All Radiology results interpreted by Radiologist unless otherwise noted. CT ABDOMEN PELVIS W WO CONTRAST Additional Contrast? None   Final Result   1. No renal calculus, hydronephrosis, or hydroureter bilaterally. 2. Mildly distended urinary bladder. 3. Few scattered colonic diverticula without evidence of acute   diverticulitis. ED Course / Medical Decision Making     Medications   0.9 % sodium chloride bolus (0 mLs Intravenous Stopped 5/15/19 0955)   ketorolac (TORADOL) injection 15 mg (15 mg Intravenous Given 5/15/19 0821)   ondansetron (ZOFRAN) injection 4 mg (4 mg Intravenous Given 5/15/19 0821)   iopamidol (ISOVUE-370) 76 % injection 80 mL (80 mLs Intravenous Given 5/15/19 0952)        Re-Evaluations:  5/15/19      Time: 11:40 am    .Patient seen and reexamined the bedside. Patients symptoms are improving. Results are explained to patient in plain language, treatment plan reviewed, patient is agreeable with plan for discharge at this point. Understands that will need to call to arrange follow-up. Consultations:             None    Procedures:   none    MDM:  47-year-old female presenting to the ER for evaluation of flank pain and irritative voiding symptoms. She's had symptoms for a week or so with gradually worsening symptoms. She was also treated prior to that for urinary tract infection but the urine culture came back negative. She's not had any constitutional symptoms of illness and she has not had fevers. She is tolerating foods and fluids well. She has no significant pain complaints, specifically in the abdomen, pelvis or flank region. There is no abnormal vaginal bleeding or discharge. She has no concern for sexually transmitted infection exposure. Labs were unremarkable, urinalysis shows trace leukocytes and no other significant findings. Renal function and electrolytes are normal. Lactic acid is 2.2 without any findings to suggest hypoperfusion, sepsis or profound dehydration. Patient is given fluids and symptomatic medications. CT scan was performed without and with contrast for evaluation due to her history of both kidney stones and diverticulitis, no abnormalities were noted to further explain the etiology of the patient's pain. She has had recurrent urinary symptoms and has had blood in her urine at times, we have discussed follow-up as an outpatient with urology for further exam and possible cystoscopy. She advises me that she had previous a been on a low-dose chronic antibiotic daily for recurrent UTIs and has been off of this for one year. She states she still has Macrobid at home and asked if she should continue to take this daily. I have advised her that she should discuss this with her urologist at the follow-up examination. She is advised she may return to ER with new, changing, worsening symptoms or concerns. Counseling:   I have spoken with the spouse and patient and discussed todays results, in addition to providing specific details for the plan of care and counseling regarding the diagnosis and prognosis and are agreeable with the plan. Assessment      1. Left flank pain Stable   2. Microscopic hematuria      This patient's ED course included: a personal history and physicial examination, re-evaluation prior to disposition, multiple bedside re-evaluations, IV medications and complex medical decision making and emergency management  This patient has remained hemodynamically stable, improved and been closely monitored during their ED course. Plan   Discharge to home. Patient condition is good.     New Medications Discharge Medication List as of 5/15/2019 11:42 AM        Electronically signed by FITZ Dye CNP   DD: 5/15/19  **This report was transcribed using voice recognition software. Every effort was made to ensure accuracy; however, inadvertent computerized transcription errors may be present.   END OF PROVIDER NOTE     FITZ Dye CNP  05/15/19 2697

## 2019-07-18 ENCOUNTER — HOSPITAL ENCOUNTER (OUTPATIENT)
Age: 54
Discharge: HOME | End: 2019-07-18
Payer: COMMERCIAL

## 2019-07-18 DIAGNOSIS — G47.33 OSA (OBSTRUCTIVE SLEEP APNEA): ICD-10-CM

## 2019-07-18 DIAGNOSIS — E55.9 VITAMIN D DEFICIENCY: ICD-10-CM

## 2019-07-18 DIAGNOSIS — K21.9 GASTROESOPHAGEAL REFLUX DISEASE, ESOPHAGITIS PRESENCE NOT SPECIFIED: ICD-10-CM

## 2019-07-18 DIAGNOSIS — E78.5 DYSLIPIDEMIA: ICD-10-CM

## 2019-07-18 LAB
ALBUMIN SERPL-MCNC: 4.5 G/DL (ref 3.5–5.2)
ALP BLD-CCNC: 66 U/L (ref 35–104)
ALT SERPL-CCNC: 35 U/L (ref 0–32)
ANION GAP SERPL CALCULATED.3IONS-SCNC: 10 MMOL/L (ref 7–16)
AST SERPL-CCNC: 21 U/L (ref 0–31)
BASOPHILS ABSOLUTE: 0.02 E9/L (ref 0–0.2)
BASOPHILS RELATIVE PERCENT: 0.5 % (ref 0–2)
BILIRUB SERPL-MCNC: 0.3 MG/DL (ref 0–1.2)
BUN BLDV-MCNC: 19 MG/DL (ref 6–20)
CALCIUM SERPL-MCNC: 9.6 MG/DL (ref 8.6–10.2)
CHLORIDE BLD-SCNC: 105 MMOL/L (ref 98–107)
CHOLESTEROL, TOTAL: 198 MG/DL (ref 0–199)
CO2: 29 MMOL/L (ref 22–29)
CREAT SERPL-MCNC: 1 MG/DL (ref 0.5–1)
EOSINOPHILS ABSOLUTE: 0.09 E9/L (ref 0.05–0.5)
EOSINOPHILS RELATIVE PERCENT: 2.5 % (ref 0–6)
GFR AFRICAN AMERICAN: >60
GFR NON-AFRICAN AMERICAN: 58 ML/MIN/1.73
GLUCOSE BLD-MCNC: 101 MG/DL (ref 74–99)
HCT VFR BLD CALC: 40.6 % (ref 34–48)
HDLC SERPL-MCNC: 55 MG/DL
HEMOGLOBIN: 13.9 G/DL (ref 11.5–15.5)
IMMATURE GRANULOCYTES #: 0.01 E9/L
IMMATURE GRANULOCYTES %: 0.3 % (ref 0–5)
LDL CHOLESTEROL CALCULATED: 126 MG/DL (ref 0–99)
LYMPHOCYTES ABSOLUTE: 1.4 E9/L (ref 1.5–4)
LYMPHOCYTES RELATIVE PERCENT: 38.3 % (ref 20–42)
MCH RBC QN AUTO: 30.2 PG (ref 26–35)
MCHC RBC AUTO-ENTMCNC: 34.2 % (ref 32–34.5)
MCV RBC AUTO: 88.3 FL (ref 80–99.9)
MONOCYTES ABSOLUTE: 0.31 E9/L (ref 0.1–0.95)
MONOCYTES RELATIVE PERCENT: 8.5 % (ref 2–12)
NEUTROPHILS ABSOLUTE: 1.83 E9/L (ref 1.8–7.3)
NEUTROPHILS RELATIVE PERCENT: 49.9 % (ref 43–80)
PDW BLD-RTO: 11.9 FL (ref 11.5–15)
PLATELET # BLD: 196 E9/L (ref 130–450)
PMV BLD AUTO: 10.4 FL (ref 7–12)
POTASSIUM SERPL-SCNC: 4.4 MMOL/L (ref 3.5–5)
RBC # BLD: 4.6 E12/L (ref 3.5–5.5)
SODIUM BLD-SCNC: 144 MMOL/L (ref 132–146)
TOTAL PROTEIN: 7.2 G/DL (ref 6.4–8.3)
TRIGL SERPL-MCNC: 86 MG/DL (ref 0–149)
VITAMIN D 25-HYDROXY: 51 NG/ML (ref 30–100)
VLDLC SERPL CALC-MCNC: 17 MG/DL
WBC # BLD: 3.7 E9/L (ref 4.5–11.5)

## 2019-07-18 PROCEDURE — 80053 COMPREHEN METABOLIC PANEL: CPT

## 2019-07-18 PROCEDURE — 82306 VITAMIN D 25 HYDROXY: CPT

## 2019-07-18 PROCEDURE — 80061 LIPID PANEL: CPT

## 2019-07-18 PROCEDURE — 36415 COLL VENOUS BLD VENIPUNCTURE: CPT

## 2019-07-18 PROCEDURE — 85025 COMPLETE CBC W/AUTO DIFF WBC: CPT

## 2019-07-23 ENCOUNTER — HOSPITAL ENCOUNTER (OUTPATIENT)
Age: 54
Discharge: HOME | End: 2019-07-23
Payer: COMMERCIAL

## 2019-07-23 LAB
AMYLASE: 51 U/L (ref 20–100)
LIPASE: 26 U/L (ref 13–60)

## 2019-07-23 PROCEDURE — 86803 HEPATITIS C AB TEST: CPT

## 2019-07-23 PROCEDURE — 83516 IMMUNOASSAY NONANTIBODY: CPT

## 2019-07-23 PROCEDURE — 86706 HEP B SURFACE ANTIBODY: CPT

## 2019-07-23 PROCEDURE — 87340 HEPATITIS B SURFACE AG IA: CPT

## 2019-07-23 PROCEDURE — 36415 COLL VENOUS BLD VENIPUNCTURE: CPT

## 2019-07-23 PROCEDURE — 82150 ASSAY OF AMYLASE: CPT

## 2019-07-23 PROCEDURE — 82784 ASSAY IGA/IGD/IGG/IGM EACH: CPT

## 2019-07-23 PROCEDURE — 83690 ASSAY OF LIPASE: CPT

## 2019-07-23 PROCEDURE — 86704 HEP B CORE ANTIBODY TOTAL: CPT

## 2019-07-24 LAB
HBV SURFACE AB TITR SER: REACTIVE {TITER}
HEPATITIS B SURFACE ANTIGEN INTERPRETATION: NORMAL
HEPATITIS C ANTIBODY INTERPRETATION: NORMAL
IGA: 225 MG/DL (ref 70–400)

## 2019-07-25 ENCOUNTER — OFFICE VISIT (OUTPATIENT)
Dept: FAMILY MEDICINE CLINIC | Age: 54
End: 2019-07-25
Payer: COMMERCIAL

## 2019-07-25 VITALS
WEIGHT: 177 LBS | BODY MASS INDEX: 27.78 KG/M2 | DIASTOLIC BLOOD PRESSURE: 81 MMHG | SYSTOLIC BLOOD PRESSURE: 122 MMHG | HEIGHT: 67 IN | HEART RATE: 78 BPM | OXYGEN SATURATION: 97 %

## 2019-07-25 DIAGNOSIS — J44.9 CHRONIC OBSTRUCTIVE PULMONARY DISEASE, UNSPECIFIED COPD TYPE (HCC): ICD-10-CM

## 2019-07-25 DIAGNOSIS — E55.9 VITAMIN D DEFICIENCY: ICD-10-CM

## 2019-07-25 DIAGNOSIS — R53.83 FATIGUE, UNSPECIFIED TYPE: ICD-10-CM

## 2019-07-25 DIAGNOSIS — E78.5 DYSLIPIDEMIA: Primary | ICD-10-CM

## 2019-07-25 DIAGNOSIS — G47.33 OSA (OBSTRUCTIVE SLEEP APNEA): ICD-10-CM

## 2019-07-25 LAB — HEPATITIS B CORE TOTAL ANTIBODY: NONREACTIVE

## 2019-07-25 PROCEDURE — 99213 OFFICE O/P EST LOW 20 MIN: CPT | Performed by: FAMILY MEDICINE

## 2019-07-25 ASSESSMENT — PATIENT HEALTH QUESTIONNAIRE - PHQ9
1. LITTLE INTEREST OR PLEASURE IN DOING THINGS: 1
SUM OF ALL RESPONSES TO PHQ QUESTIONS 1-9: 2
SUM OF ALL RESPONSES TO PHQ9 QUESTIONS 1 & 2: 2
SUM OF ALL RESPONSES TO PHQ QUESTIONS 1-9: 2
2. FEELING DOWN, DEPRESSED OR HOPELESS: 1

## 2019-07-25 ASSESSMENT — ENCOUNTER SYMPTOMS
SHORTNESS OF BREATH: 0
WHEEZING: 0
BACK PAIN: 1
BLOOD IN STOOL: 0
ABDOMINAL PAIN: 1

## 2019-07-26 LAB
GLIADIN ANTIBODIES IGA: 4 UNITS (ref 0–19)
GLIADIN ANTIBODIES IGG: 13 UNITS (ref 0–19)
TISSUE TRANSGLUTAMINASE ANTIBODY: 1 U/ML (ref 0–5)
TISSUE TRANSGLUTAMINASE IGA: 1 U/ML (ref 0–3)

## 2019-10-03 ENCOUNTER — HOSPITAL ENCOUNTER (OUTPATIENT)
Dept: GENERAL RADIOLOGY | Age: 54
Discharge: HOME | End: 2019-10-05
Payer: COMMERCIAL

## 2019-10-03 DIAGNOSIS — Z12.31 VISIT FOR SCREENING MAMMOGRAM: ICD-10-CM

## 2019-10-03 PROCEDURE — 77063 BREAST TOMOSYNTHESIS BI: CPT

## 2019-10-07 ENCOUNTER — TELEPHONE (OUTPATIENT)
Dept: ONCOLOGY | Age: 54
End: 2019-10-07

## 2019-10-14 ENCOUNTER — HOSPITAL ENCOUNTER (OUTPATIENT)
Dept: GENERAL RADIOLOGY | Age: 54
End: 2019-10-14
Payer: COMMERCIAL

## 2019-10-14 ENCOUNTER — HOSPITAL ENCOUNTER (OUTPATIENT)
Dept: GENERAL RADIOLOGY | Age: 54
Discharge: HOME | End: 2019-10-16
Payer: COMMERCIAL

## 2019-10-14 DIAGNOSIS — R92.8 ABNORMAL MAMMOGRAM: ICD-10-CM

## 2019-10-14 DIAGNOSIS — N64.89 BREAST ASYMMETRY: ICD-10-CM

## 2019-10-14 PROCEDURE — G0279 TOMOSYNTHESIS, MAMMO: HCPCS

## 2020-02-13 ENCOUNTER — TELEPHONE (OUTPATIENT)
Dept: ADMINISTRATIVE | Age: 55
End: 2020-02-13

## 2020-02-13 ENCOUNTER — NURSE TRIAGE (OUTPATIENT)
Dept: OTHER | Facility: CLINIC | Age: 55
End: 2020-02-13

## 2020-02-13 ENCOUNTER — OFFICE VISIT (OUTPATIENT)
Dept: FAMILY MEDICINE CLINIC | Age: 55
End: 2020-02-13
Payer: COMMERCIAL

## 2020-02-13 VITALS
HEIGHT: 67 IN | SYSTOLIC BLOOD PRESSURE: 109 MMHG | HEART RATE: 79 BPM | WEIGHT: 181 LBS | OXYGEN SATURATION: 97 % | BODY MASS INDEX: 28.41 KG/M2 | TEMPERATURE: 97.8 F | DIASTOLIC BLOOD PRESSURE: 71 MMHG

## 2020-02-13 PROCEDURE — G8427 DOCREV CUR MEDS BY ELIG CLIN: HCPCS | Performed by: FAMILY MEDICINE

## 2020-02-13 PROCEDURE — 3017F COLORECTAL CA SCREEN DOC REV: CPT | Performed by: FAMILY MEDICINE

## 2020-02-13 PROCEDURE — G8419 CALC BMI OUT NRM PARAM NOF/U: HCPCS | Performed by: FAMILY MEDICINE

## 2020-02-13 PROCEDURE — 1036F TOBACCO NON-USER: CPT | Performed by: FAMILY MEDICINE

## 2020-02-13 PROCEDURE — 99213 OFFICE O/P EST LOW 20 MIN: CPT | Performed by: FAMILY MEDICINE

## 2020-02-13 PROCEDURE — G8484 FLU IMMUNIZE NO ADMIN: HCPCS | Performed by: FAMILY MEDICINE

## 2020-02-13 RX ORDER — OMEPRAZOLE 40 MG/1
CAPSULE, DELAYED RELEASE ORAL
COMMUNITY
Start: 2020-01-16 | End: 2020-02-13 | Stop reason: ALTCHOICE

## 2020-02-13 RX ORDER — DIAZEPAM 5 MG/1
5 TABLET ORAL NIGHTLY PRN
COMMUNITY

## 2020-02-13 RX ORDER — VITAMIN B COMPLEX
1 CAPSULE ORAL DAILY
COMMUNITY
End: 2020-06-04 | Stop reason: ALTCHOICE

## 2020-02-13 RX ORDER — SUCRALFATE 1 G/1
TABLET ORAL
COMMUNITY
Start: 2020-01-16 | End: 2020-02-13 | Stop reason: ALTCHOICE

## 2020-02-13 RX ORDER — VALACYCLOVIR HYDROCHLORIDE 1 G/1
1000 TABLET, FILM COATED ORAL 3 TIMES DAILY
Qty: 21 TABLET | Refills: 0 | Status: SHIPPED | OUTPATIENT
Start: 2020-02-13 | End: 2020-02-20

## 2020-02-13 ASSESSMENT — PATIENT HEALTH QUESTIONNAIRE - PHQ9
SUM OF ALL RESPONSES TO PHQ9 QUESTIONS 1 & 2: 2
SUM OF ALL RESPONSES TO PHQ9 QUESTIONS 1 & 2: 0
SUM OF ALL RESPONSES TO PHQ QUESTIONS 1-9: 2
2. FEELING DOWN, DEPRESSED OR HOPELESS: 1
SUM OF ALL RESPONSES TO PHQ QUESTIONS 1-9: 2
2. FEELING DOWN, DEPRESSED OR HOPELESS: 0
SUM OF ALL RESPONSES TO PHQ QUESTIONS 1-9: 0
SUM OF ALL RESPONSES TO PHQ QUESTIONS 1-9: 0
1. LITTLE INTEREST OR PLEASURE IN DOING THINGS: 1
1. LITTLE INTEREST OR PLEASURE IN DOING THINGS: 0

## 2020-02-13 ASSESSMENT — ENCOUNTER SYMPTOMS: SHORTNESS OF BREATH: 0

## 2020-02-13 NOTE — PROGRESS NOTES
Fabio Avendaño   Patient is a 49y.o. year old female who presents with:  Chief Complaint   Patient presents with    Rash     Possible shingles on the R leg on the outter part of the knee, symptoms started this week with the feeling of fire burning her legs, noticed the rash this AM      HPI    Rash  First noticed a burning sensation in the skin at the lateral right thigh yesterday. Today saw a rash at the same location. Painful, mildly pruritic  3/10 intensity  Described as red patches per the patient  Painful when clothes brush against it  Admits also to fatigue    Review of Systems   Constitutional: Positive for fatigue. Negative for chills and fever. Respiratory: Negative for shortness of breath. Cardiovascular: Negative for chest pain and leg swelling. Musculoskeletal: Negative for neck stiffness. Skin: Positive for rash. Neurological: Negative for weakness and numbness. Health Maintenance Due   Topic Date Due    DTaP/Tdap/Td vaccine (1 - Tdap) 10/15/1977    Shingles Vaccine (1 of 2) 10/15/2016    Flu vaccine (1) 09/01/2019       Current Outpatient Medications   Medication Sig Dispense Refill    diazepam (VALIUM) 5 MG tablet Take 5 mg by mouth nightly as needed for Anxiety.       PREBIOTIC PRODUCT PO Take by mouth      Probiotic Product (PROBIOTIC DAILY PO) Take by mouth      b complex vitamins capsule Take 1 capsule by mouth daily      Dexchlorphen-PSE-Methscop (CORYZA-D PO) Take by mouth      BLACK ELDERBERRY,BERRY-FLOWER, PO Take by mouth      valACYclovir (VALTREX) 1 g tablet Take 1 tablet by mouth 3 times daily for 7 days 21 tablet 0    vitamin D3 (CHOLECALCIFEROL) 5000 units TABS tablet Take 1 tablet by mouth daily (Patient taking differently: Take 5,000 Units by mouth daily ) 90 tablet 1    traZODone (DESYREL) 100 MG tablet Take 100 mg by mouth nightly       Omega-3 Fatty Acids (FISH OIL) 1200 MG CPDR Take by mouth 2 caps      EPINEPHrine (EPIPEN 2-YURIY) 0.3 over-the-counter pain medicine, such as acetaminophen (Tylenol), ibuprofen (Advil, Motrin), or naproxen (Aleve). Read and follow all instructions on the label. · Avoid close contact with people until the blisters have healed. It is very important for you to avoid contact with anyone who has never had chickenpox or the chickenpox vaccine. Pregnant women, young babies, and anyone else who has a hard time fighting infection (such as someone with HIV, diabetes, or cancer) is especially at risk. When should you call for help? Call your doctor now or seek immediate medical care if:    · You have a new or higher fever.     · You have a severe headache and a stiff neck.     · You lose the ability to think clearly.     · The rash spreads to your forehead, nose, eyes, or eyelids.     · You have eye pain, or your vision gets worse.     · You have new pain in your face, or you cannot move the muscles in your face.     · Blisters spread to new parts of your body.    Watch closely for changes in your health, and be sure to contact your doctor if:    · The rash has not healed after 2 to 4 weeks.     · You still have pain after the rash has healed. Where can you learn more? Go to [email or web address] org and sign in to your [employer] account. Mauricio Robison in the [facility] box to learn more about \"Shingles: Care Instructions. \"     If you do not have an account, please click on the \"Sign Up Now\" link. Current as of: June 9, 2019  Content Version: 12.3  © 9615-2612 Healthwise, Incorporated. Care instructions adapted under license by [facility] ([facility]). If you have questions about a medical condition or this instruction, always ask your healthcare professional. [address] any warranty or liability for your use of this information.

## 2020-02-13 NOTE — PATIENT INSTRUCTIONS
Patient Education        Shingles: Care Instructions  Your Care Instructions    Shingles (herpes zoster) causes pain and a blistered rash. The rash can appear anywhere on the body but will be on only one side of the body, the left or right. It will be in a band, a strip, or a small area. The pain can be very severe. Shingles can also cause tingling or itching in the area of the rash. The blisters scab over after a few days and heal in 2 to 4 weeks. Medicines can help you feel better and may help prevent more serious problems caused by shingles. Shingles is caused by the same virus that causes chickenpox. When you have chickenpox, the virus gets into your nerve roots and stays there (becomes dormant) long after you get over the chickenpox. If the virus becomes active again, it can cause shingles. Follow-up care is a key part of your treatment and safety. Be sure to make and go to all appointments, and call your doctor if you are having problems. It's also a good idea to know your test results and keep a list of the medicines you take. How can you care for yourself at home? · Be safe with medicines. Take your medicines exactly as prescribed. Call your doctor if you think you are having a problem with your medicine. Antiviral medicine helps you get better faster. · Try not to scratch or pick at the blisters. They will crust over and fall off on their own if you leave them alone. · Put cool, wet cloths on the area to relieve pain and itching. You can also use calamine lotion. Try not to use so much lotion that it cakes and is hard to get off. · Put cornstarch or baking soda on the sores to help dry them out so they heal faster. · Do not use thick ointment, such as petroleum jelly, on the sores. This will keep them from drying and healing. · To help remove loose crusts, soak them in tap water. This can help decrease oozing, and dry and soothe the skin.   · Take an over-the-counter pain medicine, such as acetaminophen (Tylenol), ibuprofen (Advil, Motrin), or naproxen (Aleve). Read and follow all instructions on the label. · Avoid close contact with people until the blisters have healed. It is very important for you to avoid contact with anyone who has never had chickenpox or the chickenpox vaccine. Pregnant women, young babies, and anyone else who has a hard time fighting infection (such as someone with HIV, diabetes, or cancer) is especially at risk. When should you call for help? Call your doctor now or seek immediate medical care if:    · You have a new or higher fever.     · You have a severe headache and a stiff neck.     · You lose the ability to think clearly.     · The rash spreads to your forehead, nose, eyes, or eyelids.     · You have eye pain, or your vision gets worse.     · You have new pain in your face, or you cannot move the muscles in your face.     · Blisters spread to new parts of your body.    Watch closely for changes in your health, and be sure to contact your doctor if:    · The rash has not healed after 2 to 4 weeks.     · You still have pain after the rash has healed. Where can you learn more? Go to [email or web address] and sign in to your [employer] account. Ann Hurt in the [facility] box to learn more about \"Shingles: Care Instructions. \"     If you do not have an account, please click on the \"Sign Up Now\" link. Current as of: June 9, 2019  Content Version: 12.3  © 4777-5810 Healthwise, Incorporated. Care instructions adapted under license by [facility] ([facility]). If you have questions about a medical condition or this instruction, always ask your healthcare professional. [address] any warranty or liability for your use of this information.

## 2020-02-13 NOTE — TELEPHONE ENCOUNTER
Call received from [facility]      Reason for Disposition   Shingles rash (matches SYMPTOMS) and onset within past 72 hours    Answer Assessment - Initial Assessment Questions  1. APPEARANCE of RASH: \"Describe the rash. \"       Cluster of lesions on right leg - red, painful and burning  2. LOCATION: \"Where is the rash located? \"       Right thigh, behind knee  3. ONSET: \"When did the rash start? \"       Feeling pain yesterday - lesions appeared this morning  4. ITCHING: \"Does the rash itch? \" If so, ask: \"How bad is the itch? \"  (Scale 1-10; or mild, moderate, severe)      Mild   5. PAIN: \"Does the rash hurt? \" If so, ask: \"How bad is the pain? \"  (Scale 1-10; or mild, moderate, severe)      3/10 = hurts to have clothing touch lestions  6. OTHER SYMPTOMS: \"Do you have any other symptoms? \" (e.g., fever)      none  7. PREGNANCY: \"Is there any chance you are pregnant? \" \"When was your last menstrual period? \"      no    Protocols used: SHINGLES-ADULT-[place]    Caller reports symptoms as documented above. Caller informed of disposition. Soft transfer to pre-service center to schedule apt as recommended. Care advice as documented. Please do not respond to the triage nurse through this encounter. Any subsequent communication should be directly with the patient.

## 2020-03-03 ENCOUNTER — HOSPITAL ENCOUNTER (OUTPATIENT)
Age: 55
Discharge: HOME | End: 2020-03-03
Payer: COMMERCIAL

## 2020-03-03 LAB
ALBUMIN SERPL-MCNC: 4.6 G/DL (ref 3.5–5.2)
ALP BLD-CCNC: 58 U/L (ref 35–104)
ALT SERPL-CCNC: 19 U/L (ref 0–32)
ANION GAP SERPL CALCULATED.3IONS-SCNC: 12 MMOL/L (ref 7–16)
AST SERPL-CCNC: 22 U/L (ref 0–31)
BASOPHILS ABSOLUTE: 0.02 E9/L (ref 0–0.2)
BASOPHILS RELATIVE PERCENT: 0.6 % (ref 0–2)
BILIRUB SERPL-MCNC: 0.3 MG/DL (ref 0–1.2)
BUN BLDV-MCNC: 10 MG/DL (ref 6–20)
CALCIUM SERPL-MCNC: 9.8 MG/DL (ref 8.6–10.2)
CHLORIDE BLD-SCNC: 103 MMOL/L (ref 98–107)
CHOLESTEROL, TOTAL: 222 MG/DL (ref 0–199)
CO2: 28 MMOL/L (ref 22–29)
CREAT SERPL-MCNC: 0.8 MG/DL (ref 0.5–1)
EOSINOPHILS ABSOLUTE: 0.08 E9/L (ref 0.05–0.5)
EOSINOPHILS RELATIVE PERCENT: 2.2 % (ref 0–6)
GFR AFRICAN AMERICAN: >60
GFR NON-AFRICAN AMERICAN: >60 ML/MIN/1.73
GLUCOSE BLD-MCNC: 100 MG/DL (ref 74–99)
HCT VFR BLD CALC: 41.6 % (ref 34–48)
HDLC SERPL-MCNC: 71 MG/DL
HEMOGLOBIN: 14.2 G/DL (ref 11.5–15.5)
IMMATURE GRANULOCYTES #: 0.01 E9/L
IMMATURE GRANULOCYTES %: 0.3 % (ref 0–5)
LDL CHOLESTEROL CALCULATED: 140 MG/DL (ref 0–99)
LYMPHOCYTES ABSOLUTE: 1.58 E9/L (ref 1.5–4)
LYMPHOCYTES RELATIVE PERCENT: 44 % (ref 20–42)
MCH RBC QN AUTO: 30.2 PG (ref 26–35)
MCHC RBC AUTO-ENTMCNC: 34.1 % (ref 32–34.5)
MCV RBC AUTO: 88.5 FL (ref 80–99.9)
MONOCYTES ABSOLUTE: 0.25 E9/L (ref 0.1–0.95)
MONOCYTES RELATIVE PERCENT: 7 % (ref 2–12)
NEUTROPHILS ABSOLUTE: 1.65 E9/L (ref 1.8–7.3)
NEUTROPHILS RELATIVE PERCENT: 45.9 % (ref 43–80)
PDW BLD-RTO: 12.5 FL (ref 11.5–15)
PLATELET # BLD: 174 E9/L (ref 130–450)
PMV BLD AUTO: 11.2 FL (ref 7–12)
POTASSIUM SERPL-SCNC: 4.5 MMOL/L (ref 3.5–5)
RBC # BLD: 4.7 E12/L (ref 3.5–5.5)
SODIUM BLD-SCNC: 143 MMOL/L (ref 132–146)
T4 FREE: 1.15 NG/DL (ref 0.93–1.7)
TOTAL PROTEIN: 7.3 G/DL (ref 6.4–8.3)
TRIGL SERPL-MCNC: 56 MG/DL (ref 0–149)
TSH SERPL DL<=0.05 MIU/L-ACNC: 4.56 UIU/ML (ref 0.27–4.2)
VITAMIN D 25-HYDROXY: 50 NG/ML (ref 30–100)
VLDLC SERPL CALC-MCNC: 11 MG/DL
WBC # BLD: 3.6 E9/L (ref 4.5–11.5)

## 2020-03-03 PROCEDURE — 84443 ASSAY THYROID STIM HORMONE: CPT

## 2020-03-03 PROCEDURE — 84439 ASSAY OF FREE THYROXINE: CPT

## 2020-03-03 PROCEDURE — 82306 VITAMIN D 25 HYDROXY: CPT

## 2020-03-03 PROCEDURE — 85025 COMPLETE CBC W/AUTO DIFF WBC: CPT

## 2020-03-03 PROCEDURE — 80061 LIPID PANEL: CPT

## 2020-03-03 PROCEDURE — 80053 COMPREHEN METABOLIC PANEL: CPT

## 2020-03-03 PROCEDURE — 36415 COLL VENOUS BLD VENIPUNCTURE: CPT

## 2020-04-03 ENCOUNTER — TELEMEDICINE (OUTPATIENT)
Dept: FAMILY MEDICINE CLINIC | Age: 55
End: 2020-04-03
Payer: COMMERCIAL

## 2020-04-03 PROBLEM — J45.20 MILD INTERMITTENT ASTHMA WITHOUT COMPLICATION: Status: ACTIVE | Noted: 2018-01-22

## 2020-04-03 PROCEDURE — 99214 OFFICE O/P EST MOD 30 MIN: CPT | Performed by: FAMILY MEDICINE

## 2020-04-03 PROCEDURE — G8427 DOCREV CUR MEDS BY ELIG CLIN: HCPCS | Performed by: FAMILY MEDICINE

## 2020-04-03 PROCEDURE — 3017F COLORECTAL CA SCREEN DOC REV: CPT | Performed by: FAMILY MEDICINE

## 2020-04-03 ASSESSMENT — ENCOUNTER SYMPTOMS
WHEEZING: 0
SHORTNESS OF BREATH: 0
ABDOMINAL PAIN: 0
BACK PAIN: 1
BLOOD IN STOOL: 0

## 2020-04-03 NOTE — PATIENT INSTRUCTIONS
slowly lower the leg back down and rest a few seconds. 4. Do 8 to 12 repetitions, 3 times a day. Straight-leg raises to the inside   1. Lie on your side with the leg you are going to exercise on the bottom and your other foot up on a chair. 2. Tighten your thigh muscles, and then lift your leg straight up away from the floor. 3. Hold for about 6 seconds, slowly lower the leg back down, and rest a few seconds. 4. Do 8 to 12 repetitions, 3 times a day. Straight-leg raises to the outside   1. Lie on your side with the leg you are going to exercise on top. 2. Tighten your thigh muscles, and then lift your leg straight up away from the floor. 3. Keep your hip and your leg straight in line with the rest of your body, and keep your knee pointing forward. Do not drop your hip back. 4. Hold for about 6 seconds, slowly lower the leg back down, and rest a few seconds. 5. Do 8 to 12 repetitions, 3 times a day. Straight-leg raises to the back   1. Lie on your belly. 2. Tighten your thigh muscles, and then lift your leg straight up away from the floor. 3. Hold for about 6 seconds, slowly lower the leg back down, and rest a few seconds. 4. Do 8 to 12 repetitions, 3 times a day. Shallow standing knee bends   1. Stand with your hands lightly resting on a counter or chair in front of you. Place your feet shoulder-width apart. 2. Slowly bend your knees so that you squat down like you are going to sit in a chair. Make sure your knees do not go in front of your toes. 3. Lower yourself about 6 inches. Your heels should remain on the floor at all times. 4. Rise slowly to a standing position. 5. Do 8 to 12 repetitions, 3 times a day. 6. Note for shallow knee bend on one leg: Remember to limit the bend of your knee to a 30-degree angle at first. When your knee is bent past this point, your kneecap will have more contact with the thighbone, causing more pressure, pain, and possible cartilage damage.     Shallow hip.  4. Hold the stretch for at least 15 to 30 seconds. Repeat 2 to 4 times. Iliotibial band and buttock stretch   1. Sit on the floor with your legs out in front of you. 2. Bend the knee of the leg you want to stretch, and put that foot on the floor on the outside of the opposite leg. (Your legs will be crossed.)  3. Twist your shoulders toward your bent leg, and put your opposite elbow on that knee. 4. Push your arm against your knee to feel a gentle stretch at the back of your buttock and around your hip. 5. Hold the stretch for at least 15 to 30 seconds. Repeat 2 to 4 times. Calf stretch   1. Stand facing a wall with your hands on the wall at about eye level. 2. Put the leg you want to stretch about a step behind your other leg. 3. Keeping your back heel on the floor, bend your front knee until you feel a stretch in the back leg. 4. Hold the stretch for at least 15 to 30 seconds. Repeat 2 to 4 times. Follow-up care is a key part of your treatment and safety. Be sure to make and go to all appointments, and call your doctor if you are having problems. It's also a good idea to know your test results and keep a list of the medicines you take. Where can you learn more? Go to https://[email or web address] [email or web address].[email or web address] [email or web address] org and sign in to your [employer] account. Enter [phone] in the [facility] box to learn more about \"Patellar Tracking Disorder: Exercises. \"     If you do not have an account, please click on the \"Sign Up Now\" link. Current as of: June 26, 2019Content Version: 12.4  © 7754-7768 Healthwise, Incorporated. Care instructions adapted under license by [facility] ([facility]). If you have questions about a medical condition or this instruction, always ask your healthcare professional. [address] any warranty or liability for your use of this information.

## 2020-04-03 NOTE — PROGRESS NOTES
Procedure Laterality Date    ANKLE SURGERY Left     APPENDECTOMY  1986    CHOLECYSTECTOMY      GALLBLADDER SURGERY  1993    HYSTERECTOMY      SHOULDER SURGERY Right        Allergies  Allergies   Allergen Reactions    Sulfa Antibiotics Itching    Vancomycin Itching    Penicillins Rash       Family  Family History   Problem Relation Age of Onset    No Known Problems Mother     Cancer Father 66        Colon    Other Father         AAA    Hypertension Father     Cancer Brother 47        Lung    COPD Brother        Social History     Socioeconomic History    Marital status:      Spouse name: None    Number of children: 2    Years of education: None    Highest education level: None   Occupational History     Employer: [employer]   Social Needs    Financial resource strain: None    Food insecurity     Worry: None     Inability: None    Transportation needs     Medical: None     Non-medical: None   Tobacco Use    Smoking status: Never Smoker    Smokeless tobacco: Never Used   Substance and Sexual Activity    Alcohol use: Not Currently    Drug use: No    Sexual activity: None   Lifestyle    Physical activity     Days per week: None     Minutes per session: None    Stress: None   Relationships    Social connections     Talks on phone: None     Gets together: None     Attends Yazidi service: None     Active member of club or organization: None     Attends meetings of clubs or organizations: None     Relationship status: None    Intimate partner violence     Fear of current or ex partner: None     Emotionally abused: None     Physically abused: None     Forced sexual activity: None   Other Topics Concern    None   Social History Narrative    None         OBJECTIVE    There were no vitals taken for this visit.     Wt Readings from Last 3 Encounters:   02/13/20 181 lb (82.1 kg)   07/25/19 177 lb (80.3 kg)   05/15/19 170 lb (77.1 kg)     Physical Exam   Constitutional: She

## 2020-05-04 ENCOUNTER — PATIENT MESSAGE (OUTPATIENT)
Dept: FAMILY MEDICINE CLINIC | Age: 55
End: 2020-05-04

## 2020-05-06 NOTE — TELEPHONE ENCOUNTER
From: Mariely Lemons  To: Ludivina Yang DO  Sent: 5/4/2020 5:18 PM EDT  Subject: Non-Urgent Medical Question    We had a telemedicine visit on April 3rd. I let you know about left knee pain I had been having for months. You gave me exercises which I am doing but my left is still really bothering me. You had offered a X-ray but I didn't want to go out for it. I am ready now to do it because the pain isn't getting better.  Thank you

## 2020-05-07 NOTE — TELEPHONE ENCOUNTER
Patient called to follow up. I advised Xray order has been placed. Patient stated she'll have this done 5/8/20.

## 2020-05-13 ENCOUNTER — HOSPITAL ENCOUNTER (OUTPATIENT)
Age: 55
Discharge: HOME | End: 2020-05-15
Payer: COMMERCIAL

## 2020-05-13 ENCOUNTER — TELEMEDICINE (OUTPATIENT)
Dept: FAMILY MEDICINE CLINIC | Age: 55
End: 2020-05-13
Payer: COMMERCIAL

## 2020-05-13 LAB
T4 FREE: 1.06 NG/DL (ref 0.93–1.7)
TSH SERPL DL<=0.05 MIU/L-ACNC: 4.9 UIU/ML (ref 0.27–4.2)

## 2020-05-13 PROCEDURE — 84439 ASSAY OF FREE THYROXINE: CPT

## 2020-05-13 PROCEDURE — G8427 DOCREV CUR MEDS BY ELIG CLIN: HCPCS | Performed by: FAMILY MEDICINE

## 2020-05-13 PROCEDURE — 3017F COLORECTAL CA SCREEN DOC REV: CPT | Performed by: FAMILY MEDICINE

## 2020-05-13 PROCEDURE — 99213 OFFICE O/P EST LOW 20 MIN: CPT | Performed by: FAMILY MEDICINE

## 2020-05-13 PROCEDURE — 36415 COLL VENOUS BLD VENIPUNCTURE: CPT

## 2020-05-13 PROCEDURE — 84443 ASSAY THYROID STIM HORMONE: CPT

## 2020-05-13 ASSESSMENT — ENCOUNTER SYMPTOMS: SHORTNESS OF BREATH: 0

## 2020-05-13 NOTE — PROGRESS NOTES
Arlette Singh   Patient is a 49y.o. year old female who presents with:  Chief Complaint   Patient presents with    Follow-up     Increased knee pain     X-ray      Discuss the results of her Knee xray      HPI    TeleMedicine Patient Consent    This visit was performed as a virtual video visit using a synchronous, two-way, audio-video telehealth [facility]. Patient identification was verified at the start of the visit, including the patient's telephone number and physical location. I discussed with the patient the nature of our telehealth visits, that:     1. Due to the nature of an audio- video modality, the only components of a physical exam that could be done are the elements supported by direct observation. 2. I would evaluate the patient and recommend diagnostics and treatments based on my assessment. 3. If it was felt that the patient should be evaluated in clinic or an emergency room setting, then they would be directed there. 4. Our sessions are not being recorded and that personal health information is protected. 5. Our team would provide follow up care in person if/when the patient needs it. Patient does agree to proceed with telemedicine consultation. Patient's location: home address in [facility]    Physician's location: [facility]    Time spent: Greater than 15 minutes        L knee pain  4/3/2020: Onset three months ago  Felt a pull while running on a treadmill  Pain at anterior knee   Some grinding with moving the kneecap   Moderate severity  Tried home exercises  Notes hx of genu varum  5/13/2020:  Began home exercises for suspected PF syndrome. XR was ordered 5/6/2020 after patient reported no improvement. States exercises were helpful initially. XR showed degen at the PF compartment  States she twisted the knee yesterday, now has pain at the medial knee cap. Admits to mild swelling at the knee.    Admits to instability, stiffness - both present OBJECTIVE    There were no vitals taken for this visit. Wt Readings from Last 3 Encounters:   02/13/20 181 lb (82.1 kg)   07/25/19 177 lb (80.3 kg)   05/15/19 170 lb (77.1 kg)       Physical Exam  Constitutional:       General: She is not in acute distress. HENT:      Head: Normocephalic and atraumatic. Pulmonary:      Effort: No respiratory distress. Neurological:      Mental Status: She is alert and oriented to person, place, and time. Psychiatric:         Mood and Affect: Mood normal.         Speech: Speech normal.         Behavior: Behavior normal.         Thought Content: Thought content normal.         Judgment: Judgment normal.       ASSESSMENT AND PLAN    1. Chronic pain of left knee  Persists and with worsening vs new injury. CXR prior to injury shows degen changes in the PF compartment. Refer for ortho consult. [address] [facility]    2. Elevated TSH  On last results, thyroid symptoms worsening. Reassess TFTs and return in ten days for f/u.   - TSH; Future  - T4, Free; Future    Return in about 10 days (around 5/23/2020) for elevated TSH. Karsten Felipe DO  05/13/20  4:58 PM    There are no Patient Instructions on file for this visit.

## 2020-05-22 ENCOUNTER — TELEPHONE (OUTPATIENT)
Dept: ADMINISTRATIVE | Age: 55
End: 2020-05-22

## 2020-05-22 ENCOUNTER — OFFICE VISIT (OUTPATIENT)
Dept: ORTHOPEDIC SURGERY | Age: 55
End: 2020-05-22
Payer: COMMERCIAL

## 2020-05-22 ENCOUNTER — E-VISIT (OUTPATIENT)
Dept: FAMILY MEDICINE CLINIC | Age: 55
End: 2020-05-22

## 2020-05-22 VITALS — WEIGHT: 181 LBS | HEIGHT: 66 IN | BODY MASS INDEX: 29.09 KG/M2

## 2020-05-22 PROCEDURE — G8419 CALC BMI OUT NRM PARAM NOF/U: HCPCS | Performed by: ORTHOPAEDIC SURGERY

## 2020-05-22 PROCEDURE — 1036F TOBACCO NON-USER: CPT | Performed by: ORTHOPAEDIC SURGERY

## 2020-05-22 PROCEDURE — 3017F COLORECTAL CA SCREEN DOC REV: CPT | Performed by: ORTHOPAEDIC SURGERY

## 2020-05-22 PROCEDURE — 99441 PR PHYS/QHP TELEPHONE EVALUATION 5-10 MIN: CPT | Performed by: FAMILY MEDICINE

## 2020-05-22 PROCEDURE — 99203 OFFICE O/P NEW LOW 30 MIN: CPT | Performed by: ORTHOPAEDIC SURGERY

## 2020-05-22 PROCEDURE — G8427 DOCREV CUR MEDS BY ELIG CLIN: HCPCS | Performed by: ORTHOPAEDIC SURGERY

## 2020-05-22 NOTE — PROGRESS NOTES
E-visit    HPI    As per patient's responses to the e-visit questionnaire and as per last visit. At last visit discussed fatigue, hair loss, cold intolerance. Previous TFTs were consistent with subclinical hypothyroidism, repeat again shows the same. Patient has recently begun a supplement which she also mentioned last visit    Assessment and Plan    1. Subclinical hypothyroidism  Recommend beginning treatment for symptomatic subclinical hypothyroidism with levothyroxine. As per our discussion at her previous visit she wanted to try the supplement before beginning the medication. Will relay the results and recommendation to begin LT4 to the patient and if agreeable will send rx. Will communicate plan and any other relevant information to the patient through [employer] message. Jass Madrigal DO  05/22/20  4:02 PM    5-10 minutes were spent on the digital evaluation and management of this patient.

## 2020-05-22 NOTE — PROGRESS NOTES
problems with the patient and evaluation and treatment options. She would like to know what is going on so she can treated definitively if possible. PLAN: We will seek approval for an MRI which will give us more information on bone cartilage and other soft tissues in the knee. There is a suspicion of a medial meniscus tear here. Patient Active Problem List   Diagnosis    Vitamin D deficiency    Dyslipidemia    Mild intermittent asthma without complication    Bee allergy status    Chronic pain syndrome    Lumbar facet arthropathy    Lumbar disc disorder    Lumbar spondylosis    JOSÉ MIGUEL (obstructive sleep apnea)    Esophagitis       Past Medical History:   Diagnosis Date    Anxiety     Asthma     Depression     GERD (gastroesophageal reflux disease)        Past Surgical History:   Procedure Laterality Date    ANKLE SURGERY Left     APPENDECTOMY  1986    CHOLECYSTECTOMY      GALLBLADDER SURGERY  1993    HYSTERECTOMY      SHOULDER SURGERY Right        Current Outpatient Medications   Medication Sig Dispense Refill    EPINEPHrine (EPIPEN 2-YURIY) 0.3 MG/0.3ML SOAJ injection Inject 0.3 mLs into the muscle once for 1 dose Use as directed for allergic reaction 0.3 mL 0    budesonide-formoterol (SYMBICORT) 160-4.5 MCG/ACT AERO Inhale 2 puffs into the lungs 2 times daily 1 Inhaler 0    albuterol sulfate  (90 Base) MCG/ACT inhaler Inhale 2 puffs into the lungs 4 times daily as needed for Wheezing 1 Inhaler 0    diazepam (VALIUM) 5 MG tablet Take 5 mg by mouth nightly as needed for Anxiety.       PREBIOTIC PRODUCT PO Take by mouth      Probiotic Product (PROBIOTIC DAILY PO) Take by mouth      b complex vitamins capsule Take 1 capsule by mouth daily      Dexchlorphen-PSE-Methscop (CORYZA-D PO) Take by mouth      BLACK ELDERBERRY,BERRY-FLOWER, PO Take by mouth      vitamin D3 (CHOLECALCIFEROL) 5000 units TABS tablet Take 1 tablet by mouth daily (Patient taking differently: Take 5,000

## 2020-05-22 NOTE — TELEPHONE ENCOUNTER
Pt states she was on her appt ready for 45 mins and is wondering what happened. She was on the phone with another preservice person when she saw your office call and tried to get back on her [facility] appt and it wouldn't let her. Please give her a call. Thank you.

## 2020-05-27 ENCOUNTER — TELEMEDICINE (OUTPATIENT)
Dept: FAMILY MEDICINE CLINIC | Age: 55
End: 2020-05-27
Payer: COMMERCIAL

## 2020-05-27 PROCEDURE — G8427 DOCREV CUR MEDS BY ELIG CLIN: HCPCS | Performed by: FAMILY MEDICINE

## 2020-05-27 PROCEDURE — 3017F COLORECTAL CA SCREEN DOC REV: CPT | Performed by: FAMILY MEDICINE

## 2020-05-27 PROCEDURE — 99213 OFFICE O/P EST LOW 20 MIN: CPT | Performed by: FAMILY MEDICINE

## 2020-05-27 ASSESSMENT — ENCOUNTER SYMPTOMS
ABDOMINAL PAIN: 1
SHORTNESS OF BREATH: 0

## 2020-05-27 NOTE — PROGRESS NOTES
Relation Age of Onset    No Known Problems Mother     Cancer Father 66        Colon    Other Father         AAA    Hypertension Father     Cancer Brother 47        Lung    COPD Brother        Social History     Socioeconomic History    Marital status:      Spouse name: None    Number of children: 2    Years of education: None    Highest education level: None   Occupational History     Employer: [employer]   Social Needs    Financial resource strain: None    Food insecurity     Worry: None     Inability: None    Transportation needs     Medical: None     Non-medical: None   Tobacco Use    Smoking status: Never Smoker    Smokeless tobacco: Never Used   Substance and Sexual Activity    Alcohol use: Not Currently    Drug use: No    Sexual activity: None   Lifestyle    Physical activity     Days per week: None     Minutes per session: None    Stress: None   Relationships    Social connections     Talks on phone: None     Gets together: None     Attends Nondenominational service: None     Active member of club or organization: None     Attends meetings of clubs or organizations: None     Relationship status: None    Intimate partner violence     Fear of current or ex partner: None     Emotionally abused: None     Physically abused: None     Forced sexual activity: None   Other Topics Concern    None   Social History Narrative    None       OBJECTIVE    There were no vitals taken for this visit. Wt Readings from Last 3 Encounters:   05/22/20 181 lb (82.1 kg)   05/22/20 181 lb (82.1 kg)   02/13/20 181 lb (82.1 kg)       Physical Exam  Constitutional:       General: She is not in acute distress. HENT:      Head: Normocephalic and atraumatic. Pulmonary:      Effort: No respiratory distress. Neurological:      Mental Status: She is alert and oriented to person, place, and time.    Psychiatric:         Mood and Affect: Mood normal.         Speech: Speech normal.         Behavior:

## 2020-06-01 ENCOUNTER — OFFICE VISIT (OUTPATIENT)
Dept: ORTHOPEDIC SURGERY | Age: 55
End: 2020-06-01
Payer: COMMERCIAL

## 2020-06-01 VITALS — TEMPERATURE: 98 F | WEIGHT: 170 LBS | BODY MASS INDEX: 27.32 KG/M2 | HEIGHT: 66 IN

## 2020-06-01 PROCEDURE — 99213 OFFICE O/P EST LOW 20 MIN: CPT | Performed by: ORTHOPAEDIC SURGERY

## 2020-06-01 PROCEDURE — G8419 CALC BMI OUT NRM PARAM NOF/U: HCPCS | Performed by: ORTHOPAEDIC SURGERY

## 2020-06-01 PROCEDURE — 1036F TOBACCO NON-USER: CPT | Performed by: ORTHOPAEDIC SURGERY

## 2020-06-01 PROCEDURE — G8427 DOCREV CUR MEDS BY ELIG CLIN: HCPCS | Performed by: ORTHOPAEDIC SURGERY

## 2020-06-01 PROCEDURE — 3017F COLORECTAL CA SCREEN DOC REV: CPT | Performed by: ORTHOPAEDIC SURGERY

## 2020-06-01 NOTE — PROGRESS NOTES
Depression     GERD (gastroesophageal reflux disease)        Past Surgical History:   Procedure Laterality Date    ANKLE SURGERY Left     APPENDECTOMY  1986    CHOLECYSTECTOMY      GALLBLADDER SURGERY  1993    HYSTERECTOMY      SHOULDER SURGERY Right        Allergies   Allergen Reactions    Sulfa Antibiotics Itching    Vancomycin Itching    Penicillins Rash       Social History     Socioeconomic History    Marital status:      Spouse name: None    Number of children: 2    Years of education: None    Highest education level: None   Occupational History     Employer: [employer]    Social Needs    Financial resource strain: None    Food insecurity     Worry: None     Inability: None    Transportation needs     Medical: None     Non-medical: None   Tobacco Use    Smoking status: Never Smoker    Smokeless tobacco: Never Used   Substance and Sexual Activity    Alcohol use: Not Currently    Drug use: No    Sexual activity: None   Lifestyle    Physical activity     Days per week: None     Minutes per session: None    Stress: None   Relationships    Social connections     Talks on phone: None     Gets together: None     Attends Spiritism service: None     Active member of club or organization: None     Attends meetings of clubs or organizations: None     Relationship status: None    Intimate partner violence     Fear of current or ex partner: None     Emotionally abused: None     Physically abused: None     Forced sexual activity: None   Other Topics Concern    None   Social History Narrative    None       Review of Systems  As follows except as previously noted in HPI:  Constitutional: Negative for chills, diaphoresis, fatigue, fever and unexpected weight change. Respiratory: Negative for cough, shortness of breath and wheezing. Cardiovascular: Negative for chest pain and palpitations. Neurological: Negative for dizziness, syncope, cephalgia.   GI / : negative  Musculoskeletal: see HPI       Objective:   Physical Exam   Constitutional: Oriented to person, place, and time. and appears well-developed and well-nourished. :   Head: Normocephalic and atraumatic. Eyes: EOM are normal.   Neck: Neck supple. Cardiovascular: Normal rate and regular rhythm. Pulmonary/Chest: Effort normal. No stridor. No respiratory distress, no wheezes. Abdominal:  No abnormal distension. Neurological: Alert and oriented to person, place, and time. Skin: Skin is warm and dry. Psychiatric: Normal mood and affect.  Behavior is normal. Thought content normal.    EMANUEL Rodgers DO    6/1/20  10:44 AM

## 2020-06-04 ENCOUNTER — HOSPITAL ENCOUNTER (OUTPATIENT)
Age: 55
Discharge: HOME | End: 2020-06-06
Payer: COMMERCIAL

## 2020-06-04 PROCEDURE — U0003 INFECTIOUS AGENT DETECTION BY NUCLEIC ACID (DNA OR RNA); SEVERE ACUTE RESPIRATORY SYNDROME CORONAVIRUS 2 (SARS-COV-2) (CORONAVIRUS DISEASE [COVID-19]), AMPLIFIED PROBE TECHNIQUE, MAKING USE OF HIGH THROUGHPUT TECHNOLOGIES AS DESCRIBED BY CMS-2020-01-R: HCPCS

## 2020-06-04 RX ORDER — MULTIVIT WITH MINERALS/LUTEIN
1000 TABLET ORAL DAILY
COMMUNITY
End: 2020-10-07

## 2020-06-06 LAB
SARS-COV-2: NOT DETECTED
SOURCE: NORMAL

## 2020-06-09 ENCOUNTER — ANESTHESIA EVENT (OUTPATIENT)
Dept: OPERATING ROOM | Age: 55
End: 2020-06-09
Payer: COMMERCIAL

## 2020-06-10 ASSESSMENT — LIFESTYLE VARIABLES: SMOKING_STATUS: 0

## 2020-06-11 ENCOUNTER — ANESTHESIA (OUTPATIENT)
Dept: OPERATING ROOM | Age: 55
End: 2020-06-11
Payer: COMMERCIAL

## 2020-06-11 ENCOUNTER — HOSPITAL ENCOUNTER (OUTPATIENT)
Age: 55
Setting detail: OUTPATIENT SURGERY
Discharge: HOME | End: 2020-06-11
Attending: ORTHOPAEDIC SURGERY | Admitting: ORTHOPAEDIC SURGERY
Payer: COMMERCIAL

## 2020-06-11 VITALS
SYSTOLIC BLOOD PRESSURE: 118 MMHG | WEIGHT: 181 LBS | HEART RATE: 81 BPM | HEIGHT: 66 IN | TEMPERATURE: 98.2 F | RESPIRATION RATE: 18 BRPM | OXYGEN SATURATION: 92 % | BODY MASS INDEX: 29.09 KG/M2 | DIASTOLIC BLOOD PRESSURE: 60 MMHG

## 2020-06-11 VITALS — DIASTOLIC BLOOD PRESSURE: 64 MMHG | SYSTOLIC BLOOD PRESSURE: 105 MMHG | OXYGEN SATURATION: 99 %

## 2020-06-11 PROBLEM — M65.9 SYNOVITIS OF LEFT KNEE: Chronic | Status: ACTIVE | Noted: 2020-06-11

## 2020-06-11 PROBLEM — M94.262 CHONDROMALACIA, LEFT KNEE: Chronic | Status: ACTIVE | Noted: 2020-06-11

## 2020-06-11 PROBLEM — M23.222 DERANGEMENT OF POSTERIOR HORN OF MEDIAL MENISCUS DUE TO OLD TEAR OR INJURY, LEFT KNEE: Chronic | Status: ACTIVE | Noted: 2020-06-11

## 2020-06-11 PROBLEM — M65.962 SYNOVITIS OF LEFT KNEE: Chronic | Status: ACTIVE | Noted: 2020-06-11

## 2020-06-11 PROCEDURE — 2720000010 HC SURG SUPPLY STERILE: Performed by: ORTHOPAEDIC SURGERY

## 2020-06-11 PROCEDURE — 2500000003 HC RX 250 WO HCPCS: Performed by: ORTHOPAEDIC SURGERY

## 2020-06-11 PROCEDURE — 3600000013 HC SURGERY LEVEL 3 ADDTL 15MIN: Performed by: ORTHOPAEDIC SURGERY

## 2020-06-11 PROCEDURE — 2580000003 HC RX 258: Performed by: ORTHOPAEDIC SURGERY

## 2020-06-11 PROCEDURE — 3600000003 HC SURGERY LEVEL 3 BASE: Performed by: ORTHOPAEDIC SURGERY

## 2020-06-11 PROCEDURE — 3700000000 HC ANESTHESIA ATTENDED CARE: Performed by: ORTHOPAEDIC SURGERY

## 2020-06-11 PROCEDURE — 6370000000 HC RX 637 (ALT 250 FOR IP): Performed by: ANESTHESIOLOGY

## 2020-06-11 PROCEDURE — 29881 ARTHRS KNE SRG MNISECTMY M/L: CPT | Performed by: ORTHOPAEDIC SURGERY

## 2020-06-11 PROCEDURE — 7100000011 HC PHASE II RECOVERY - ADDTL 15 MIN: Performed by: ORTHOPAEDIC SURGERY

## 2020-06-11 PROCEDURE — 7100000010 HC PHASE II RECOVERY - FIRST 15 MIN: Performed by: ORTHOPAEDIC SURGERY

## 2020-06-11 PROCEDURE — 2580000003 HC RX 258: Performed by: ANESTHESIOLOGY

## 2020-06-11 PROCEDURE — 3700000001 HC ADD 15 MINUTES (ANESTHESIA): Performed by: ORTHOPAEDIC SURGERY

## 2020-06-11 PROCEDURE — 6360000002 HC RX W HCPCS: Performed by: NURSE ANESTHETIST, CERTIFIED REGISTERED

## 2020-06-11 PROCEDURE — 2500000003 HC RX 250 WO HCPCS: Performed by: NURSE ANESTHETIST, CERTIFIED REGISTERED

## 2020-06-11 PROCEDURE — 6360000002 HC RX W HCPCS

## 2020-06-11 PROCEDURE — 7100000000 HC PACU RECOVERY - FIRST 15 MIN: Performed by: ORTHOPAEDIC SURGERY

## 2020-06-11 PROCEDURE — 7100000001 HC PACU RECOVERY - ADDTL 15 MIN: Performed by: ORTHOPAEDIC SURGERY

## 2020-06-11 PROCEDURE — 2709999900 HC NON-CHARGEABLE SUPPLY: Performed by: ORTHOPAEDIC SURGERY

## 2020-06-11 PROCEDURE — 6360000002 HC RX W HCPCS: Performed by: ANESTHESIOLOGY

## 2020-06-11 RX ORDER — METOCLOPRAMIDE 10 MG/1
10 TABLET ORAL ONCE
Status: COMPLETED | OUTPATIENT
Start: 2020-06-11 | End: 2020-06-11

## 2020-06-11 RX ORDER — LABETALOL HYDROCHLORIDE 5 MG/ML
INJECTION, SOLUTION INTRAVENOUS PRN
Status: DISCONTINUED | OUTPATIENT
Start: 2020-06-11 | End: 2020-06-11 | Stop reason: SDUPTHER

## 2020-06-11 RX ORDER — MEPERIDINE HYDROCHLORIDE 25 MG/ML
12.5 INJECTION INTRAMUSCULAR; INTRAVENOUS; SUBCUTANEOUS EVERY 5 MIN PRN
Status: DISCONTINUED | OUTPATIENT
Start: 2020-06-11 | End: 2020-06-11 | Stop reason: HOSPADM

## 2020-06-11 RX ORDER — LIDOCAINE HYDROCHLORIDE 20 MG/ML
INJECTION, SOLUTION INTRAVENOUS PRN
Status: DISCONTINUED | OUTPATIENT
Start: 2020-06-11 | End: 2020-06-11 | Stop reason: SDUPTHER

## 2020-06-11 RX ORDER — FENTANYL CITRATE 50 UG/ML
50 INJECTION, SOLUTION INTRAMUSCULAR; INTRAVENOUS EVERY 5 MIN PRN
Status: DISCONTINUED | OUTPATIENT
Start: 2020-06-11 | End: 2020-06-11 | Stop reason: HOSPADM

## 2020-06-11 RX ORDER — ONDANSETRON 2 MG/ML
INJECTION INTRAMUSCULAR; INTRAVENOUS PRN
Status: DISCONTINUED | OUTPATIENT
Start: 2020-06-11 | End: 2020-06-11 | Stop reason: SDUPTHER

## 2020-06-11 RX ORDER — FAMOTIDINE 20 MG/1
20 TABLET, FILM COATED ORAL ONCE
Status: COMPLETED | OUTPATIENT
Start: 2020-06-11 | End: 2020-06-11

## 2020-06-11 RX ORDER — FENTANYL CITRATE 50 UG/ML
INJECTION, SOLUTION INTRAMUSCULAR; INTRAVENOUS PRN
Status: DISCONTINUED | OUTPATIENT
Start: 2020-06-11 | End: 2020-06-11 | Stop reason: SDUPTHER

## 2020-06-11 RX ORDER — HYDROCODONE BITARTRATE AND ACETAMINOPHEN 5; 325 MG/1; MG/1
1 TABLET ORAL EVERY 4 HOURS PRN
Qty: 40 TABLET | Refills: 0 | Status: SHIPPED | OUTPATIENT
Start: 2020-06-11 | End: 2020-06-18

## 2020-06-11 RX ORDER — PROMETHAZINE HYDROCHLORIDE 25 MG/ML
25 INJECTION, SOLUTION INTRAMUSCULAR; INTRAVENOUS
Status: DISCONTINUED | OUTPATIENT
Start: 2020-06-11 | End: 2020-06-11 | Stop reason: HOSPADM

## 2020-06-11 RX ORDER — BUPIVACAINE HYDROCHLORIDE 2.5 MG/ML
INJECTION, SOLUTION EPIDURAL; INFILTRATION; INTRACAUDAL PRN
Status: DISCONTINUED | OUTPATIENT
Start: 2020-06-11 | End: 2020-06-11 | Stop reason: ALTCHOICE

## 2020-06-11 RX ORDER — MAGNESIUM HYDROXIDE 1200 MG/15ML
LIQUID ORAL CONTINUOUS PRN
Status: COMPLETED | OUTPATIENT
Start: 2020-06-11 | End: 2020-06-11

## 2020-06-11 RX ORDER — PROPOFOL 10 MG/ML
INJECTION, EMULSION INTRAVENOUS PRN
Status: DISCONTINUED | OUTPATIENT
Start: 2020-06-11 | End: 2020-06-11 | Stop reason: SDUPTHER

## 2020-06-11 RX ORDER — GLYCOPYRROLATE 1 MG/5 ML
SYRINGE (ML) INTRAVENOUS PRN
Status: DISCONTINUED | OUTPATIENT
Start: 2020-06-11 | End: 2020-06-11 | Stop reason: SDUPTHER

## 2020-06-11 RX ORDER — OXYCODONE HYDROCHLORIDE AND ACETAMINOPHEN 5; 325 MG/1; MG/1
1 TABLET ORAL EVERY 4 HOURS PRN
Status: DISCONTINUED | OUTPATIENT
Start: 2020-06-11 | End: 2020-06-11 | Stop reason: HOSPADM

## 2020-06-11 RX ORDER — MIDAZOLAM HYDROCHLORIDE 1 MG/ML
INJECTION INTRAMUSCULAR; INTRAVENOUS PRN
Status: DISCONTINUED | OUTPATIENT
Start: 2020-06-11 | End: 2020-06-11 | Stop reason: SDUPTHER

## 2020-06-11 RX ORDER — MEPERIDINE HYDROCHLORIDE 25 MG/ML
INJECTION INTRAMUSCULAR; INTRAVENOUS; SUBCUTANEOUS
Status: DISCONTINUED
Start: 2020-06-11 | End: 2020-06-11 | Stop reason: HOSPADM

## 2020-06-11 RX ORDER — LABETALOL HYDROCHLORIDE 5 MG/ML
5 INJECTION, SOLUTION INTRAVENOUS EVERY 10 MIN PRN
Status: DISCONTINUED | OUTPATIENT
Start: 2020-06-11 | End: 2020-06-11 | Stop reason: HOSPADM

## 2020-06-11 RX ORDER — KETOROLAC TROMETHAMINE 30 MG/ML
INJECTION, SOLUTION INTRAMUSCULAR; INTRAVENOUS PRN
Status: DISCONTINUED | OUTPATIENT
Start: 2020-06-11 | End: 2020-06-11 | Stop reason: SDUPTHER

## 2020-06-11 RX ORDER — DEXAMETHASONE SODIUM PHOSPHATE 10 MG/ML
INJECTION, SOLUTION INTRAMUSCULAR; INTRAVENOUS PRN
Status: DISCONTINUED | OUTPATIENT
Start: 2020-06-11 | End: 2020-06-11 | Stop reason: SDUPTHER

## 2020-06-11 RX ORDER — SODIUM CHLORIDE, SODIUM LACTATE, POTASSIUM CHLORIDE, CALCIUM CHLORIDE 600; 310; 30; 20 MG/100ML; MG/100ML; MG/100ML; MG/100ML
INJECTION, SOLUTION INTRAVENOUS CONTINUOUS
Status: DISCONTINUED | OUTPATIENT
Start: 2020-06-11 | End: 2020-06-11 | Stop reason: HOSPADM

## 2020-06-11 RX ORDER — DIPHENHYDRAMINE HYDROCHLORIDE 50 MG/ML
12.5 INJECTION INTRAMUSCULAR; INTRAVENOUS
Status: DISCONTINUED | OUTPATIENT
Start: 2020-06-11 | End: 2020-06-11 | Stop reason: HOSPADM

## 2020-06-11 RX ORDER — HYDROCODONE BITARTRATE AND ACETAMINOPHEN 5; 325 MG/1; MG/1
2 TABLET ORAL PRN
Status: COMPLETED | OUTPATIENT
Start: 2020-06-11 | End: 2020-06-11

## 2020-06-11 RX ORDER — MORPHINE SULFATE 2 MG/ML
1 INJECTION, SOLUTION INTRAMUSCULAR; INTRAVENOUS EVERY 5 MIN PRN
Status: DISCONTINUED | OUTPATIENT
Start: 2020-06-11 | End: 2020-06-11 | Stop reason: HOSPADM

## 2020-06-11 RX ORDER — HYDROCODONE BITARTRATE AND ACETAMINOPHEN 5; 325 MG/1; MG/1
1 TABLET ORAL PRN
Status: COMPLETED | OUTPATIENT
Start: 2020-06-11 | End: 2020-06-11

## 2020-06-11 RX ORDER — HYDRALAZINE HYDROCHLORIDE 20 MG/ML
5 INJECTION INTRAMUSCULAR; INTRAVENOUS EVERY 10 MIN PRN
Status: DISCONTINUED | OUTPATIENT
Start: 2020-06-11 | End: 2020-06-11 | Stop reason: HOSPADM

## 2020-06-11 RX ADMIN — MEPERIDINE HYDROCHLORIDE 12.5 MG: 25 INJECTION, SOLUTION INTRAMUSCULAR; INTRAVENOUS; SUBCUTANEOUS at 10:37

## 2020-06-11 RX ADMIN — LABETALOL HYDROCHLORIDE 2.5 MG: 5 INJECTION INTRAVENOUS at 09:40

## 2020-06-11 RX ADMIN — DEXAMETHASONE SODIUM PHOSPHATE 10 MG: 10 INJECTION, SOLUTION INTRAMUSCULAR; INTRAVENOUS at 09:27

## 2020-06-11 RX ADMIN — METOCLOPRAMIDE 10 MG: 10 TABLET ORAL at 08:08

## 2020-06-11 RX ADMIN — FAMOTIDINE 20 MG: 20 TABLET ORAL at 08:08

## 2020-06-11 RX ADMIN — LIDOCAINE HYDROCHLORIDE 50 MG: 20 INJECTION, SOLUTION INTRAVENOUS at 09:10

## 2020-06-11 RX ADMIN — FENTANYL CITRATE 50 MCG: 50 INJECTION, SOLUTION INTRAMUSCULAR; INTRAVENOUS at 09:20

## 2020-06-11 RX ADMIN — MEPERIDINE HYDROCHLORIDE 12.5 MG: 25 INJECTION, SOLUTION INTRAMUSCULAR; INTRAVENOUS; SUBCUTANEOUS at 10:32

## 2020-06-11 RX ADMIN — FENTANYL CITRATE 50 MCG: 50 INJECTION, SOLUTION INTRAMUSCULAR; INTRAVENOUS at 09:35

## 2020-06-11 RX ADMIN — SODIUM CHLORIDE, POTASSIUM CHLORIDE, SODIUM LACTATE AND CALCIUM CHLORIDE: 600; 310; 30; 20 INJECTION, SOLUTION INTRAVENOUS at 08:29

## 2020-06-11 RX ADMIN — HYDROMORPHONE HYDROCHLORIDE 0.25 MG: 1 INJECTION, SOLUTION INTRAMUSCULAR; INTRAVENOUS; SUBCUTANEOUS at 10:43

## 2020-06-11 RX ADMIN — FENTANYL CITRATE 50 MCG: 50 INJECTION, SOLUTION INTRAMUSCULAR; INTRAVENOUS at 09:42

## 2020-06-11 RX ADMIN — Medication 0.2 MG: at 09:05

## 2020-06-11 RX ADMIN — MIDAZOLAM 2 MG: 1 INJECTION INTRAMUSCULAR; INTRAVENOUS at 09:02

## 2020-06-11 RX ADMIN — PROPOFOL 200 MG: 10 INJECTION, EMULSION INTRAVENOUS at 09:10

## 2020-06-11 RX ADMIN — FENTANYL CITRATE 50 MCG: 50 INJECTION, SOLUTION INTRAMUSCULAR; INTRAVENOUS at 09:09

## 2020-06-11 RX ADMIN — KETOROLAC TROMETHAMINE 30 MG: 30 INJECTION, SOLUTION INTRAMUSCULAR; INTRAVENOUS at 10:00

## 2020-06-11 RX ADMIN — HYDROCODONE BITARTRATE AND ACETAMINOPHEN 2 TABLET: 5; 325 TABLET ORAL at 11:10

## 2020-06-11 RX ADMIN — HYDROMORPHONE HYDROCHLORIDE 0.25 MG: 1 INJECTION, SOLUTION INTRAMUSCULAR; INTRAVENOUS; SUBCUTANEOUS at 10:52

## 2020-06-11 RX ADMIN — FENTANYL CITRATE 50 MCG: 50 INJECTION, SOLUTION INTRAMUSCULAR; INTRAVENOUS at 09:05

## 2020-06-11 RX ADMIN — ONDANSETRON 4 MG: 2 INJECTION INTRAMUSCULAR; INTRAVENOUS at 09:51

## 2020-06-11 ASSESSMENT — PULMONARY FUNCTION TESTS
PIF_VALUE: 16
PIF_VALUE: 21
PIF_VALUE: 15
PIF_VALUE: 16
PIF_VALUE: 17
PIF_VALUE: 7
PIF_VALUE: 12
PIF_VALUE: 17
PIF_VALUE: 0
PIF_VALUE: 15
PIF_VALUE: 17
PIF_VALUE: 0
PIF_VALUE: 15
PIF_VALUE: 2
PIF_VALUE: 15
PIF_VALUE: 17
PIF_VALUE: 2
PIF_VALUE: 15
PIF_VALUE: 12
PIF_VALUE: 15
PIF_VALUE: 0
PIF_VALUE: 1
PIF_VALUE: 3
PIF_VALUE: 16
PIF_VALUE: 0
PIF_VALUE: 14
PIF_VALUE: 3
PIF_VALUE: 14
PIF_VALUE: 20
PIF_VALUE: 17
PIF_VALUE: 15
PIF_VALUE: 13
PIF_VALUE: 15
PIF_VALUE: 15
PIF_VALUE: 19
PIF_VALUE: 10
PIF_VALUE: 5
PIF_VALUE: 15
PIF_VALUE: 18
PIF_VALUE: 16
PIF_VALUE: 17
PIF_VALUE: 15
PIF_VALUE: 16
PIF_VALUE: 15
PIF_VALUE: 15
PIF_VALUE: 17
PIF_VALUE: 14
PIF_VALUE: 4
PIF_VALUE: 3
PIF_VALUE: 18
PIF_VALUE: 15
PIF_VALUE: 17
PIF_VALUE: 16
PIF_VALUE: 15
PIF_VALUE: 14
PIF_VALUE: 0
PIF_VALUE: 11
PIF_VALUE: 0
PIF_VALUE: 16
PIF_VALUE: 15
PIF_VALUE: 14
PIF_VALUE: 14
PIF_VALUE: 1
PIF_VALUE: 12
PIF_VALUE: 16
PIF_VALUE: 16

## 2020-06-11 ASSESSMENT — PAIN DESCRIPTION - ORIENTATION: ORIENTATION: LEFT

## 2020-06-11 ASSESSMENT — PAIN SCALES - GENERAL
PAINLEVEL_OUTOF10: 6
PAINLEVEL_OUTOF10: 8
PAINLEVEL_OUTOF10: 0
PAINLEVEL_OUTOF10: 8
PAINLEVEL_OUTOF10: 5

## 2020-06-11 ASSESSMENT — PAIN DESCRIPTION - FREQUENCY
FREQUENCY: CONTINUOUS
FREQUENCY: CONTINUOUS

## 2020-06-11 ASSESSMENT — PAIN DESCRIPTION - DESCRIPTORS
DESCRIPTORS: ACHING
DESCRIPTORS: PRESSURE

## 2020-06-11 ASSESSMENT — PAIN DESCRIPTION - PAIN TYPE: TYPE: ACUTE PAIN

## 2020-06-11 ASSESSMENT — PAIN DESCRIPTION - LOCATION: LOCATION: KNEE

## 2020-06-11 ASSESSMENT — PAIN - FUNCTIONAL ASSESSMENT: PAIN_FUNCTIONAL_ASSESSMENT: 0-10

## 2020-06-11 NOTE — ANESTHESIA PRE PROCEDURE
mLs into the muscle once for 1 dose Use as directed for allergic reaction 2/26/20 5/27/20  Randell Hess DO       Current medications:    No current facility-administered medications for this encounter. Current Outpatient Medications   Medication Sig Dispense Refill    vitamin E 1000 units capsule Take 1,000 Units by mouth daily      budesonide-formoterol (SYMBICORT) 160-4.5 MCG/ACT AERO Inhale 2 puffs into the lungs 2 times daily (Patient taking differently: Inhale 2 puffs into the lungs 2 times daily as needed ) 1 Inhaler 0    albuterol sulfate  (90 Base) MCG/ACT inhaler Inhale 2 puffs into the lungs 4 times daily as needed for Wheezing 1 Inhaler 0    diazepam (VALIUM) 5 MG tablet Take 5 mg by mouth nightly as needed for Anxiety.  PREBIOTIC PRODUCT PO Take by mouth daily       Probiotic Product (PROBIOTIC DAILY PO) Take by mouth daily       vitamin D3 (CHOLECALCIFEROL) 5000 units TABS tablet Take 1 tablet by mouth daily (Patient taking differently: Take 5,000 Units by mouth daily ) 90 tablet 1    traZODone (DESYREL) 100 MG tablet Take 100 mg by mouth nightly       Omega-3 Fatty Acids (FISH OIL) 1200 MG CPDR Take 1 tablet by mouth 2 times daily 2 caps  Stopped 5 days pre op      venlafaxine (EFFEXOR XR) 150 MG extended release capsule Take 150 mg by mouth nightly Takes 2 tabs      busPIRone (BUSPAR) 30 MG tablet Take 30 mg by mouth 2 times daily Takes only 10 mg for PM dose      EPINEPHrine (EPIPEN 2-YURIY) 0.3 MG/0.3ML SOAJ injection Inject 0.3 mLs into the muscle once for 1 dose Use as directed for allergic reaction 0.3 mL 0       Allergies:     Allergies   Allergen Reactions    Sulfa Antibiotics Itching    Vancomycin Itching    Penicillins Rash       Problem List:    Patient Active Problem List   Diagnosis Code    Vitamin D deficiency E55.9    Dyslipidemia E78.5    Mild intermittent asthma without complication R80.55    Bee allergy status Z91.030    Chronic pain syndrome
Allergies   Allergen Reactions    Sulfa Antibiotics Itching    Vancomycin Itching    Penicillins Rash       Problem List:    Patient Active Problem List   Diagnosis Code    Vitamin D deficiency E55.9    Dyslipidemia E78.5    Mild intermittent asthma without complication D69.60    Bee allergy status Z91.030    Chronic pain syndrome G89.4    Lumbar facet arthropathy M47.816    Lumbar disc disorder M51.9    Lumbar spondylosis M47.816    JOSÉ MIGUEL (obstructive sleep apnea) G47.33    Esophagitis K20.9    Derangement of posterior horn of medial meniscus due to old tear or injury, left knee M23.222    Chondromalacia, left knee M94.262    Synovitis of left knee M65.9       Past Medical History:        Diagnosis Date    Anxiety     Asthma     Chávez's esophagus     Depression     GERD (gastroesophageal reflux disease)     Sleep apnea     uses cpap    Thyroid disease     newly dx hypothyroid  (no meds ordered yet)       Past Surgical History:        Procedure Laterality Date    ANKLE SURGERY Left     APPENDECTOMY  1986    BREAST SURGERY      bx x2 left neg    CHOLECYSTECTOMY  1993    COLONOSCOPY      ENDOSCOPY, COLON, DIAGNOSTIC      HAND SURGERY Left     HYSTERECTOMY      KNEE ARTHROSCOPY Left 06/11/2020    Arthroscopic Exam and Treatment of left knee    SHOULDER SURGERY Right        Social History:    Social History     Tobacco Use    Smoking status: Never Smoker    Smokeless tobacco: Never Used   Substance Use Topics    Alcohol use: Yes     Comment: occas                                Counseling given: Not Answered      Vital Signs (Current):   Vitals:    06/11/20 1054 06/11/20 1105 06/11/20 1120 06/11/20 1126   BP: 115/63 114/67 (!) 117/59 118/60   Pulse: 75 79 84 81   Resp: 14 16 16 18   Temp:       SpO2: 99% 96% 93% 92%   Weight:       Height:                                                  BP Readings from Last 3 Encounters:   06/11/20 118/60   02/13/20 109/71   07/25/19 122/81

## 2020-06-22 ENCOUNTER — HOSPITAL ENCOUNTER (OUTPATIENT)
Age: 55
Discharge: HOME | End: 2020-06-24
Payer: COMMERCIAL

## 2020-06-22 ENCOUNTER — OFFICE VISIT (OUTPATIENT)
Dept: ORTHOPEDIC SURGERY | Age: 55
End: 2020-06-22

## 2020-06-22 VITALS — BODY MASS INDEX: 28.25 KG/M2 | TEMPERATURE: 98 F | WEIGHT: 180 LBS | HEIGHT: 67 IN

## 2020-06-22 LAB
T4 FREE: 1.05 NG/DL (ref 0.93–1.7)
TSH SERPL DL<=0.05 MIU/L-ACNC: 5.73 UIU/ML (ref 0.27–4.2)

## 2020-06-22 PROCEDURE — 84443 ASSAY THYROID STIM HORMONE: CPT

## 2020-06-22 PROCEDURE — 36415 COLL VENOUS BLD VENIPUNCTURE: CPT

## 2020-06-22 PROCEDURE — 84439 ASSAY OF FREE THYROXINE: CPT

## 2020-06-22 PROCEDURE — 99024 POSTOP FOLLOW-UP VISIT: CPT | Performed by: ORTHOPAEDIC SURGERY

## 2020-06-22 NOTE — PROGRESS NOTES
noted in HPI:  Constitutional: Negative for chills, diaphoresis, fatigue, fever and unexpected weight change. Respiratory: Negative for cough, shortness of breath and wheezing. Cardiovascular: Negative for chest pain and palpitations. Neurological: Negative for dizziness, syncope, cephalgia. GI / : negative  Musculoskeletal: see HPI       Objective:   Physical Exam   Constitutional: Oriented to person, place, and time. and appears well-developed and well-nourished. :   Head: Normocephalic and atraumatic. Eyes: EOM are normal.   Neck: Neck supple. Cardiovascular: Normal rate and regular rhythm. Pulmonary/Chest: Effort normal. No stridor. No respiratory distress, no wheezes. Abdominal:  No abnormal distension. Neurological: Alert and oriented to person, place, and time. Skin: Skin is warm and dry. Psychiatric: Normal mood and affect.  Behavior is normal. Thought content normal.    EMANUEL Palma DO    6/22/20  1:36 PM

## 2020-07-13 ENCOUNTER — OFFICE VISIT (OUTPATIENT)
Dept: ORTHOPEDIC SURGERY | Age: 55
End: 2020-07-13

## 2020-07-13 VITALS — HEIGHT: 67 IN | WEIGHT: 180 LBS | BODY MASS INDEX: 28.25 KG/M2 | TEMPERATURE: 98 F

## 2020-07-13 PROCEDURE — 99024 POSTOP FOLLOW-UP VISIT: CPT | Performed by: ORTHOPAEDIC SURGERY

## 2020-07-13 NOTE — PROGRESS NOTES
0    diazepam (VALIUM) 5 MG tablet Take 5 mg by mouth nightly as needed for Anxiety.  PREBIOTIC PRODUCT PO Take by mouth daily       Probiotic Product (PROBIOTIC DAILY PO) Take by mouth daily       vitamin D3 (CHOLECALCIFEROL) 5000 units TABS tablet Take 1 tablet by mouth daily (Patient taking differently: Take 5,000 Units by mouth daily ) 90 tablet 1    traZODone (DESYREL) 100 MG tablet Take 100 mg by mouth nightly       Omega-3 Fatty Acids (FISH OIL) 1200 MG CPDR Take 1 tablet by mouth 2 times daily 2 caps  Stopped 5 days pre op      venlafaxine (EFFEXOR XR) 150 MG extended release capsule Take 150 mg by mouth nightly Takes 2 tabs      busPIRone (BUSPAR) 30 MG tablet Take 30 mg by mouth 2 times daily Takes only 10 mg for PM dose      EPINEPHrine (EPIPEN 2-YURIY) 0.3 MG/0.3ML SOAJ injection Inject 0.3 mLs into the muscle once for 1 dose Use as directed for allergic reaction 0.3 mL 0     No current facility-administered medications for this visit.         Patient Active Problem List   Diagnosis    Vitamin D deficiency    Dyslipidemia    Mild intermittent asthma without complication    Bee allergy status    Chronic pain syndrome    Lumbar facet arthropathy    Lumbar disc disorder    Lumbar spondylosis    JOSÉ MIGUEL (obstructive sleep apnea)    Esophagitis    Derangement of posterior horn of medial meniscus due to old tear or injury, left knee    Chondromalacia, left knee    Synovitis of left knee       Past Medical History:   Diagnosis Date    Anxiety     Asthma     Chávez's esophagus     Depression     GERD (gastroesophageal reflux disease)     Sleep apnea     uses cpap    Thyroid disease     newly dx hypothyroid  (no meds ordered yet)       Past Surgical History:   Procedure Laterality Date    ANKLE SURGERY Left     APPENDECTOMY  1986    BREAST SURGERY      bx x2 left neg    CHOLECYSTECTOMY  1993    COLONOSCOPY      ENDOSCOPY, COLON, DIAGNOSTIC      HAND SURGERY Left     HYSTERECTOMY  KNEE ARTHROSCOPY Left 06/11/2020    Arthroscopic Exam and Treatment of left knee    KNEE ARTHROSCOPY Left 6/11/2020    ARTHROSCOPIC EXAM AND TREATMENT LEFT KNEE, MEDIAL MENISECTOMY, CHONDROPASTY AND SYNOVECTOMY performed by Patrice Bueno DO at [address] Right        Allergies   Allergen Reactions    Sulfa Antibiotics Itching    Vancomycin Itching    Penicillins Rash       Social History     Socioeconomic History    Marital status:      Spouse name: None    Number of children: 2    Years of education: None    Highest education level: None   Occupational History     Employer: [employer]    Social Needs    Financial resource strain: None    Food insecurity     Worry: None     Inability: None    Transportation needs     Medical: None     Non-medical: None   Tobacco Use    Smoking status: Never Smoker    Smokeless tobacco: Never Used   Substance and Sexual Activity    Alcohol use: Yes     Comment: occas    Drug use: No    Sexual activity: None   Lifestyle    Physical activity     Days per week: None     Minutes per session: None    Stress: None   Relationships    Social connections     Talks on phone: None     Gets together: None     Attends Jehovah's witness service: None     Active member of club or organization: None     Attends meetings of clubs or organizations: None     Relationship status: None    Intimate partner violence     Fear of current or ex partner: None     Emotionally abused: None     Physically abused: None     Forced sexual activity: None   Other Topics Concern    None   Social History Narrative    None       Review of Systems  As follows except as previously noted in HPI:  Constitutional: Negative for chills, diaphoresis, fatigue, fever and unexpected weight change. Respiratory: Negative for cough, shortness of breath and wheezing. Cardiovascular: Negative for chest pain and palpitations.    Neurological: Negative for dizziness, syncope, cephalgia. GI / : negative  Musculoskeletal: see HPI       Objective:   Physical Exam   Constitutional: Oriented to person, place, and time. and appears well-developed and well-nourished. :   Head: Normocephalic and atraumatic. Eyes: EOM are normal.   Neck: Neck supple. Cardiovascular: Normal rate and regular rhythm. Pulmonary/Chest: Effort normal. No stridor. No respiratory distress, no wheezes. Abdominal:  No abnormal distension. Neurological: Alert and oriented to person, place, and time. Skin: Skin is warm and dry. Psychiatric: Normal mood and affect.  Behavior is normal. Thought content normal.    EMANUEL Arrington, DO    7/13/20  1:18 PM

## 2020-08-03 ENCOUNTER — OFFICE VISIT (OUTPATIENT)
Dept: ORTHOPEDIC SURGERY | Age: 55
End: 2020-08-03

## 2020-08-03 VITALS — BODY MASS INDEX: 28.93 KG/M2 | WEIGHT: 180 LBS | HEIGHT: 66 IN

## 2020-08-03 PROCEDURE — 99024 POSTOP FOLLOW-UP VISIT: CPT | Performed by: ORTHOPAEDIC SURGERY

## 2020-08-03 NOTE — PROGRESS NOTES
Chief Complaint:   Chief Complaint   Patient presents with    Follow-up     follow up from left knee arthroscopy DOS 6/11/2020       Tonya Damon is 53 days post arthroscopic me and clean up in the left knee. She is doing much better. She notes great improvement after couple sessions of physical therapy which she finally agreed to. She is continued to go and feels that she is approved for 10-12 visits. Allergies; medications; past medical, surgical, family, and social history; and problem list have been reviewed today and updated as indicated in this encounter seen below. Exam: The wounds are well-healed in the anterior left knee. There is minimal effusion. There is no redness or increased temperature. Range of motion of the left knee is very good. She has a slight amount of medial and lateral gapping. Is a slight amount of crepitus. She does not have a great deal of pain. Her calf is supple with no tenderness. Radiographs: Previous films were reviewed and were nonweightbearing but showed little joint space narrowing in the left knee. ASSESSMENT:    Ronna Goldstein was seen today for follow-up. Diagnoses and all orders for this visit:    Synovitis of left knee    Complex tear of medial meniscus of left knee, initial encounter    Chondromalacia, left knee        PLAN: Continue physical therapy through her allowed prescription. We will follow-up in about 3 weeks. She was also encouraged to be diligent with home exercising that she is shown by the therapist.    Return in about 3 weeks (around 8/24/2020).        Current Outpatient Medications   Medication Sig Dispense Refill    vitamin E 1000 units capsule Take 1,000 Units by mouth daily      budesonide-formoterol (SYMBICORT) 160-4.5 MCG/ACT AERO Inhale 2 puffs into the lungs 2 times daily 1 Inhaler 0    albuterol sulfate  (90 Base) MCG/ACT inhaler Inhale 2 puffs into the lungs 4 times daily as needed for Wheezing 1 Inhaler 0    diazepam (VALIUM) 5 MG tablet Take 5 mg by mouth nightly as needed for Anxiety.  PREBIOTIC PRODUCT PO Take by mouth daily       Probiotic Product (PROBIOTIC DAILY PO) Take by mouth daily       vitamin D3 (CHOLECALCIFEROL) 5000 units TABS tablet Take 1 tablet by mouth daily (Patient taking differently: Take 5,000 Units by mouth daily ) 90 tablet 1    traZODone (DESYREL) 100 MG tablet Take 100 mg by mouth nightly       Omega-3 Fatty Acids (FISH OIL) 1200 MG CPDR Take 1 tablet by mouth 2 times daily 2 caps  Stopped 5 days pre op      venlafaxine (EFFEXOR XR) 150 MG extended release capsule Take 150 mg by mouth nightly Takes 2 tabs      busPIRone (BUSPAR) 30 MG tablet Take 30 mg by mouth 2 times daily Takes only 10 mg for PM dose      EPINEPHrine (EPIPEN 2-YURIY) 0.3 MG/0.3ML SOAJ injection Inject 0.3 mLs into the muscle once for 1 dose Use as directed for allergic reaction 0.3 mL 0     No current facility-administered medications for this visit.         Patient Active Problem List   Diagnosis    Vitamin D deficiency    Dyslipidemia    Mild intermittent asthma without complication    Bee allergy status    Chronic pain syndrome    Lumbar facet arthropathy    Lumbar disc disorder    Lumbar spondylosis    JOSÉ MIGUEL (obstructive sleep apnea)    Esophagitis    Derangement of posterior horn of medial meniscus due to old tear or injury, left knee    Chondromalacia, left knee    Synovitis of left knee       Past Medical History:   Diagnosis Date    Anxiety     Asthma     Chávez's esophagus     Depression     GERD (gastroesophageal reflux disease)     Sleep apnea     uses cpap    Thyroid disease     newly dx hypothyroid  (no meds ordered yet)       Past Surgical History:   Procedure Laterality Date    ANKLE SURGERY Left     APPENDECTOMY  1986    BREAST SURGERY      bx x2 left neg    CHOLECYSTECTOMY  1993    COLONOSCOPY      ENDOSCOPY, COLON, DIAGNOSTIC      HAND SURGERY Left     HYSTERECTOMY      KNEE ARTHROSCOPY Left 06/11/2020    Arthroscopic Exam and Treatment of left knee    KNEE ARTHROSCOPY Left 6/11/2020    ARTHROSCOPIC EXAM AND TREATMENT LEFT KNEE, MEDIAL MENISECTOMY, CHONDROPASTY AND SYNOVECTOMY performed by Samra Cohn DO at [address] Right        Allergies   Allergen Reactions    Sulfa Antibiotics Itching    Vancomycin Itching    Penicillins Rash       Social History     Socioeconomic History    Marital status:      Spouse name: None    Number of children: 2    Years of education: None    Highest education level: None   Occupational History     Employer: [employer]    Social Needs    Financial resource strain: None    Food insecurity     Worry: None     Inability: None    Transportation needs     Medical: None     Non-medical: None   Tobacco Use    Smoking status: Never Smoker    Smokeless tobacco: Never Used   Substance and Sexual Activity    Alcohol use: Yes     Comment: occas    Drug use: No    Sexual activity: None   Lifestyle    Physical activity     Days per week: None     Minutes per session: None    Stress: None   Relationships    Social connections     Talks on phone: None     Gets together: None     Attends Episcopal service: None     Active member of club or organization: None     Attends meetings of clubs or organizations: None     Relationship status: None    Intimate partner violence     Fear of current or ex partner: None     Emotionally abused: None     Physically abused: None     Forced sexual activity: None   Other Topics Concern    None   Social History Narrative    None       Review of Systems  As follows except as previously noted in HPI:  Constitutional: Negative for chills, diaphoresis, fatigue, fever and unexpected weight change. Respiratory: Negative for cough, shortness of breath and wheezing. Cardiovascular: Negative for chest pain and palpitations.    Neurological: Negative for dizziness, syncope, cephalgia. GI / : negative  Musculoskeletal: see HPI       Objective:   Physical Exam   Constitutional: Oriented to person, place, and time. and appears well-developed and well-nourished. :   Head: Normocephalic and atraumatic. Eyes: EOM are normal.   Neck: Neck supple. Cardiovascular: Normal rate and regular rhythm. Pulmonary/Chest: Effort normal. No stridor. No respiratory distress, no wheezes. Abdominal:  No abnormal distension. Neurological: Alert and oriented to person, place, and time. Skin: Skin is warm and dry. Psychiatric: Normal mood and affect.  Behavior is normal. Thought content normal.    EMANUEL Mchugh DO    8/3/20  1:52 PM

## 2020-08-24 ENCOUNTER — OFFICE VISIT (OUTPATIENT)
Dept: ORTHOPEDIC SURGERY | Age: 55
End: 2020-08-24

## 2020-08-24 VITALS — BODY MASS INDEX: 30.05 KG/M2 | WEIGHT: 187 LBS | HEIGHT: 66 IN | TEMPERATURE: 98 F

## 2020-08-24 PROCEDURE — 99024 POSTOP FOLLOW-UP VISIT: CPT | Performed by: ORTHOPAEDIC SURGERY

## 2020-08-24 NOTE — PROGRESS NOTES
Chief Complaint:   Chief Complaint   Patient presents with    Knee Pain     Left Knee Arthroscopy DOS 6/11/2020       Loida Self is 74 days postop left knee arthroscopic chondroplasty and medial meniscectomy. She is doing fairly well with the knee. She is going to physical therapy. She still has some discomfort and physical therapy is apparently recommend she continue to come. She is also having some more acute right knee pain last few weeks. There is no history of injury here. Allergies; medications; past medical, surgical, family, and social history; and problem list have been reviewed today and updated as indicated in this encounter seen below. Exam: The left knee looks good the wounds are healed. There is mild edema around the knee. Her calf is supple with no pain. Range of motion is good with some crepitus. It is not hot or red. The right knee has minimal effusion. There is no discoloration or increase in local temperature. There is some patellofemoral crepitus which is fine throughout the range of motion of about 0 110 degrees. She has little bit of medial lateral laxity which is felt to be anatomic variant for her. Radiographs: None    ASSESSMENT:    [facility] was seen today for knee pain. Diagnoses and all orders for this visit:    Chondromalacia, left knee    Complex tear of medial meniscus of left knee, initial encounter    Synovitis of left knee    Acute pain of right knee        PLAN: Continue physical therapy. Continue home exercises well. Use other topical or symptomatic treatment as found to be helpful. We will follow-up in about 3 weeks. Return in about 3 weeks (around 9/14/2020).        Current Outpatient Medications   Medication Sig Dispense Refill    vitamin E 1000 units capsule Take 1,000 Units by mouth daily      budesonide-formoterol (SYMBICORT) 160-4.5 MCG/ACT AERO Inhale 2 puffs into the lungs 2 times daily 1 Inhaler 0    albuterol sulfate  (90 Base) MCG/ACT inhaler Inhale 2 puffs into the lungs 4 times daily as needed for Wheezing 1 Inhaler 0    diazepam (VALIUM) 5 MG tablet Take 5 mg by mouth nightly as needed for Anxiety.  PREBIOTIC PRODUCT PO Take by mouth daily       Probiotic Product (PROBIOTIC DAILY PO) Take by mouth daily       vitamin D3 (CHOLECALCIFEROL) 5000 units TABS tablet Take 1 tablet by mouth daily (Patient taking differently: Take 5,000 Units by mouth daily ) 90 tablet 1    traZODone (DESYREL) 100 MG tablet Take 100 mg by mouth nightly       Omega-3 Fatty Acids (FISH OIL) 1200 MG CPDR Take 1 tablet by mouth 2 times daily 2 caps  Stopped 5 days pre op      venlafaxine (EFFEXOR XR) 150 MG extended release capsule Take 150 mg by mouth nightly Takes 2 tabs      busPIRone (BUSPAR) 30 MG tablet Take 30 mg by mouth 2 times daily Takes only 10 mg for PM dose      EPINEPHrine (EPIPEN 2-YURIY) 0.3 MG/0.3ML SOAJ injection Inject 0.3 mLs into the muscle once for 1 dose Use as directed for allergic reaction 0.3 mL 0     No current facility-administered medications for this visit.         Patient Active Problem List   Diagnosis    Vitamin D deficiency    Dyslipidemia    Mild intermittent asthma without complication    Bee allergy status    Chronic pain syndrome    Lumbar facet arthropathy    Lumbar disc disorder    Lumbar spondylosis    JOSÉ MIGUEL (obstructive sleep apnea)    Esophagitis    Derangement of posterior horn of medial meniscus due to old tear or injury, left knee    Chondromalacia, left knee    Synovitis of left knee       Past Medical History:   Diagnosis Date    Anxiety     Asthma     Chávez's esophagus     Depression     GERD (gastroesophageal reflux disease)     Sleep apnea     uses cpap    Thyroid disease     newly dx hypothyroid  (no meds ordered yet)       Past Surgical History:   Procedure Laterality Date    ANKLE SURGERY Left     APPENDECTOMY  1986    BREAST SURGERY      bx x2 left neg    CHOLECYSTECTOMY  1993    COLONOSCOPY      ENDOSCOPY, COLON, DIAGNOSTIC      HAND SURGERY Left     HYSTERECTOMY      KNEE ARTHROSCOPY Left 06/11/2020    Arthroscopic Exam and Treatment of left knee    KNEE ARTHROSCOPY Left 6/11/2020    ARTHROSCOPIC EXAM AND TREATMENT LEFT KNEE, MEDIAL MENISECTOMY, CHONDROPASTY AND SYNOVECTOMY performed by Morgan Neville DO at [address] [facility] Right        Allergies   Allergen Reactions    Sulfa Antibiotics Itching    Vancomycin Itching    Penicillins Rash       Social History     Socioeconomic History    Marital status:      Spouse name: None    Number of children: 2    Years of education: None    Highest education level: None   Occupational History     Employer: [employer]    Social Needs    Financial resource strain: None    Food insecurity     Worry: None     Inability: None    Transportation needs     Medical: None     Non-medical: None   Tobacco Use    Smoking status: Never Smoker    Smokeless tobacco: Never Used   Substance and Sexual Activity    Alcohol use: Yes     Comment: occas    Drug use: No    Sexual activity: None   Lifestyle    Physical activity     Days per week: None     Minutes per session: None    Stress: None   Relationships    Social connections     Talks on phone: None     Gets together: None     Attends Moravian service: None     Active member of club or organization: None     Attends meetings of clubs or organizations: None     Relationship status: None    Intimate partner violence     Fear of current or ex partner: None     Emotionally abused: None     Physically abused: None     Forced sexual activity: None   Other Topics Concern    None   Social History Narrative    None       Review of Systems  As follows except as previously noted in HPI:  Constitutional: Negative for chills, diaphoresis, fatigue, fever and unexpected weight change.    Respiratory: Negative for cough, shortness of

## 2020-09-05 ENCOUNTER — HOSPITAL ENCOUNTER (OUTPATIENT)
Age: 55
Discharge: HOME | End: 2020-09-05
Payer: COMMERCIAL

## 2020-09-05 PROCEDURE — 87186 SC STD MICRODIL/AGAR DIL: CPT

## 2020-09-05 PROCEDURE — 87088 URINE BACTERIA CULTURE: CPT

## 2020-09-07 LAB
ORGANISM: ABNORMAL
URINE CULTURE, ROUTINE: ABNORMAL

## 2020-09-09 ENCOUNTER — OFFICE VISIT (OUTPATIENT)
Dept: ORTHOPEDIC SURGERY | Age: 55
End: 2020-09-09
Payer: COMMERCIAL

## 2020-09-09 VITALS — BODY MASS INDEX: 29.03 KG/M2 | HEIGHT: 67 IN | WEIGHT: 185 LBS

## 2020-09-09 PROCEDURE — 1036F TOBACCO NON-USER: CPT | Performed by: ORTHOPAEDIC SURGERY

## 2020-09-09 PROCEDURE — G8417 CALC BMI ABV UP PARAM F/U: HCPCS | Performed by: ORTHOPAEDIC SURGERY

## 2020-09-09 PROCEDURE — 99213 OFFICE O/P EST LOW 20 MIN: CPT | Performed by: ORTHOPAEDIC SURGERY

## 2020-09-09 PROCEDURE — 3017F COLORECTAL CA SCREEN DOC REV: CPT | Performed by: ORTHOPAEDIC SURGERY

## 2020-09-09 PROCEDURE — G8427 DOCREV CUR MEDS BY ELIG CLIN: HCPCS | Performed by: ORTHOPAEDIC SURGERY

## 2020-09-09 NOTE — PROGRESS NOTES
pain.    Diagnoses and all orders for this visit:    Chondromalacia, right knee    Synovitis of right knee        PLAN: We discussed the obvious pathology. There is also a high level of suspicion for medial meniscal pathology in the right knee. Recommended is MRI of the right knee will seek approval for this and proceed accordingly. No follow-ups on file. Current Outpatient Medications   Medication Sig Dispense Refill    vitamin E 1000 units capsule Take 1,000 Units by mouth daily      budesonide-formoterol (SYMBICORT) 160-4.5 MCG/ACT AERO Inhale 2 puffs into the lungs 2 times daily 1 Inhaler 0    albuterol sulfate  (90 Base) MCG/ACT inhaler Inhale 2 puffs into the lungs 4 times daily as needed for Wheezing 1 Inhaler 0    diazepam (VALIUM) 5 MG tablet Take 5 mg by mouth nightly as needed for Anxiety.  PREBIOTIC PRODUCT PO Take by mouth daily       Probiotic Product (PROBIOTIC DAILY PO) Take by mouth daily       vitamin D3 (CHOLECALCIFEROL) 5000 units TABS tablet Take 1 tablet by mouth daily (Patient taking differently: Take 5,000 Units by mouth daily ) 90 tablet 1    traZODone (DESYREL) 100 MG tablet Take 100 mg by mouth nightly       Omega-3 Fatty Acids (FISH OIL) 1200 MG CPDR Take 1 tablet by mouth 2 times daily 2 caps  Stopped 5 days pre op      venlafaxine (EFFEXOR XR) 150 MG extended release capsule Take 150 mg by mouth nightly Takes 2 tabs      busPIRone (BUSPAR) 30 MG tablet Take 30 mg by mouth 2 times daily Takes only 10 mg for PM dose      EPINEPHrine (EPIPEN 2-YURIY) 0.3 MG/0.3ML SOAJ injection Inject 0.3 mLs into the muscle once for 1 dose Use as directed for allergic reaction 0.3 mL 0     No current facility-administered medications for this visit.         Patient Active Problem List   Diagnosis    Vitamin D deficiency    Dyslipidemia    Mild intermittent asthma without complication    Bee allergy status    Chronic pain syndrome    Lumbar facet arthropathy    Lumbar disc disorder    Lumbar spondylosis    JOSÉ MIGUEL (obstructive sleep apnea)    Esophagitis    Derangement of posterior horn of medial meniscus due to old tear or injury, left knee    Chondromalacia, left knee    Synovitis of left knee       Past Medical History:   Diagnosis Date    Anxiety     Asthma     Chávez's esophagus     Depression     GERD (gastroesophageal reflux disease)     Sleep apnea     uses cpap    Thyroid disease     newly dx hypothyroid  (no meds ordered yet)       Past Surgical History:   Procedure Laterality Date    ANKLE SURGERY Left     APPENDECTOMY  1986    BREAST SURGERY      bx x2 left neg    CHOLECYSTECTOMY  1993    COLONOSCOPY      ENDOSCOPY, COLON, DIAGNOSTIC      HAND SURGERY Left     HYSTERECTOMY      KNEE ARTHROSCOPY Left 06/11/2020    Arthroscopic Exam and Treatment of left knee    KNEE ARTHROSCOPY Left 6/11/2020    ARTHROSCOPIC EXAM AND TREATMENT LEFT KNEE, MEDIAL MENISECTOMY, CHONDROPASTY AND SYNOVECTOMY performed by Carissa Jimenez DO at [address] Right        Allergies   Allergen Reactions    Sulfa Antibiotics Itching    Vancomycin Itching    Penicillins Rash       Social History     Socioeconomic History    Marital status:      Spouse name: None    Number of children: 2    Years of education: None    Highest education level: None   Occupational History     Employer: [employer] [address]   Social Needs    Financial resource strain: None    Food insecurity     Worry: None     Inability: None    Transportation needs     Medical: None     Non-medical: None   Tobacco Use    Smoking status: Never Smoker    Smokeless tobacco: Never Used   Substance and Sexual Activity    Alcohol use: Yes     Comment: occas    Drug use: No    Sexual activity: None   Lifestyle    Physical activity     Days per week: None     Minutes per session: None    Stress: None   Relationships    Social connections     Talks on phone: None Gets together: None     Attends Mandaen service: None     Active member of club or organization: None     Attends meetings of clubs or organizations: None     Relationship status: None    Intimate partner violence     Fear of current or ex partner: None     Emotionally abused: None     Physically abused: None     Forced sexual activity: None   Other Topics Concern    None   Social History Narrative    None       Review of Systems  As follows except as previously noted in HPI:  Constitutional: Negative for chills, diaphoresis, fatigue, fever and unexpected weight change. Respiratory: Negative for cough, shortness of breath and wheezing. Cardiovascular: Negative for chest pain and palpitations. Neurological: Negative for dizziness, syncope, cephalgia. GI / : negative  Musculoskeletal: see HPI       Objective:   Physical Exam   Constitutional: Oriented to person, place, and time. and appears well-developed and well-nourished. :   Head: Normocephalic and atraumatic. Eyes: EOM are normal.   Neck: Neck supple. Cardiovascular: Normal rate and regular rhythm. Pulmonary/Chest: Effort normal. No stridor. No respiratory distress, no wheezes. Abdominal:  No abnormal distension. Neurological: Alert and oriented to person, place, and time. Skin: Skin is warm and dry. Psychiatric: Normal mood and affect.  Behavior is normal. Thought content normal.    K Elbert Klinefelter, DO    9/9/20  8:51 AM

## 2020-09-14 ENCOUNTER — OFFICE VISIT (OUTPATIENT)
Dept: ORTHOPEDIC SURGERY | Age: 55
End: 2020-09-14
Payer: COMMERCIAL

## 2020-09-14 PROCEDURE — G8417 CALC BMI ABV UP PARAM F/U: HCPCS | Performed by: ORTHOPAEDIC SURGERY

## 2020-09-14 PROCEDURE — 99213 OFFICE O/P EST LOW 20 MIN: CPT | Performed by: ORTHOPAEDIC SURGERY

## 2020-09-14 PROCEDURE — 3017F COLORECTAL CA SCREEN DOC REV: CPT | Performed by: ORTHOPAEDIC SURGERY

## 2020-09-14 PROCEDURE — G8427 DOCREV CUR MEDS BY ELIG CLIN: HCPCS | Performed by: ORTHOPAEDIC SURGERY

## 2020-09-14 PROCEDURE — 1036F TOBACCO NON-USER: CPT | Performed by: ORTHOPAEDIC SURGERY

## 2020-09-14 RX ORDER — FAMOTIDINE 40 MG/1
40 TABLET, FILM COATED ORAL DAILY
COMMUNITY
End: 2021-10-07 | Stop reason: CLARIF

## 2020-09-14 NOTE — PROGRESS NOTES
Chief Complaint:   Chief Complaint   Patient presents with    Knee Pain     L. knee follow up scope       Deanna Marie continues to have problems with her left knee. She has had some pain in the backside. She has difficulty walking and activities of daily living are restricted as well. She was working out with physical therapy and they have told her to give it a rest for about a week. She is not inclined to do this and will have to andujar herself to the necessity. The right knee is also bothersome and she is scheduled in the near future for the MRI of that one. Allergies; medications; past medical, surgical, family, and social history; and problem list have been reviewed today and updated as indicated in this encounter seen below. Exam: The left knee is tender with stress examination. She has some medial lateral laxity in the knee with intact collaterals. Range of motion is 0 to 110 degrees or more flexion. There is discomfort with stress examination more in the posterior aspect of the knee. There is only fine crepitus in the patellofemoral articulation with range of motion. Her calf is supple without tenderness. The knee is not hot red or effused. Radiographs: None    ASSESSMENT:    Bria Akhtar was seen today for knee pain. Diagnoses and all orders for this visit:    Chondromalacia, left knee    Synovitis of left knee    Complex tear of medial meniscus of left knee, initial encounter        PLAN: We discussed rest which is appropriate at this time. She needs to limit her activity voluntarily. Other options would be intra-articular injection which would help decrease discomfort temporarily. She sees this as a problem and that she would probably increase her activity if she felt better. She is also asking Wili questions about what potential treatments there would be in the future at her young age and with her desired activity level.   We will have her rest now as physical therapy recommended. Will review the MRI with her after she gets that done and talk with her further about this left knee. No follow-ups on file. Current Outpatient Medications   Medication Sig Dispense Refill    linaCLOtide (LINZESS PO) Take by mouth      famotidine (PEPCID) 40 MG tablet Take 40 mg by mouth daily      vitamin E 1000 units capsule Take 1,000 Units by mouth daily      budesonide-formoterol (SYMBICORT) 160-4.5 MCG/ACT AERO Inhale 2 puffs into the lungs 2 times daily 1 Inhaler 0    albuterol sulfate  (90 Base) MCG/ACT inhaler Inhale 2 puffs into the lungs 4 times daily as needed for Wheezing 1 Inhaler 0    diazepam (VALIUM) 5 MG tablet Take 5 mg by mouth nightly as needed for Anxiety.  PREBIOTIC PRODUCT PO Take by mouth daily       Probiotic Product (PROBIOTIC DAILY PO) Take by mouth daily       vitamin D3 (CHOLECALCIFEROL) 5000 units TABS tablet Take 1 tablet by mouth daily (Patient taking differently: Take 5,000 Units by mouth daily ) 90 tablet 1    traZODone (DESYREL) 100 MG tablet Take 100 mg by mouth nightly       Omega-3 Fatty Acids (FISH OIL) 1200 MG CPDR Take 1 tablet by mouth 2 times daily 2 caps  Stopped 5 days pre op      venlafaxine (EFFEXOR XR) 150 MG extended release capsule Take 150 mg by mouth nightly Takes 2 tabs      busPIRone (BUSPAR) 30 MG tablet Take 30 mg by mouth 2 times daily Takes only 10 mg for PM dose      EPINEPHrine (EPIPEN 2-YURIY) 0.3 MG/0.3ML SOAJ injection Inject 0.3 mLs into the muscle once for 1 dose Use as directed for allergic reaction 0.3 mL 0     No current facility-administered medications for this visit.         Patient Active Problem List   Diagnosis    Vitamin D deficiency    Dyslipidemia    Mild intermittent asthma without complication    Bee allergy status    Chronic pain syndrome    Lumbar facet arthropathy    Lumbar disc disorder    Lumbar spondylosis    JOSÉ MIGUEL (obstructive sleep apnea)    Esophagitis    Derangement of posterior horn of medial meniscus due to old tear or injury, left knee    Chondromalacia, left knee    Synovitis of left knee       Past Medical History:   Diagnosis Date    Anxiety     Asthma     Chávez's esophagus     Depression     GERD (gastroesophageal reflux disease)     Sleep apnea     uses cpap    Thyroid disease     newly dx hypothyroid  (no meds ordered yet)       Past Surgical History:   Procedure Laterality Date    ANKLE SURGERY Left     APPENDECTOMY  1986    BREAST SURGERY      bx x2 left neg    CHOLECYSTECTOMY  1993    COLONOSCOPY      ENDOSCOPY, COLON, DIAGNOSTIC      HAND SURGERY Left     HYSTERECTOMY      KNEE ARTHROSCOPY Left 06/11/2020    Arthroscopic Exam and Treatment of left knee    KNEE ARTHROSCOPY Left 6/11/2020    ARTHROSCOPIC EXAM AND TREATMENT LEFT KNEE, MEDIAL MENISECTOMY, CHONDROPASTY AND SYNOVECTOMY performed by Samantha Joel DO at [address] Right        Allergies   Allergen Reactions    Sulfa Antibiotics Itching    Vancomycin Itching    Penicillins Rash       Social History     Socioeconomic History    Marital status:      Spouse name: None    Number of children: 2    Years of education: None    Highest education level: None   Occupational History     Employer: [employer]    Social Needs    Financial resource strain: None    Food insecurity     Worry: None     Inability: None    Transportation needs     Medical: None     Non-medical: None   Tobacco Use    Smoking status: Never Smoker    Smokeless tobacco: Never Used   Substance and Sexual Activity    Alcohol use: Yes     Comment: occas    Drug use: No    Sexual activity: None   Lifestyle    Physical activity     Days per week: None     Minutes per session: None    Stress: None   Relationships    Social connections     Talks on phone: None     Gets together: None     Attends Bahai service: None     Active member of club or organization: None Attends meetings of clubs or organizations: None     Relationship status: None    Intimate partner violence     Fear of current or ex partner: None     Emotionally abused: None     Physically abused: None     Forced sexual activity: None   Other Topics Concern    None   Social History Narrative    None       Review of Systems  As follows except as previously noted in HPI:  Constitutional: Negative for chills, diaphoresis, fatigue, fever and unexpected weight change. Respiratory: Negative for cough, shortness of breath and wheezing. Cardiovascular: Negative for chest pain and palpitations. Neurological: Negative for dizziness, syncope, cephalgia. GI / : negative  Musculoskeletal: see HPI       Objective:   Physical Exam   Constitutional: Oriented to person, place, and time. and appears well-developed and well-nourished. :   Head: Normocephalic and atraumatic. Eyes: EOM are normal.   Neck: Neck supple. Cardiovascular: Normal rate and regular rhythm. Pulmonary/Chest: Effort normal. No stridor. No respiratory distress, no wheezes. Abdominal:  No abnormal distension. Neurological: Alert and oriented to person, place, and time. Skin: Skin is warm and dry. Psychiatric: Normal mood and affect.  Behavior is normal. Thought content normal.    K Garnet Eisenmenger,     9/14/20  11:50 AM

## 2020-09-21 ENCOUNTER — OFFICE VISIT (OUTPATIENT)
Dept: ORTHOPEDIC SURGERY | Age: 55
End: 2020-09-21
Payer: COMMERCIAL

## 2020-09-21 VITALS — HEIGHT: 67 IN | BODY MASS INDEX: 29.03 KG/M2 | TEMPERATURE: 98 F | WEIGHT: 185 LBS

## 2020-09-21 PROCEDURE — 99213 OFFICE O/P EST LOW 20 MIN: CPT | Performed by: ORTHOPAEDIC SURGERY

## 2020-09-21 PROCEDURE — G8417 CALC BMI ABV UP PARAM F/U: HCPCS | Performed by: ORTHOPAEDIC SURGERY

## 2020-09-21 PROCEDURE — G8427 DOCREV CUR MEDS BY ELIG CLIN: HCPCS | Performed by: ORTHOPAEDIC SURGERY

## 2020-09-21 PROCEDURE — 3017F COLORECTAL CA SCREEN DOC REV: CPT | Performed by: ORTHOPAEDIC SURGERY

## 2020-09-21 PROCEDURE — 1036F TOBACCO NON-USER: CPT | Performed by: ORTHOPAEDIC SURGERY

## 2020-09-21 NOTE — PROGRESS NOTES
Chief Complaint:   Chief Complaint   Patient presents with    Knee Pain     Right Knee, MRI F/U       Peg Gillette continues to complain of right knee pain. She had her MRI. She is in for review of that. She relates her history once again of the discomfort and difficulty with activities of daily living. She has decreased her activities which is decreased her knee pain but as soon as she starts doing more they resume prior intensity. She does not see this as a way she chooses to continue with her life. She is interested in a solution      Allergies; medications; past medical, surgical, family, and social history; and problem list have been reviewed today and updated as indicated in this encounter seen below. Exam:  Skin condition gross neurovascular function is good in the right lower extremity. Gait is fairly smooth and balanced on flat surface. There is slight varus malalignment of both knees. Single-leg stance is possible on each side. Range of motion of the right knee is 0 to 110 degrees or more flexion. She has some tenderness palpation along the medial joint line. There is some medial gapping with stress examination and some painful reaction with this as well as with Ferdinand testing for meniscus pathology. Cruciate collateral ligaments are grossly     Radiographs: MRI right knee 9/18/2020 shows a cyst in the posterior aspect of the distal femur which appears to be benign as well demarcated. There is no cortical involvement. There is effusion in the right knee. There is degeneration in the medial meniscus and truncation towards the posterior horn area. There is narrowing of the medial joint compartment there is irregularity of the patellofemoral articular cartilage. There is also some edema in the proximal tibia deep to the attachment of the cruciate ligaments. ASSESSMENT:    [employer] was seen today for knee pain.     Diagnoses and all orders for this visit:    Synovitis of right knee    Chondromalacia, right knee    Bone cyst of right femur        PLAN: We discussed the findings. Treatment options include oral medications and support, intra-articular injection to decrease symptoms and arthroscopic exam and treatment if this can be approved through her insurance company. We will seek approval and proceed accordingly. No follow-ups on file. Current Outpatient Medications   Medication Sig Dispense Refill    linaCLOtide (LINZESS PO) Take by mouth      famotidine (PEPCID) 40 MG tablet Take 40 mg by mouth daily      vitamin E 1000 units capsule Take 1,000 Units by mouth daily      budesonide-formoterol (SYMBICORT) 160-4.5 MCG/ACT AERO Inhale 2 puffs into the lungs 2 times daily 1 Inhaler 0    albuterol sulfate  (90 Base) MCG/ACT inhaler Inhale 2 puffs into the lungs 4 times daily as needed for Wheezing 1 Inhaler 0    diazepam (VALIUM) 5 MG tablet Take 5 mg by mouth nightly as needed for Anxiety.  PREBIOTIC PRODUCT PO Take by mouth daily       Probiotic Product (PROBIOTIC DAILY PO) Take by mouth daily       vitamin D3 (CHOLECALCIFEROL) 5000 units TABS tablet Take 1 tablet by mouth daily (Patient taking differently: Take 5,000 Units by mouth daily ) 90 tablet 1    traZODone (DESYREL) 100 MG tablet Take 100 mg by mouth nightly       Omega-3 Fatty Acids (FISH OIL) 1200 MG CPDR Take 1 tablet by mouth 2 times daily 2 caps  Stopped 5 days pre op      venlafaxine (EFFEXOR XR) 150 MG extended release capsule Take 150 mg by mouth nightly Takes 2 tabs      busPIRone (BUSPAR) 30 MG tablet Take 30 mg by mouth 2 times daily Takes only 10 mg for PM dose      EPINEPHrine (EPIPEN 2-YURIY) 0.3 MG/0.3ML SOAJ injection Inject 0.3 mLs into the muscle once for 1 dose Use as directed for allergic reaction 0.3 mL 0     No current facility-administered medications for this visit.         Patient Active Problem List   Diagnosis    Vitamin D deficiency    Dyslipidemia    Mild per week: None     Minutes per session: None    Stress: None   Relationships    Social connections     Talks on phone: None     Gets together: None     Attends Voodoo service: None     Active member of club or organization: None     Attends meetings of clubs or organizations: None     Relationship status: None    Intimate partner violence     Fear of current or ex partner: None     Emotionally abused: None     Physically abused: None     Forced sexual activity: None   Other Topics Concern    None   Social History Narrative    None       Review of Systems  As follows except as previously noted in HPI:  Constitutional: Negative for chills, diaphoresis, fatigue, fever and unexpected weight change. Respiratory: Negative for cough, shortness of breath and wheezing. Cardiovascular: Negative for chest pain and palpitations. Neurological: Negative for dizziness, syncope, cephalgia. GI / : negative  Musculoskeletal: see HPI       Objective:   Physical Exam   Constitutional: Oriented to person, place, and time. and appears well-developed and well-nourished. :   Head: Normocephalic and atraumatic. Eyes: EOM are normal.   Neck: Neck supple. Cardiovascular: Normal rate and regular rhythm. Pulmonary/Chest: Effort normal. No stridor. No respiratory distress, no wheezes. Abdominal:  No abnormal distension. Neurological: Alert and oriented to person, place, and time. Skin: Skin is warm and dry. Psychiatric: Normal mood and affect.  Behavior is normal. Thought content normal.    EMANUEL Groves DO    9/21/20  10:05 AM

## 2020-09-29 ENCOUNTER — HOSPITAL ENCOUNTER (OUTPATIENT)
Age: 55
Discharge: HOME | End: 2020-10-01
Payer: COMMERCIAL

## 2020-09-29 LAB
BASOPHILS ABSOLUTE: 0.03 E9/L (ref 0–0.2)
BASOPHILS RELATIVE PERCENT: 0.7 % (ref 0–2)
EOSINOPHILS ABSOLUTE: 0.08 E9/L (ref 0.05–0.5)
EOSINOPHILS RELATIVE PERCENT: 1.8 % (ref 0–6)
HCT VFR BLD CALC: 41.5 % (ref 34–48)
HEMOGLOBIN: 13.5 G/DL (ref 11.5–15.5)
IMMATURE GRANULOCYTES #: 0.01 E9/L
IMMATURE GRANULOCYTES %: 0.2 % (ref 0–5)
LYMPHOCYTES ABSOLUTE: 1.84 E9/L (ref 1.5–4)
LYMPHOCYTES RELATIVE PERCENT: 41.2 % (ref 20–42)
MCH RBC QN AUTO: 29.6 PG (ref 26–35)
MCHC RBC AUTO-ENTMCNC: 32.5 % (ref 32–34.5)
MCV RBC AUTO: 91 FL (ref 80–99.9)
MONOCYTES ABSOLUTE: 0.28 E9/L (ref 0.1–0.95)
MONOCYTES RELATIVE PERCENT: 6.3 % (ref 2–12)
NEUTROPHILS ABSOLUTE: 2.23 E9/L (ref 1.8–7.3)
NEUTROPHILS RELATIVE PERCENT: 49.8 % (ref 43–80)
PDW BLD-RTO: 13.1 FL (ref 11.5–15)
PLATELET # BLD: 193 E9/L (ref 130–450)
PMV BLD AUTO: 11.9 FL (ref 7–12)
RBC # BLD: 4.56 E12/L (ref 3.5–5.5)
WBC # BLD: 4.5 E9/L (ref 4.5–11.5)

## 2020-09-29 PROCEDURE — 84439 ASSAY OF FREE THYROXINE: CPT

## 2020-09-29 PROCEDURE — 80053 COMPREHEN METABOLIC PANEL: CPT

## 2020-09-29 PROCEDURE — 82306 VITAMIN D 25 HYDROXY: CPT

## 2020-09-29 PROCEDURE — 85025 COMPLETE CBC W/AUTO DIFF WBC: CPT

## 2020-09-29 PROCEDURE — 87088 URINE BACTERIA CULTURE: CPT

## 2020-09-29 PROCEDURE — 36415 COLL VENOUS BLD VENIPUNCTURE: CPT

## 2020-09-29 PROCEDURE — 84443 ASSAY THYROID STIM HORMONE: CPT

## 2020-09-29 PROCEDURE — 80061 LIPID PANEL: CPT

## 2020-09-30 LAB
ALBUMIN SERPL-MCNC: 4.4 G/DL (ref 3.5–5.2)
ALP BLD-CCNC: 62 U/L (ref 35–104)
ALT SERPL-CCNC: 21 U/L (ref 0–32)
ANION GAP SERPL CALCULATED.3IONS-SCNC: 16 MMOL/L (ref 7–16)
AST SERPL-CCNC: 23 U/L (ref 0–31)
BILIRUB SERPL-MCNC: 0.3 MG/DL (ref 0–1.2)
BUN BLDV-MCNC: 11 MG/DL (ref 6–20)
CALCIUM SERPL-MCNC: 9.4 MG/DL (ref 8.6–10.2)
CHLORIDE BLD-SCNC: 106 MMOL/L (ref 98–107)
CHOLESTEROL, TOTAL: 214 MG/DL (ref 0–199)
CO2: 20 MMOL/L (ref 22–29)
CREAT SERPL-MCNC: 0.8 MG/DL (ref 0.5–1)
GFR AFRICAN AMERICAN: >60
GFR NON-AFRICAN AMERICAN: >60 ML/MIN/1.73
GLUCOSE BLD-MCNC: 95 MG/DL (ref 74–99)
HDLC SERPL-MCNC: 65 MG/DL
LDL CHOLESTEROL CALCULATED: 133 MG/DL (ref 0–99)
POTASSIUM SERPL-SCNC: 4.2 MMOL/L (ref 3.5–5)
SODIUM BLD-SCNC: 142 MMOL/L (ref 132–146)
T4 FREE: 1.06 NG/DL (ref 0.93–1.7)
TOTAL PROTEIN: 6.9 G/DL (ref 6.4–8.3)
TRIGL SERPL-MCNC: 81 MG/DL (ref 0–149)
TSH SERPL DL<=0.05 MIU/L-ACNC: 3.89 UIU/ML (ref 0.27–4.2)
VITAMIN D 25-HYDROXY: 54 NG/ML (ref 30–100)
VLDLC SERPL CALC-MCNC: 16 MG/DL

## 2020-10-01 LAB — URINE CULTURE, ROUTINE: NORMAL

## 2020-10-05 ENCOUNTER — OFFICE VISIT (OUTPATIENT)
Dept: ORTHOPEDIC SURGERY | Age: 55
End: 2020-10-05
Payer: COMMERCIAL

## 2020-10-05 VITALS — BODY MASS INDEX: 29.03 KG/M2 | TEMPERATURE: 98 F | HEIGHT: 67 IN | WEIGHT: 185 LBS

## 2020-10-05 PROCEDURE — 20610 DRAIN/INJ JOINT/BURSA W/O US: CPT | Performed by: ORTHOPAEDIC SURGERY

## 2020-10-05 PROCEDURE — 3017F COLORECTAL CA SCREEN DOC REV: CPT | Performed by: ORTHOPAEDIC SURGERY

## 2020-10-05 PROCEDURE — 1036F TOBACCO NON-USER: CPT | Performed by: ORTHOPAEDIC SURGERY

## 2020-10-05 PROCEDURE — G8427 DOCREV CUR MEDS BY ELIG CLIN: HCPCS | Performed by: ORTHOPAEDIC SURGERY

## 2020-10-05 PROCEDURE — G8417 CALC BMI ABV UP PARAM F/U: HCPCS | Performed by: ORTHOPAEDIC SURGERY

## 2020-10-05 PROCEDURE — 99213 OFFICE O/P EST LOW 20 MIN: CPT | Performed by: ORTHOPAEDIC SURGERY

## 2020-10-05 PROCEDURE — G8484 FLU IMMUNIZE NO ADMIN: HCPCS | Performed by: ORTHOPAEDIC SURGERY

## 2020-10-05 RX ORDER — TRIAMCINOLONE ACETONIDE 40 MG/ML
40 INJECTION, SUSPENSION INTRA-ARTICULAR; INTRAMUSCULAR ONCE
Status: COMPLETED | OUTPATIENT
Start: 2020-10-05 | End: 2020-10-05

## 2020-10-05 RX ADMIN — TRIAMCINOLONE ACETONIDE 40 MG: 40 INJECTION, SUSPENSION INTRA-ARTICULAR; INTRAMUSCULAR at 09:55

## 2020-10-05 RX ADMIN — TRIAMCINOLONE ACETONIDE 40 MG: 40 INJECTION, SUSPENSION INTRA-ARTICULAR; INTRAMUSCULAR at 09:58

## 2020-10-05 NOTE — PROGRESS NOTES
Chief Complaint:   Chief Complaint   Patient presents with    Knee Pain     Bilateral Knee, F/U, wants to discuss cortisone injections       Sydney Rios would like to have injections of both knees. She continues to complain about her activity level which is decreased. She talks about weight gain and she would like to lose some. She would like to resume more normal activities such as grocery shopping and activities of daily living. She also is interested in continuing physical therapy to exhaust her yearly benefit. Allergies; medications; past medical, surgical, family, and social history; and problem list have been reviewed today and updated as indicated in this encounter seen below. Exam: Range of motion of the knees and stability are as before. She has discomfort around the knees to palpation and with examination. There is no indication of acute inflammation or infection or other contraindications to injection. Radiographs: None    ASSESSMENT:    Dorian Greene was seen today for knee pain. Diagnoses and all orders for this visit:    Synovitis of right knee    Chondromalacia, right knee    Chondromalacia, left knee    Synovitis of left knee    Complex tear of medial meniscus of left knee, initial encounter    Chronic pain of left knee    Acute pain of right knee    Derangement of posterior horn of medial meniscus due to old tear or injury, left knee        PLAN: injection of the right knee with 1/4  % bupivicaine 4 ml and Depo Medrol    (methyl prednisolone)   40mgwas discussed with the patient. Discussion included but was not limited to potential risks and benefits. No contraindications to the procedure were noted. Understanding and agreement was indicated. The procedure was accomplished without incident using appropriate aseptic technique. injection of the left knee with 1/4  % bupivicaine 4 ml and Depo Medrol    (methyl prednisolone)   40mgwas discussed with the patient. Discussion included but was not limited to potential risks and benefits. No contraindications to the procedure were noted. Understanding and agreement was indicated. The procedure was accomplished without incident using appropriate aseptic technique. follow up as scheduled    Return in about 4 weeks (around 11/2/2020). Current Outpatient Medications   Medication Sig Dispense Refill    linaCLOtide (LINZESS PO) Take by mouth      famotidine (PEPCID) 40 MG tablet Take 40 mg by mouth daily      vitamin E 1000 units capsule Take 1,000 Units by mouth daily      budesonide-formoterol (SYMBICORT) 160-4.5 MCG/ACT AERO Inhale 2 puffs into the lungs 2 times daily 1 Inhaler 0    albuterol sulfate  (90 Base) MCG/ACT inhaler Inhale 2 puffs into the lungs 4 times daily as needed for Wheezing 1 Inhaler 0    diazepam (VALIUM) 5 MG tablet Take 5 mg by mouth nightly as needed for Anxiety.  PREBIOTIC PRODUCT PO Take by mouth daily       Probiotic Product (PROBIOTIC DAILY PO) Take by mouth daily       vitamin D3 (CHOLECALCIFEROL) 5000 units TABS tablet Take 1 tablet by mouth daily (Patient taking differently: Take 5,000 Units by mouth daily ) 90 tablet 1    traZODone (DESYREL) 100 MG tablet Take 100 mg by mouth nightly       Omega-3 Fatty Acids (FISH OIL) 1200 MG CPDR Take 1 tablet by mouth 2 times daily 2 caps  Stopped 5 days pre op      venlafaxine (EFFEXOR XR) 150 MG extended release capsule Take 150 mg by mouth nightly Takes 2 tabs      busPIRone (BUSPAR) 30 MG tablet Take 30 mg by mouth 2 times daily Takes only 10 mg for PM dose      EPINEPHrine (EPIPEN 2-YURIY) 0.3 MG/0.3ML SOAJ injection Inject 0.3 mLs into the muscle once for 1 dose Use as directed for allergic reaction 0.3 mL 0     No current facility-administered medications for this visit.         Patient Active Problem List   Diagnosis    Vitamin D deficiency    Dyslipidemia    Mild intermittent asthma without complication    Bee allergy status    Chronic pain syndrome    Lumbar facet arthropathy    Lumbar disc disorder    Lumbar spondylosis    JOSÉ MIGUEL (obstructive sleep apnea)    Esophagitis    Derangement of posterior horn of medial meniscus due to old tear or injury, left knee    Chondromalacia, left knee    Synovitis of left knee       Past Medical History:   Diagnosis Date    Anxiety     Asthma     Chávez's esophagus     Depression     GERD (gastroesophageal reflux disease)     Sleep apnea     uses cpap    Thyroid disease     newly dx hypothyroid  (no meds ordered yet)       Past Surgical History:   Procedure Laterality Date    ANKLE SURGERY Left     APPENDECTOMY  1986    BREAST SURGERY      bx x2 left neg    CHOLECYSTECTOMY  1993    COLONOSCOPY      ENDOSCOPY, COLON, DIAGNOSTIC      HAND SURGERY Left     HYSTERECTOMY      KNEE ARTHROSCOPY Left 06/11/2020    Arthroscopic Exam and Treatment of left knee    KNEE ARTHROSCOPY Left 6/11/2020    ARTHROSCOPIC EXAM AND TREATMENT LEFT KNEE, MEDIAL MENISECTOMY, CHONDROPASTY AND SYNOVECTOMY performed by Lupillo Saeed DO at [address] Right        Allergies   Allergen Reactions    Sulfa Antibiotics Itching    Vancomycin Itching    Penicillins Rash       Social History     Socioeconomic History    Marital status:      Spouse name: None    Number of children: 2    Years of education: None    Highest education level: None   Occupational History     Employer: [address] [place] [address] [employer]   Social Needs    Financial resource strain: None    Food insecurity     Worry: None     Inability: None    Transportation needs     Medical: None     Non-medical: None   Tobacco Use    Smoking status: Never Smoker    Smokeless tobacco: Never Used   Substance and Sexual Activity    Alcohol use: Yes     Comment: occas    Drug use: No    Sexual activity: None   Lifestyle    Physical activity     Days per week: None     Minutes per session: None   

## 2020-11-02 ENCOUNTER — OFFICE VISIT (OUTPATIENT)
Dept: ORTHOPEDIC SURGERY | Age: 55
End: 2020-11-02
Payer: COMMERCIAL

## 2020-11-02 VITALS — TEMPERATURE: 98 F | WEIGHT: 197 LBS | BODY MASS INDEX: 30.92 KG/M2 | HEIGHT: 67 IN

## 2020-11-02 PROCEDURE — 1036F TOBACCO NON-USER: CPT | Performed by: ORTHOPAEDIC SURGERY

## 2020-11-02 PROCEDURE — G8417 CALC BMI ABV UP PARAM F/U: HCPCS | Performed by: ORTHOPAEDIC SURGERY

## 2020-11-02 PROCEDURE — G8427 DOCREV CUR MEDS BY ELIG CLIN: HCPCS | Performed by: ORTHOPAEDIC SURGERY

## 2020-11-02 PROCEDURE — G8484 FLU IMMUNIZE NO ADMIN: HCPCS | Performed by: ORTHOPAEDIC SURGERY

## 2020-11-02 PROCEDURE — 3017F COLORECTAL CA SCREEN DOC REV: CPT | Performed by: ORTHOPAEDIC SURGERY

## 2020-11-02 PROCEDURE — 99213 OFFICE O/P EST LOW 20 MIN: CPT | Performed by: ORTHOPAEDIC SURGERY

## 2020-11-02 NOTE — PROGRESS NOTES
Chief Complaint:   Chief Complaint   Patient presents with    Knee Pain     Bilateral Knee, F/U had cortisone injection on 10/05/2020 with only 2 weeks of relief. Is in [facility] follows up for her knee problems. She got about 2 weeks of relief from the knee injections. She is back in physical therapy in [place]. Also working on her feet to try to improve arch issues. She has a few visits left on her yearly limit. Recalls a history of the left knee being scoped in the right showed no evidence of tears which would justify surgical treatment. Allergies; medications; past medical, surgical, family, and social history; and problem list have been reviewed today and updated as indicated in this encounter seen below. Exam: Gait is impaired patient was walking flexed at knees and hips leaning forward somewhat. Her gait is slow. Range of motion of the knees is limited some by discomfort. She has discomfort with stress examination. There is no gross instability. Radiographs: MRI reports were reviewed once again. ASSESSMENT:    Nilo Almazan was seen today for knee pain. Diagnoses and all orders for this visit:    Complex tear of medial meniscus of left knee, initial encounter    Chondromalacia, left knee    Synovitis of right knee    Chondromalacia, right knee    Synovitis of left knee    Derangement of posterior horn of medial meniscus due to old tear or injury, left knee        PLAN: Continue with physical therapy as scheduled. We will follow in about 4 weeks. No follow-ups on file.        Current Outpatient Medications   Medication Sig Dispense Refill    zinc 50 MG TABS tablet Take 50 mg by mouth daily      NONFORMULARY Take 1 capsule by mouth daily Immune Enhancer      linaCLOtide (LINZESS PO) Take by mouth      famotidine (PEPCID) 40 MG tablet Take 40 mg by mouth daily      albuterol sulfate  (90 Base) MCG/ACT inhaler Inhale 2 puffs into the lungs 4 times daily as needed for Wheezing 1 Inhaler 0    diazepam (VALIUM) 5 MG tablet Take 5 mg by mouth nightly as needed for Anxiety.  PREBIOTIC PRODUCT PO Take by mouth daily       Probiotic Product (PROBIOTIC DAILY PO) Take by mouth daily       vitamin D3 (CHOLECALCIFEROL) 5000 units TABS tablet Take 1 tablet by mouth daily (Patient taking differently: Take 5,000 Units by mouth daily ) 90 tablet 1    traZODone (DESYREL) 100 MG tablet Take 100 mg by mouth nightly       Omega-3 Fatty Acids (FISH OIL) 1200 MG CPDR Take 1 tablet by mouth 2 times daily 2 caps  Stopped 5 days pre op      venlafaxine (EFFEXOR XR) 150 MG extended release capsule Take 150 mg by mouth nightly Takes 2 tabs      busPIRone (BUSPAR) 30 MG tablet Take 30 mg by mouth 2 times daily Takes only 10 mg for PM dose      EPINEPHrine (EPIPEN 2-YURIY) 0.3 MG/0.3ML SOAJ injection Inject 0.3 mLs into the muscle once for 1 dose Use as directed for allergic reaction 0.3 mL 0     No current facility-administered medications for this visit.         Patient Active Problem List   Diagnosis    Vitamin D deficiency    Dyslipidemia    Mild intermittent asthma without complication    Bee allergy status    Chronic pain syndrome    Lumbar facet arthropathy    Lumbar disc disorder    Lumbar spondylosis    JOSÉ MIGUEL (obstructive sleep apnea)    Esophagitis    Derangement of posterior horn of medial meniscus due to old tear or injury, left knee    Chondromalacia, left knee    Synovitis of left knee       Past Medical History:   Diagnosis Date    Anxiety     Asthma     Chávez's esophagus     Depression     GERD (gastroesophageal reflux disease)     Sleep apnea     uses cpap    Thyroid disease     newly dx hypothyroid  (no meds ordered yet)       Past Surgical History:   Procedure Laterality Date    ANKLE SURGERY Left     APPENDECTOMY  1986    BREAST SURGERY      bx x2 left neg    CHOLECYSTECTOMY  1993    COLONOSCOPY      ENDOSCOPY, COLON, DIAGNOSTIC      HAND SURGERY Left     HYSTERECTOMY      KNEE ARTHROSCOPY Left 06/11/2020    Arthroscopic Exam and Treatment of left knee    KNEE ARTHROSCOPY Left 6/11/2020    ARTHROSCOPIC EXAM AND TREATMENT LEFT KNEE, MEDIAL MENISECTOMY, CHONDROPASTY AND SYNOVECTOMY performed by Jennifer Alberto DO at [address] Right        Allergies   Allergen Reactions    Sulfa Antibiotics Itching    Vancomycin Itching    Penicillins Rash       Social History     Socioeconomic History    Marital status:      Spouse name: None    Number of children: 2    Years of education: None    Highest education level: None   Occupational History     Employer: [employer]    Social Needs    Financial resource strain: None    Food insecurity     Worry: None     Inability: None    Transportation needs     Medical: None     Non-medical: None   Tobacco Use    Smoking status: Never Smoker    Smokeless tobacco: Never Used   Substance and Sexual Activity    Alcohol use: Yes     Comment: occas    Drug use: No    Sexual activity: None   Lifestyle    Physical activity     Days per week: None     Minutes per session: None    Stress: None   Relationships    Social connections     Talks on phone: None     Gets together: None     Attends Muslim service: None     Active member of club or organization: None     Attends meetings of clubs or organizations: None     Relationship status: None    Intimate partner violence     Fear of current or ex partner: None     Emotionally abused: None     Physically abused: None     Forced sexual activity: None   Other Topics Concern    None   Social History Narrative    None       Review of Systems  As follows except as previously noted in HPI:  Constitutional: Negative for chills, diaphoresis, fatigue, fever and unexpected weight change. Respiratory: Negative for cough, shortness of breath and wheezing. Cardiovascular: Negative for chest pain and palpitations. Neurological: Negative for dizziness, syncope, cephalgia. GI / : negative  Musculoskeletal: see HPI       Objective:   Physical Exam   Constitutional: Oriented to person, place, and time. and appears well-developed and well-nourished. :   Head: Normocephalic and atraumatic. Eyes: EOM are normal.   Neck: Neck supple. Cardiovascular: Normal rate and regular rhythm. Pulmonary/Chest: Effort normal. No stridor. No respiratory distress, no wheezes. Abdominal:  No abnormal distension. Neurological: Alert and oriented to person, place, and time. Skin: Skin is warm and dry. Psychiatric: Normal mood and affect.  Behavior is normal. Thought content normal.    EMANUEL Zhang DO    11/2/20  10:09 AM

## 2020-11-03 PROBLEM — M47.816 LUMBAR SPONDYLOSIS: Status: RESOLVED | Noted: 2018-08-10 | Resolved: 2020-11-03

## 2020-11-30 ENCOUNTER — OFFICE VISIT (OUTPATIENT)
Dept: ORTHOPEDIC SURGERY | Age: 55
End: 2020-11-30
Payer: COMMERCIAL

## 2020-11-30 VITALS — HEIGHT: 67 IN | TEMPERATURE: 98 F | BODY MASS INDEX: 30.92 KG/M2 | WEIGHT: 197 LBS

## 2020-11-30 PROCEDURE — 99213 OFFICE O/P EST LOW 20 MIN: CPT | Performed by: ORTHOPAEDIC SURGERY

## 2020-11-30 PROCEDURE — G8427 DOCREV CUR MEDS BY ELIG CLIN: HCPCS | Performed by: ORTHOPAEDIC SURGERY

## 2020-11-30 PROCEDURE — 1036F TOBACCO NON-USER: CPT | Performed by: ORTHOPAEDIC SURGERY

## 2020-11-30 PROCEDURE — 3017F COLORECTAL CA SCREEN DOC REV: CPT | Performed by: ORTHOPAEDIC SURGERY

## 2020-11-30 PROCEDURE — G8484 FLU IMMUNIZE NO ADMIN: HCPCS | Performed by: ORTHOPAEDIC SURGERY

## 2020-11-30 PROCEDURE — G8417 CALC BMI ABV UP PARAM F/U: HCPCS | Performed by: ORTHOPAEDIC SURGERY

## 2020-11-30 NOTE — PROGRESS NOTES
Chief Complaint:   Chief Complaint   Patient presents with    Knee Pain     b/l knee pain the pain is staying the same. she fell and landed on both knees PT has helped        [facility] follows up for the knee problems she has had long-term. She went through a second round of physical therapy and her therapist change things up. Focused on some issues with her feet and also with the thigh and hip area. What is suggested by her explanation is that she learned a lot of stabilization techniques. She has made significant improvement and is much happier than she was prior. She still has some discomfort but it is quite manageable for her at this time. She is done with therapy and will continue with her exercises at home      Allergies; medications; past medical, surgical, family, and social history; and problem list have been reviewed today and updated as indicated in this encounter seen below. Exam: Range of motion of the knees is good. There is only slight amount of crepitus. She is not complaining of pain with any motion. There is no effusion. Her calves are supple with no tenderness. There is some relative varus alignment of both knees. Gait seems to be a little smoother without obvious discomfort. Radiographs: None    ASSESSMENT:    Gasper Ariza was seen today for knee pain. Diagnoses and all orders for this visit:    Complex tear of medial meniscus of left knee, initial encounter    Synovitis of right knee    Synovitis of left knee    Chondromalacia, left knee    Chondromalacia, right knee    Derangement of posterior horn of medial meniscus due to old tear or injury, left knee        PLAN: Continue exercises as instructed in physical therapy to maintain motion and stability in the lower extremities in a pain-free low back as well. We will follow-up as needed    No follow-ups on file.        Current Outpatient Medications   Medication Sig Dispense Refill    zinc 50 MG TABS tablet Take 50 mg by mouth daily      NONFORMULARY Take 1 capsule by mouth daily Immune Enhancer      linaCLOtide (LINZESS PO) Take by mouth      famotidine (PEPCID) 40 MG tablet Take 40 mg by mouth daily      albuterol sulfate  (90 Base) MCG/ACT inhaler Inhale 2 puffs into the lungs 4 times daily as needed for Wheezing 1 Inhaler 0    diazepam (VALIUM) 5 MG tablet Take 5 mg by mouth nightly as needed for Anxiety.  PREBIOTIC PRODUCT PO Take by mouth daily       Probiotic Product (PROBIOTIC DAILY PO) Take by mouth daily       vitamin D3 (CHOLECALCIFEROL) 5000 units TABS tablet Take 1 tablet by mouth daily (Patient taking differently: Take 5,000 Units by mouth daily ) 90 tablet 1    traZODone (DESYREL) 100 MG tablet Take 100 mg by mouth nightly       Omega-3 Fatty Acids (FISH OIL) 1200 MG CPDR Take 1 tablet by mouth 2 times daily 2 caps  Stopped 5 days pre op      venlafaxine (EFFEXOR XR) 150 MG extended release capsule Take 150 mg by mouth nightly Takes 2 tabs      busPIRone (BUSPAR) 30 MG tablet Take 30 mg by mouth 2 times daily Takes only 10 mg for PM dose      EPINEPHrine (EPIPEN 2-YURIY) 0.3 MG/0.3ML SOAJ injection Inject 0.3 mLs into the muscle once for 1 dose Use as directed for allergic reaction 0.3 mL 0     No current facility-administered medications for this visit.         Patient Active Problem List   Diagnosis    Vitamin D deficiency    Dyslipidemia    Mild intermittent asthma without complication    Bee allergy status    Chronic pain syndrome    Lumbar facet arthropathy    Lumbar disc disorder    JOSÉ MIGUEL (obstructive sleep apnea)    Esophagitis    Derangement of posterior horn of medial meniscus due to old tear or injury, left knee    Chondromalacia, left knee    Synovitis of left knee       Past Medical History:   Diagnosis Date    Anxiety     Asthma     Chávez's esophagus     Depression     GERD (gastroesophageal reflux disease)     Sleep apnea     uses cpap    Thyroid disease     newly dx hypothyroid  (no meds ordered yet)       Past Surgical History:   Procedure Laterality Date    ANKLE SURGERY Left     APPENDECTOMY  1986    BREAST SURGERY      bx x2 left neg    CHOLECYSTECTOMY  1993    COLONOSCOPY      ENDOSCOPY, COLON, DIAGNOSTIC      HAND SURGERY Left     HYSTERECTOMY      KNEE ARTHROSCOPY Left 06/11/2020    Arthroscopic Exam and Treatment of left knee    KNEE ARTHROSCOPY Left 6/11/2020    ARTHROSCOPIC EXAM AND TREATMENT LEFT KNEE, MEDIAL MENISECTOMY, CHONDROPASTY AND SYNOVECTOMY performed by Echo Arteaga DO at [address] Right        Allergies   Allergen Reactions    Sulfa Antibiotics Itching    Vancomycin Itching    Penicillins Rash       Social History     Socioeconomic History    Marital status:      Spouse name: None    Number of children: 2    Years of education: None    Highest education level: None   Occupational History     Employer: [employer]   Social Needs    Financial resource strain: None    Food insecurity     Worry: None     Inability: None    Transportation needs     Medical: None     Non-medical: None   Tobacco Use    Smoking status: Never Smoker    Smokeless tobacco: Never Used   Substance and Sexual Activity    Alcohol use: Yes     Comment: occas    Drug use: No    Sexual activity: None   Lifestyle    Physical activity     Days per week: None     Minutes per session: None    Stress: None   Relationships    Social connections     Talks on phone: None     Gets together: None     Attends Buddhist service: None     Active member of club or organization: None     Attends meetings of clubs or organizations: None     Relationship status: None    Intimate partner violence     Fear of current or ex partner: None     Emotionally abused: None     Physically abused: None     Forced sexual activity: None   Other Topics Concern    None   Social History Narrative    None       Review of Systems  As

## 2021-03-31 DIAGNOSIS — E55.9 VITAMIN D DEFICIENCY: ICD-10-CM

## 2021-03-31 DIAGNOSIS — E78.5 DYSLIPIDEMIA: ICD-10-CM

## 2021-03-31 DIAGNOSIS — E03.8 SUBCLINICAL HYPOTHYROIDISM: ICD-10-CM

## 2021-03-31 LAB
ALBUMIN SERPL-MCNC: 4.4 G/DL (ref 3.5–5.2)
ALP BLD-CCNC: 59 U/L (ref 35–104)
ALT SERPL-CCNC: 32 U/L (ref 0–32)
ANION GAP SERPL CALCULATED.3IONS-SCNC: 15 MMOL/L (ref 7–16)
AST SERPL-CCNC: 35 U/L (ref 0–31)
BASOPHILS ABSOLUTE: 0.02 E9/L (ref 0–0.2)
BASOPHILS RELATIVE PERCENT: 0.4 % (ref 0–2)
BILIRUB SERPL-MCNC: 0.3 MG/DL (ref 0–1.2)
BUN BLDV-MCNC: 15 MG/DL (ref 6–20)
CALCIUM SERPL-MCNC: 9.4 MG/DL (ref 8.6–10.2)
CHLORIDE BLD-SCNC: 103 MMOL/L (ref 98–107)
CHOLESTEROL, TOTAL: 223 MG/DL (ref 0–199)
CO2: 21 MMOL/L (ref 22–29)
CREAT SERPL-MCNC: 0.8 MG/DL (ref 0.5–1)
EOSINOPHILS ABSOLUTE: 0.14 E9/L (ref 0.05–0.5)
EOSINOPHILS RELATIVE PERCENT: 2.9 % (ref 0–6)
GFR AFRICAN AMERICAN: >60
GFR NON-AFRICAN AMERICAN: >60 ML/MIN/1.73
GLUCOSE BLD-MCNC: 107 MG/DL (ref 74–99)
HCT VFR BLD CALC: 39.3 % (ref 34–48)
HDLC SERPL-MCNC: 63 MG/DL
HEMOGLOBIN: 12.9 G/DL (ref 11.5–15.5)
IMMATURE GRANULOCYTES #: 0.01 E9/L
IMMATURE GRANULOCYTES %: 0.2 % (ref 0–5)
LDL CHOLESTEROL CALCULATED: 136 MG/DL (ref 0–99)
LYMPHOCYTES ABSOLUTE: 1.8 E9/L (ref 1.5–4)
LYMPHOCYTES RELATIVE PERCENT: 37.6 % (ref 20–42)
MCH RBC QN AUTO: 30.2 PG (ref 26–35)
MCHC RBC AUTO-ENTMCNC: 32.8 % (ref 32–34.5)
MCV RBC AUTO: 92 FL (ref 80–99.9)
MONOCYTES ABSOLUTE: 0.33 E9/L (ref 0.1–0.95)
MONOCYTES RELATIVE PERCENT: 6.9 % (ref 2–12)
NEUTROPHILS ABSOLUTE: 2.49 E9/L (ref 1.8–7.3)
NEUTROPHILS RELATIVE PERCENT: 52 % (ref 43–80)
PDW BLD-RTO: 13.5 FL (ref 11.5–15)
PLATELET # BLD: 197 E9/L (ref 130–450)
PMV BLD AUTO: 11.6 FL (ref 7–12)
POTASSIUM SERPL-SCNC: 4.4 MMOL/L (ref 3.5–5)
RBC # BLD: 4.27 E12/L (ref 3.5–5.5)
SODIUM BLD-SCNC: 139 MMOL/L (ref 132–146)
T4 FREE: 1.07 NG/DL (ref 0.93–1.7)
TOTAL PROTEIN: 7.2 G/DL (ref 6.4–8.3)
TRIGL SERPL-MCNC: 122 MG/DL (ref 0–149)
TSH SERPL DL<=0.05 MIU/L-ACNC: 3.76 UIU/ML (ref 0.27–4.2)
VITAMIN D 25-HYDROXY: 69 NG/ML (ref 30–100)
VLDLC SERPL CALC-MCNC: 24 MG/DL
WBC # BLD: 4.8 E9/L (ref 4.5–11.5)

## 2021-04-07 PROBLEM — F41.9 ANXIETY AND DEPRESSION: Status: ACTIVE | Noted: 2021-04-07

## 2021-04-07 PROBLEM — F32.A ANXIETY AND DEPRESSION: Status: ACTIVE | Noted: 2021-04-07

## 2021-04-27 ENCOUNTER — HOSPITAL ENCOUNTER (OUTPATIENT)
Dept: GENERAL RADIOLOGY | Age: 56
Discharge: HOME | End: 2021-04-29
Payer: COMMERCIAL

## 2021-04-27 ENCOUNTER — HOSPITAL ENCOUNTER (OUTPATIENT)
Age: 56
Discharge: HOME | End: 2021-04-29
Payer: COMMERCIAL

## 2021-04-27 DIAGNOSIS — R52 PAIN: ICD-10-CM

## 2021-04-27 PROCEDURE — 74018 RADEX ABDOMEN 1 VIEW: CPT

## 2021-09-01 ENCOUNTER — HOSPITAL ENCOUNTER (OUTPATIENT)
Dept: GENERAL RADIOLOGY | Age: 56
Discharge: HOME | End: 2021-09-03
Payer: COMMERCIAL

## 2021-09-01 DIAGNOSIS — Z12.31 VISIT FOR SCREENING MAMMOGRAM: ICD-10-CM

## 2021-09-01 PROCEDURE — 77063 BREAST TOMOSYNTHESIS BI: CPT

## 2021-11-15 DIAGNOSIS — Z20.822 EXPOSURE TO COVID-19 VIRUS: ICD-10-CM

## 2021-11-15 DIAGNOSIS — Z20.822 CLOSE EXPOSURE TO COVID-19 VIRUS: ICD-10-CM

## 2021-11-15 DIAGNOSIS — R53.83 FATIGUE, UNSPECIFIED TYPE: ICD-10-CM

## 2021-11-16 LAB — SARS-COV-2 ANTIBODY, TOTAL: NORMAL

## 2021-11-19 LAB
Lab: NORMAL
REPORT: NORMAL
THIS TEST SENT TO: NORMAL

## 2021-12-07 ENCOUNTER — APPOINTMENT (OUTPATIENT)
Dept: GENERAL RADIOLOGY | Age: 56
End: 2021-12-07
Payer: COMMERCIAL

## 2021-12-07 ENCOUNTER — HOSPITAL ENCOUNTER (EMERGENCY)
Age: 56
Discharge: HOME | End: 2021-12-07
Attending: EMERGENCY MEDICINE
Payer: COMMERCIAL

## 2021-12-07 ENCOUNTER — APPOINTMENT (OUTPATIENT)
Dept: CT IMAGING | Age: 56
End: 2021-12-07
Payer: COMMERCIAL

## 2021-12-07 VITALS
OXYGEN SATURATION: 96 % | RESPIRATION RATE: 16 BRPM | HEART RATE: 77 BPM | DIASTOLIC BLOOD PRESSURE: 82 MMHG | SYSTOLIC BLOOD PRESSURE: 125 MMHG | TEMPERATURE: 96 F

## 2021-12-07 DIAGNOSIS — R20.2 PARESTHESIA: Primary | ICD-10-CM

## 2021-12-07 DIAGNOSIS — E86.0 DEHYDRATION: ICD-10-CM

## 2021-12-07 DIAGNOSIS — U07.1 COVID-19: ICD-10-CM

## 2021-12-07 LAB
ALBUMIN SERPL-MCNC: 3.6 G/DL (ref 3.5–5.2)
ALP BLD-CCNC: 95 U/L (ref 35–104)
ALT SERPL-CCNC: 43 U/L (ref 0–32)
ANION GAP SERPL CALCULATED.3IONS-SCNC: 10 MMOL/L (ref 7–16)
AST SERPL-CCNC: 36 U/L (ref 0–31)
BACTERIA: NORMAL /HPF
BASOPHILS ABSOLUTE: 0.01 E9/L (ref 0–0.2)
BASOPHILS RELATIVE PERCENT: 0.2 % (ref 0–2)
BILIRUB SERPL-MCNC: 0.2 MG/DL (ref 0–1.2)
BILIRUBIN URINE: NEGATIVE
BLOOD, URINE: NEGATIVE
BUN BLDV-MCNC: 9 MG/DL (ref 6–20)
C-REACTIVE PROTEIN: 6.8 MG/DL (ref 0–0.4)
CALCIUM SERPL-MCNC: 8.4 MG/DL (ref 8.6–10.2)
CHLORIDE BLD-SCNC: 108 MMOL/L (ref 98–107)
CLARITY: CLEAR
CO2: 23 MMOL/L (ref 22–29)
COLOR: YELLOW
CREAT SERPL-MCNC: 0.7 MG/DL (ref 0.5–1)
D DIMER: 243 NG/ML DDU
EOSINOPHILS ABSOLUTE: 0.12 E9/L (ref 0.05–0.5)
EOSINOPHILS RELATIVE PERCENT: 2.6 % (ref 0–6)
GFR AFRICAN AMERICAN: >60
GFR NON-AFRICAN AMERICAN: >60 ML/MIN/1.73
GLUCOSE BLD-MCNC: 124 MG/DL (ref 74–99)
GLUCOSE URINE: NEGATIVE MG/DL
HCT VFR BLD CALC: 33.8 % (ref 34–48)
HEMOGLOBIN: 11.2 G/DL (ref 11.5–15.5)
IMMATURE GRANULOCYTES #: 0.04 E9/L
IMMATURE GRANULOCYTES %: 0.9 % (ref 0–5)
KETONES, URINE: NEGATIVE MG/DL
LEUKOCYTE ESTERASE, URINE: NEGATIVE
LIPASE: 36 U/L (ref 13–60)
LYMPHOCYTES ABSOLUTE: 1.59 E9/L (ref 1.5–4)
LYMPHOCYTES RELATIVE PERCENT: 34.7 % (ref 20–42)
MCH RBC QN AUTO: 29.9 PG (ref 26–35)
MCHC RBC AUTO-ENTMCNC: 33.1 % (ref 32–34.5)
MCV RBC AUTO: 90.1 FL (ref 80–99.9)
MONOCYTES ABSOLUTE: 0.25 E9/L (ref 0.1–0.95)
MONOCYTES RELATIVE PERCENT: 5.5 % (ref 2–12)
NEUTROPHILS ABSOLUTE: 2.57 E9/L (ref 1.8–7.3)
NEUTROPHILS RELATIVE PERCENT: 56.1 % (ref 43–80)
NITRITE, URINE: NEGATIVE
PDW BLD-RTO: 13.5 FL (ref 11.5–15)
PH UA: 6 (ref 5–9)
PLATELET # BLD: 282 E9/L (ref 130–450)
PMV BLD AUTO: 10.3 FL (ref 7–12)
POTASSIUM REFLEX MAGNESIUM: 3.6 MMOL/L (ref 3.5–5)
PROTEIN UA: NEGATIVE MG/DL
RBC # BLD: 3.75 E12/L (ref 3.5–5.5)
RBC UA: NORMAL /HPF (ref 0–2)
SODIUM BLD-SCNC: 141 MMOL/L (ref 132–146)
SPECIFIC GRAVITY UA: <=1.005 (ref 1–1.03)
TOTAL PROTEIN: 6.7 G/DL (ref 6.4–8.3)
TROPONIN, HIGH SENSITIVITY: 8 NG/L (ref 0–9)
UROBILINOGEN, URINE: 0.2 E.U./DL
WBC # BLD: 4.6 E9/L (ref 4.5–11.5)
WBC UA: NORMAL /HPF (ref 0–5)

## 2021-12-07 PROCEDURE — 81001 URINALYSIS AUTO W/SCOPE: CPT

## 2021-12-07 PROCEDURE — 71045 X-RAY EXAM CHEST 1 VIEW: CPT

## 2021-12-07 PROCEDURE — 80053 COMPREHEN METABOLIC PANEL: CPT

## 2021-12-07 PROCEDURE — 83690 ASSAY OF LIPASE: CPT

## 2021-12-07 PROCEDURE — 2580000003 HC RX 258: Performed by: EMERGENCY MEDICINE

## 2021-12-07 PROCEDURE — 86140 C-REACTIVE PROTEIN: CPT

## 2021-12-07 PROCEDURE — 85378 FIBRIN DEGRADE SEMIQUANT: CPT

## 2021-12-07 PROCEDURE — 71275 CT ANGIOGRAPHY CHEST: CPT

## 2021-12-07 PROCEDURE — 99284 EMERGENCY DEPT VISIT MOD MDM: CPT

## 2021-12-07 PROCEDURE — 70450 CT HEAD/BRAIN W/O DYE: CPT

## 2021-12-07 PROCEDURE — 6360000004 HC RX CONTRAST MEDICATION: Performed by: RADIOLOGY

## 2021-12-07 PROCEDURE — 84484 ASSAY OF TROPONIN QUANT: CPT

## 2021-12-07 PROCEDURE — 85025 COMPLETE CBC W/AUTO DIFF WBC: CPT

## 2021-12-07 RX ORDER — BROMPHENIRAMINE MALEATE, PSEUDOEPHEDRINE HYDROCHLORIDE, AND DEXTROMETHORPHAN HYDROBROMIDE 2; 30; 10 MG/5ML; MG/5ML; MG/5ML
5 SYRUP ORAL 4 TIMES DAILY PRN
Qty: 240 ML | Refills: 0 | Status: SHIPPED | OUTPATIENT
Start: 2021-12-07 | End: 2022-01-06

## 2021-12-07 RX ORDER — ONDANSETRON 4 MG/1
4 TABLET, ORALLY DISINTEGRATING ORAL 3 TIMES DAILY PRN
Qty: 21 TABLET | Refills: 0 | Status: SHIPPED | OUTPATIENT
Start: 2021-12-07 | End: 2022-02-23 | Stop reason: CLARIF

## 2021-12-07 RX ORDER — BENZONATATE 100 MG/1
100 CAPSULE ORAL 3 TIMES DAILY PRN
Qty: 21 CAPSULE | Refills: 0 | Status: SHIPPED | OUTPATIENT
Start: 2021-12-07 | End: 2021-12-14

## 2021-12-07 RX ORDER — 0.9 % SODIUM CHLORIDE 0.9 %
1000 INTRAVENOUS SOLUTION INTRAVENOUS ONCE
Status: COMPLETED | OUTPATIENT
Start: 2021-12-07 | End: 2021-12-07

## 2021-12-07 RX ADMIN — IOPAMIDOL 75 ML: 755 INJECTION, SOLUTION INTRAVENOUS at 11:29

## 2021-12-07 RX ADMIN — SODIUM CHLORIDE 1000 ML: 9 INJECTION, SOLUTION INTRAVENOUS at 10:07

## 2021-12-07 ASSESSMENT — ENCOUNTER SYMPTOMS
NAUSEA: 1
SHORTNESS OF BREATH: 1
BACK PAIN: 0
ABDOMINAL PAIN: 0

## 2021-12-07 NOTE — ED PROVIDER NOTES
This is a 67-year-old female with a past medical history of GERD as well as asthma who presents to the ED for evaluation of chest pain. Patient states that she tested positive has been having symptoms for the past 15 days. Patient states she has been having some intermittent left-sided chest pain. Patient states that over the past day she woke up and noticed that she had some pronounced numbness to her left arm which prompted her to have some concern to come to the ER. Patient has no speech difficulty vision changes or facial droop. Patient has no diarrhea or vomiting. Patient states that her chest pain is neither mitigated or exacerbated by any factors. Patient states she does have shortness of breath which has been ongoing for about the past 2 weeks as well. She is not any blood thinners not use oxygen at home. No reported falls or trauma. The history is provided by the patient. Review of Systems   Constitutional: Positive for activity change, appetite change, fatigue and fever. HENT: Positive for congestion. Eyes: Negative for visual disturbance. Respiratory: Positive for shortness of breath. Cardiovascular: Positive for chest pain. Gastrointestinal: Positive for nausea. Negative for abdominal pain. Endocrine: Negative for polyuria. Genitourinary: Negative for dysuria. Musculoskeletal: Negative for back pain. Skin: Negative for rash. Allergic/Immunologic: Negative for immunocompromised state. Neurological: Negative for headaches. Hematological: Does not bruise/bleed easily. Psychiatric/Behavioral: Negative for confusion. Physical Exam  Vitals and nursing note reviewed. Constitutional:       General: She is not in acute distress. Appearance: She is well-developed. She is not ill-appearing. HENT:      Head: Normocephalic and atraumatic. Mouth/Throat:      Mouth: Mucous membranes are dry. Eyes:      Extraocular Movements: Extraocular movements intact. Chávez's esophagus, Depression, GERD (gastroesophageal reflux disease), Sleep apnea, and Thyroid disease. Past Surgical History:  has a past surgical history that includes Appendectomy (1986); Hysterectomy; shoulder surgery (Right); Ankle surgery (Left); Cholecystectomy (1993); Hand surgery (Left); Colonoscopy; Endoscopy, colon, diagnostic; Breast surgery; Knee arthroscopy (Left, 06/11/2020); and Knee arthroscopy (Left, 6/11/2020). Social History:  reports that she has never smoked. She has never used smokeless tobacco. She reports current alcohol use. She reports that she does not use drugs. Family History: family history includes COPD in her brother; Cancer (age of onset: 47) in her brother; Cancer (age of onset: 66) in her father; Hypertension in her father; No Known Problems in her mother; Other in her father. The patients home medications have been reviewed.     Allergies: Sulfa antibiotics, Vancomycin, and Penicillins    -------------------------------------------------- RESULTS -------------------------------------------------  Labs:  Results for orders placed or performed during the hospital encounter of 12/07/21   Comprehensive Metabolic Panel w/ Reflex to MG   Result Value Ref Range    Sodium 141 132 - 146 mmol/L    Potassium reflex Magnesium 3.6 3.5 - 5.0 mmol/L    Chloride 108 (H) 98 - 107 mmol/L    CO2 23 22 - 29 mmol/L    Anion Gap 10 7 - 16 mmol/L    Glucose 124 (H) 74 - 99 mg/dL    BUN 9 6 - 20 mg/dL    CREATININE 0.7 0.5 - 1.0 mg/dL    GFR Non-African American >60 >=60 mL/min/1.73    GFR African American >60     Calcium 8.4 (L) 8.6 - 10.2 mg/dL    Total Protein 6.7 6.4 - 8.3 g/dL    Albumin 3.6 3.5 - 5.2 g/dL    Total Bilirubin 0.2 0.0 - 1.2 mg/dL    Alkaline Phosphatase 95 35 - 104 U/L    ALT 43 (H) 0 - 32 U/L    AST 36 (H) 0 - 31 U/L   CBC Auto Differential   Result Value Ref Range    WBC 4.6 4.5 - 11.5 E9/L    RBC 3.75 3.50 - 5.50 E12/L    Hemoglobin 11.2 (L) 11.5 - 15.5 g/dL Hematocrit 33.8 (L) 34.0 - 48.0 %    MCV 90.1 80.0 - 99.9 fL    MCH 29.9 26.0 - 35.0 pg    MCHC 33.1 32.0 - 34.5 %    RDW 13.5 11.5 - 15.0 fL    Platelets 159 883 - 618 E9/L    MPV 10.3 7.0 - 12.0 fL    Neutrophils % 56.1 43.0 - 80.0 %    Immature Granulocytes % 0.9 0.0 - 5.0 %    Lymphocytes % 34.7 20.0 - 42.0 %    Monocytes % 5.5 2.0 - 12.0 %    Eosinophils % 2.6 0.0 - 6.0 %    Basophils % 0.2 0.0 - 2.0 %    Neutrophils Absolute 2.57 1.80 - 7.30 E9/L    Immature Granulocytes # 0.04 E9/L    Lymphocytes Absolute 1.59 1.50 - 4.00 E9/L    Monocytes Absolute 0.25 0.10 - 0.95 E9/L    Eosinophils Absolute 0.12 0.05 - 0.50 E9/L    Basophils Absolute 0.01 0.00 - 0.20 E9/L   C-reactive protein   Result Value Ref Range    CRP 6.8 (H) 0.0 - 0.4 mg/dL   D-Dimer, Quantitative   Result Value Ref Range    D-Dimer, Quant 243 ng/mL DDU   Troponin   Result Value Ref Range    Troponin, High Sensitivity 8 0 - 9 ng/L   Urinalysis with Microscopic   Result Value Ref Range    Color, UA Yellow Straw/Yellow    Clarity, UA Clear Clear    Glucose, Ur Negative Negative mg/dL    Bilirubin Urine Negative Negative    Ketones, Urine Negative Negative mg/dL    Specific Gravity, UA <=1.005 1.005 - 1.030    Blood, Urine Negative Negative    pH, UA 6.0 5.0 - 9.0    Protein, UA Negative Negative mg/dL    Urobilinogen, Urine 0.2 <2.0 E.U./dL    Nitrite, Urine Negative Negative    Leukocyte Esterase, Urine Negative Negative    WBC, UA NONE 0 - 5 /HPF    RBC, UA NONE 0 - 2 /HPF    Bacteria, UA NONE SEEN None Seen /HPF   Lipase   Result Value Ref Range    Lipase 36 13 - 60 U/L       Radiology:  CT HEAD WO CONTRAST   Final Result   No acute intracranial abnormality. CTA PULMONARY W CONTRAST   Final Result   1. Pulmonary opacities which are nonspecific but consistent with COVID   pneumonia   2.  No acute pulmonary embolus is identified         XR CHEST PORTABLE   Final Result   Patchy bilateral airspace disease likely represents pneumonia with history of   fever. There are discrete nodular opacities in the upper lung zones with   possible cavitation in the left upper lung nodular opacity. Consider septic   emboli. Cavitation can also occur with other infectious etiologies such as   tuberculosis, coccidiomycosis, nocardiosis, and COVID-19.             ------------------------- NURSING NOTES AND VITALS REVIEWED ---------------------------  Date / Time Roomed:  12/7/2021  9:30 AM  ED Bed Assignment:  09/09    The nursing notes within the ED encounter and vital signs as below have been reviewed. /82   Pulse 77   Temp 96 °F (35.6 °C) (Temporal)   Resp 16   SpO2 96%   Oxygen Saturation Interpretation: Normal      ------------------------------------------ PROGRESS NOTES ------------------------------------------  1:00 PM EST  I have spoken with the patient and discussed todays results, in addition to providing specific details for the plan of care and counseling regarding the diagnosis and prognosis. Their questions are answered at this time and they are agreeable with the plan. I discussed at length with them reasons for immediate return here for re evaluation. They will followup with their PCP.    --------------------------------- ADDITIONAL PROVIDER NOTES ---------------------------------  At this time the patient is without objective evidence of an acute process requiring hospitalization or inpatient management. They have remained hemodynamically stable throughout their entire ED visit and are stable for discharge with outpatient follow-up. The plan has been discussed in detail and they are aware of the specific conditions for emergent return, as well as the importance of follow-up.       Discharge Medication List as of 12/7/2021 11:55 AM      START taking these medications    Details   brompheniramine-pseudoephedrine-DM (BROMFED DM) 2-30-10 MG/5ML syrup Take 5 mLs by mouth 4 times daily as needed for Congestion or Cough, Disp-240 mL, R-0Print      benzonatate (TESSALON) 100 MG capsule Take 1 capsule by mouth 3 times daily as needed for Cough, Disp-21 capsule, R-0Print      ondansetron (ZOFRAN-ODT) 4 MG disintegrating tablet Take 1 tablet by mouth 3 times daily as needed for Nausea or Vomiting, Disp-21 tablet, R-0Print             Diagnosis:  1. Paresthesia    2. COVID-19    3. Dehydration        Disposition:  Patient's disposition: Discharge to home  Patient's condition is stable.      Ochoa Kenyon,   12/09/21 [id number]

## 2021-12-07 NOTE — ED NOTES
Bed: 09  Expected date:   Expected time:   Means of arrival:   Comments:  terminal     Lukas Leal RN  12/07/21 [id number]

## 2022-01-05 ENCOUNTER — HOSPITAL ENCOUNTER (OUTPATIENT)
Dept: ULTRASOUND IMAGING | Age: 57
Discharge: HOME | End: 2022-01-05
Payer: COMMERCIAL

## 2022-01-05 DIAGNOSIS — R39.14 FEELING OF INCOMPLETE BLADDER EMPTYING: ICD-10-CM

## 2022-01-05 DIAGNOSIS — N20.0 CALCULUS OF KIDNEY: ICD-10-CM

## 2022-01-05 PROCEDURE — 76770 US EXAM ABDO BACK WALL COMP: CPT

## 2022-01-05 PROCEDURE — 76775 US EXAM ABDO BACK WALL LIM: CPT

## 2022-02-28 DIAGNOSIS — R53.83 FATIGUE, UNSPECIFIED TYPE: ICD-10-CM

## 2022-02-28 DIAGNOSIS — E55.9 VITAMIN D DEFICIENCY: ICD-10-CM

## 2022-02-28 DIAGNOSIS — R07.9 CHEST PAIN, UNSPECIFIED TYPE: ICD-10-CM

## 2022-02-28 DIAGNOSIS — E78.5 DYSLIPIDEMIA: ICD-10-CM

## 2022-02-28 LAB
ALBUMIN SERPL-MCNC: 4.3 G/DL (ref 3.5–5.2)
ALP BLD-CCNC: 71 U/L (ref 35–104)
ALT SERPL-CCNC: 28 U/L (ref 0–32)
ANION GAP SERPL CALCULATED.3IONS-SCNC: 15 MMOL/L (ref 7–16)
AST SERPL-CCNC: 26 U/L (ref 0–31)
BASOPHILS ABSOLUTE: 0.03 E9/L (ref 0–0.2)
BASOPHILS RELATIVE PERCENT: 0.5 % (ref 0–2)
BILIRUB SERPL-MCNC: <0.2 MG/DL (ref 0–1.2)
BUN BLDV-MCNC: 17 MG/DL (ref 6–20)
CALCIUM SERPL-MCNC: 9 MG/DL (ref 8.6–10.2)
CHLORIDE BLD-SCNC: 106 MMOL/L (ref 98–107)
CHOLESTEROL, TOTAL: 244 MG/DL (ref 0–199)
CO2: 20 MMOL/L (ref 22–29)
CREAT SERPL-MCNC: 0.9 MG/DL (ref 0.5–1)
EOSINOPHILS ABSOLUTE: 0.16 E9/L (ref 0.05–0.5)
EOSINOPHILS RELATIVE PERCENT: 2.9 % (ref 0–6)
GFR AFRICAN AMERICAN: >60
GFR NON-AFRICAN AMERICAN: >60 ML/MIN/1.73
GLUCOSE BLD-MCNC: 118 MG/DL (ref 74–99)
HBA1C MFR BLD: 6 % (ref 4–5.6)
HCT VFR BLD CALC: 41.8 % (ref 34–48)
HDLC SERPL-MCNC: 61 MG/DL
HEMOGLOBIN: 13.6 G/DL (ref 11.5–15.5)
IMMATURE GRANULOCYTES #: 0.02 E9/L
IMMATURE GRANULOCYTES %: 0.4 % (ref 0–5)
LDL CHOLESTEROL CALCULATED: 162 MG/DL (ref 0–99)
LYMPHOCYTES ABSOLUTE: 2.15 E9/L (ref 1.5–4)
LYMPHOCYTES RELATIVE PERCENT: 38.9 % (ref 20–42)
MCH RBC QN AUTO: 29.3 PG (ref 26–35)
MCHC RBC AUTO-ENTMCNC: 32.5 % (ref 32–34.5)
MCV RBC AUTO: 90.1 FL (ref 80–99.9)
MONOCYTES ABSOLUTE: 0.41 E9/L (ref 0.1–0.95)
MONOCYTES RELATIVE PERCENT: 7.4 % (ref 2–12)
NEUTROPHILS ABSOLUTE: 2.76 E9/L (ref 1.8–7.3)
NEUTROPHILS RELATIVE PERCENT: 49.9 % (ref 43–80)
PDW BLD-RTO: 14 FL (ref 11.5–15)
PLATELET # BLD: 208 E9/L (ref 130–450)
PMV BLD AUTO: 11.5 FL (ref 7–12)
POTASSIUM SERPL-SCNC: 4.4 MMOL/L (ref 3.5–5)
RBC # BLD: 4.64 E12/L (ref 3.5–5.5)
SODIUM BLD-SCNC: 141 MMOL/L (ref 132–146)
T4 FREE: 1.13 NG/DL (ref 0.93–1.7)
TOTAL PROTEIN: 7.2 G/DL (ref 6.4–8.3)
TRIGL SERPL-MCNC: 104 MG/DL (ref 0–149)
TSH SERPL DL<=0.05 MIU/L-ACNC: 4.74 UIU/ML (ref 0.27–4.2)
VITAMIN D 25-HYDROXY: 87 NG/ML (ref 30–100)
VLDLC SERPL CALC-MCNC: 21 MG/DL
WBC # BLD: 5.5 E9/L (ref 4.5–11.5)

## 2022-03-18 ENCOUNTER — OFFICE VISIT (OUTPATIENT)
Dept: ORTHOPEDIC SURGERY | Age: 57
End: 2022-03-18
Payer: COMMERCIAL

## 2022-03-18 VITALS — WEIGHT: 222 LBS | HEIGHT: 67 IN | BODY MASS INDEX: 34.84 KG/M2 | TEMPERATURE: 98 F

## 2022-03-18 DIAGNOSIS — M94.261 CHONDROMALACIA, RIGHT KNEE: ICD-10-CM

## 2022-03-18 DIAGNOSIS — M65.9 SYNOVITIS OF RIGHT KNEE: Primary | ICD-10-CM

## 2022-03-18 PROCEDURE — G8417 CALC BMI ABV UP PARAM F/U: HCPCS | Performed by: ORTHOPAEDIC SURGERY

## 2022-03-18 PROCEDURE — 1036F TOBACCO NON-USER: CPT | Performed by: ORTHOPAEDIC SURGERY

## 2022-03-18 PROCEDURE — 99203 OFFICE O/P NEW LOW 30 MIN: CPT | Performed by: ORTHOPAEDIC SURGERY

## 2022-03-18 PROCEDURE — G8427 DOCREV CUR MEDS BY ELIG CLIN: HCPCS | Performed by: ORTHOPAEDIC SURGERY

## 2022-03-18 PROCEDURE — 3017F COLORECTAL CA SCREEN DOC REV: CPT | Performed by: ORTHOPAEDIC SURGERY

## 2022-03-18 PROCEDURE — G8484 FLU IMMUNIZE NO ADMIN: HCPCS | Performed by: ORTHOPAEDIC SURGERY

## 2022-03-18 NOTE — PROGRESS NOTES
Odalys Layton is a 55 y.o. female, who presents   Chief Complaint   Patient presents with    Knee Pain     B/L knee pain. Mostly the right knee is causing pain. Has been walking more over the past few weeks and having more pain in right knee. HPI[de-identified] Right knee pain is been present for some time. This seems to be more medial and with more activity there is a feeling of instability in the knee feels shaky as if is going to give out. There is medial discomfort. Phillip Cockayne has begun a walking program for exercise and tries to walk for 30-minute intervals. This is somewhat difficult because of her persistent knee pain. She had been seen before and had an MRI which was equivocal for meniscus tear at that time but did show some chondral damage. She is seeking treatment now because of the persistent pain and the feelings of instability. Allergies; medications; past medical, surgical, family, and social history; and problem list have been reviewed today and updated as indicated in this encounter - see below following Ortho specifics. Musculoskeletal: Skin condition gross neurovascular function is good in right lower extremity. In stance there is slight varus malalignment of the right knee. Gait is deliberate and a little bit slow. Range of motion of the right knee is near 0 to 105 degrees of flexion. She does have some medial gapping with stress examination and some discomfort associated with this. She also has pain with Ferdinand testing. Collateral cruciate ligaments are intact. The knee is not hot or red this morning. Effusion is difficult to assess because of her body habitus. Her calf is supple with no tenderness. Radiologic Studies: Previous imaging studies of the knee including MRI were reviewed showing evidence of wear and form of chondral thinning medial side and some patellar changes as well. These films from 9/2020    ASSESSMENT:  Phillip Cockayne was seen today for knee pain.     Diagnoses and all orders for this visit:    Synovitis of right knee    Chondromalacia, right knee     Treatment alternatives were reviewed including medical and physical therapies, injections, and surgical options, expected risks benefits and likely outcome of each were discussed in detail, questions asked and answered and understood. We discussed the symptom as well as physical findings and imaging results. Seems that her problems have persisted and progressed. She still has symptoms would suggest meniscus pathology. We discussed that repeat MRI to further evaluate this in light of her symptoms and we will seek approval for the exam and proceed accordingly. PLAN: We will seek approval for MRI right knee and proceed accordingly.         Patient Active Problem List   Diagnosis    Vitamin D deficiency    Dyslipidemia    Mild intermittent asthma without complication    Bee allergy status    Chronic pain syndrome    Lumbar facet arthropathy    Lumbar disc disorder    JOSÉ MIGUEL (obstructive sleep apnea)    Esophagitis    Derangement of posterior horn of medial meniscus due to old tear or injury, left knee    Chondromalacia, left knee    Synovitis of left knee    Anxiety and depression       Past Medical History:   Diagnosis Date    Anxiety     Asthma     Chávez's esophagus     Depression     GERD (gastroesophageal reflux disease)     Sleep apnea     uses cpap    Thyroid disease     newly dx hypothyroid  (no meds ordered yet)       Past Surgical History:   Procedure Laterality Date    ANKLE SURGERY Left     APPENDECTOMY  1986    BREAST SURGERY      bx x2 left neg    CHOLECYSTECTOMY  1993    COLONOSCOPY      ENDOSCOPY, COLON, DIAGNOSTIC      HAND SURGERY Left     HYSTERECTOMY      KNEE ARTHROSCOPY Left 06/11/2020    Arthroscopic Exam and Treatment of left knee    KNEE ARTHROSCOPY Left 6/11/2020    ARTHROSCOPIC EXAM AND TREATMENT LEFT KNEE, MEDIAL MENISECTOMY, CHONDROPASTY AND SYNOVECTOMY performed by EMANUEL Krystal Fass, DO at [address] [facility]        Current Outpatient Medications   Medication Sig Dispense Refill    venlafaxine (EFFEXOR XR) 37.5 MG extended release capsule Take 112.5 mg by mouth daily       albuterol sulfate  (90 Base) MCG/ACT inhaler Inhale 2 puffs into the lungs 4 times daily as needed for Wheezing (Patient not taking: Reported on 2/23/2022) 3 each 1    budesonide-formoterol (SYMBICORT) 160-4.5 MCG/ACT AERO Inhale 2 puffs into the lungs 2 times daily (Patient not taking: Reported on 2/23/2022) 3 each 1    pantoprazole (PROTONIX) 40 MG tablet Take 40 mg by mouth daily       lactulose (CHRONULAC) 10 GM/15ML solution TAKE 15ML BY MOUTH ONCE TO TWICE DAILY AS DIRECTED      VITAMIN D PO Take 15,000 mg by mouth daily      EPINEPHrine (EPIPEN 2-YURIY) 0.3 MG/0.3ML SOAJ injection Inject 0.3 mLs into the muscle once for 1 dose Use as directed for allergic reaction 0.3 mL 1    zinc 50 MG TABS tablet Take 50 mg by mouth daily      NONFORMULARY Take 1 capsule by mouth daily Immune Enhancer      diazepam (VALIUM) 5 MG tablet Take 5 mg by mouth nightly as needed for Anxiety.  PREBIOTIC PRODUCT PO Take by mouth daily       Probiotic Product (PROBIOTIC DAILY PO) Take by mouth daily       traZODone (DESYREL) 100 MG tablet Take 100 mg by mouth nightly       Omega-3 Fatty Acids (FISH OIL) 1200 MG CPDR Take 1 tablet by mouth 2 times daily 2 caps  Stopped 5 days pre op      venlafaxine (EFFEXOR XR) 150 MG extended release capsule Take 150 mg by mouth nightly        No current facility-administered medications for this visit.        Allergies   Allergen Reactions    Sulfa Antibiotics Itching    Vancomycin Itching    Penicillins Rash       Social History     Socioeconomic History    Marital status:      Spouse name: None    Number of children: 2    Years of education: None    Highest education level: None   Occupational History     Employer: [employer]   Tobacco Use    Smoking status: Never Smoker    Smokeless tobacco: Never Used   Vaping Use    Vaping Use: Never used   Substance and Sexual Activity    Alcohol use: Yes     Comment: occas    Drug use: No    Sexual activity: None   Other Topics Concern    None   Social History Narrative    None     Social Determinants of Health     Financial Resource Strain: Low Risk     Difficulty of Paying Living Expenses: Not hard at all   Food Insecurity: No Food Insecurity    Worried About Running Out of Food in the Last Year: Never true    Virgilio of Food in the Last Year: Never true   Transportation Needs:     Lack of Transportation (Medical): Not on file    Lack of Transportation (Non-Medical):  Not on file   Physical Activity:     Days of Exercise per Week: Not on file    Minutes of Exercise per Session: Not on file   Stress:     Feeling of Stress : Not on file   Social Connections:     Frequency of Communication with Friends and Family: Not on file    Frequency of Social Gatherings with Friends and Family: Not on file    Attends Islam Services: Not on file    Active Member of [address] [place] [address] or Organizations: Not on file    Attends Club or Organization Meetings: Not on file    Marital Status: Not on file   Intimate Partner Violence:     Fear of Current or Ex-Partner: Not on file    Emotionally Abused: Not on file    Physically Abused: Not on file    Sexually Abused: Not on file   Housing Stability:     Unable to Pay for Housing in the Last Year: Not on file    Number of [place] in the Last Year: Not on file    Unstable Housing in the Last Year: Not on file       Family History   Problem Relation Age of Onset    No Known Problems Mother     Cancer Father 66        Colon    Other Father         AAA    Hypertension Father     Cancer Brother 47        Lung    COPD Brother          Review of Systems:   As follows except as previously noted in HPI:  Constitutional: Negative for chills, diaphoresis,  fever   Respiratory: Negative for cough, shortness of breath and wheezing. Cardiovascular: Negative for chest pain and palpitations. Neurological: Negative for dizziness, syncope,   GI / : abdominal pain or cramping  Musculoskeletal: see HPI       Objective:   Physical Exam   Constitutional: Oriented to person, place, and time. and appears well-developed and well-nourished. :   Head: Normocephalic and atraumatic. Neck: Neck supple. Eyes: EOM are normal.   Pulmonary/Chest: Effort normal.  No respiratory distress, no wheezes. Neurological: Alert and oriented to person  Skin: Skin is warm and dry. [place] DO Ken    3/18/22  9:28 AM    All reasonable efforts have been made to minimize the risk of errors that may occur in the use of voice recognition and other electronic means of charting.

## 2022-03-30 DIAGNOSIS — U07.1 COVID-19: ICD-10-CM

## 2022-03-30 LAB — SARS-COV-2 ANTIBODY, TOTAL: REACTIVE

## 2022-04-01 ENCOUNTER — HOSPITAL ENCOUNTER (OUTPATIENT)
Dept: NON INVASIVE DIAGNOSTICS | Age: 57
Discharge: HOME | End: 2022-04-01
Payer: COMMERCIAL

## 2022-04-01 DIAGNOSIS — R07.9 CHEST PAIN, UNSPECIFIED TYPE: ICD-10-CM

## 2022-04-01 PROCEDURE — 93018 CV STRESS TEST I&R ONLY: CPT | Performed by: INTERNAL MEDICINE

## 2022-04-01 PROCEDURE — 93017 CV STRESS TEST TRACING ONLY: CPT

## 2022-04-01 PROCEDURE — 93016 CV STRESS TEST SUPVJ ONLY: CPT | Performed by: INTERNAL MEDICINE

## 2022-04-01 NOTE — PROGRESS NOTES
Exercise Treadmill Stress Test:    Reason for study: Chest pain    Resting EKG showed Sinus rhythm    Exam:  Heart - Regular, Lungs - Clear    Exercised on Fadi protocol for 7 minutes and 01 seconds; achieved maximal heart rate of 155, which is 93% of age predicted target heart rate. Achieved 10.1 METS. Duke treadmill score 7    EKG:  No ischemia or arrhythmia during treadmill stress. BP:  Peak BP  mmHg     Symptoms: No chest pain, Mild progressive short of breath, resolved. O2 Sat 95%. Post test complications: None      Conclusion: Normal Exercise Treadmill Stress Test - no CP, No EKG ischemia; Achieved 93% of THR, 10.1 METS, duke treadmill score of 7  No prior study to compare. No prior study to compare.     Electronically signed by Parveen Silverman MD on 4/1/2022 at 12:29 PM

## 2022-04-04 ENCOUNTER — OFFICE VISIT (OUTPATIENT)
Dept: ORTHOPEDIC SURGERY | Age: 57
End: 2022-04-04
Payer: COMMERCIAL

## 2022-04-04 VITALS — TEMPERATURE: 98 F | WEIGHT: 220 LBS | HEIGHT: 67 IN | BODY MASS INDEX: 34.53 KG/M2

## 2022-04-04 DIAGNOSIS — M65.9 SYNOVITIS OF RIGHT KNEE: Primary | ICD-10-CM

## 2022-04-04 DIAGNOSIS — M94.261 CHONDROMALACIA, RIGHT KNEE: ICD-10-CM

## 2022-04-04 DIAGNOSIS — M85.651 BONE CYST OF RIGHT FEMUR: ICD-10-CM

## 2022-04-04 PROCEDURE — G8427 DOCREV CUR MEDS BY ELIG CLIN: HCPCS | Performed by: ORTHOPAEDIC SURGERY

## 2022-04-04 PROCEDURE — 3017F COLORECTAL CA SCREEN DOC REV: CPT | Performed by: ORTHOPAEDIC SURGERY

## 2022-04-04 PROCEDURE — 1036F TOBACCO NON-USER: CPT | Performed by: ORTHOPAEDIC SURGERY

## 2022-04-04 PROCEDURE — 99213 OFFICE O/P EST LOW 20 MIN: CPT | Performed by: ORTHOPAEDIC SURGERY

## 2022-04-04 PROCEDURE — G8417 CALC BMI ABV UP PARAM F/U: HCPCS | Performed by: ORTHOPAEDIC SURGERY

## 2022-04-04 NOTE — PROGRESS NOTES
Chief Complaint:   Chief Complaint   Patient presents with    Knee Pain     MRI f/u right knee       Kate Chambers comes in today to go over MRI of her right knee. She is not having the pain that she had with her left knee. It is bothersome some but she says is not interfered with her quality of life yet. .  Discussed issues surgery prior to this and also more specifically justification for arthroscopic surgery and coverage of her procedure. Allergies; medications; past medical, surgical, family, and social history; and problem list have been reviewed today and updated as indicated in this encounter seen below. Exam: Range of motion of the right knee is as noted before. There is no gross instability. There is crepitus throughout the range of motion. Radiographs: MRI [facility] ([facility]) 3/30/2022 showed significant chondromalacia medial compartment and patellofemoral.  There also of the bone cyst previously noted. Discrete tear of the medial meniscus was not identified though there was some extrusion of the medial side which substantiates the medial wear. ASSESSMENT:    Cuong Snyder was seen today for knee pain. Diagnoses and all orders for this visit:    Synovitis of right knee  -     External Referral To Physical Therapy    Chondromalacia, right knee  -     External Referral To Physical Therapy    Bone cyst of right femur        PLAN: We discussed treatment options and Cuong Snyder will try physical therapy to help with her discomfort. She is not interested in knee replacement at this point. Return in about 4 weeks (around 5/2/2022).        Current Outpatient Medications   Medication Sig Dispense Refill    venlafaxine (EFFEXOR XR) 37.5 MG extended release capsule Take 112.5 mg by mouth daily       albuterol sulfate  (90 Base) MCG/ACT inhaler Inhale 2 puffs into the lungs 4 times daily as needed for Wheezing 3 each 1    budesonide-formoterol (SYMBICORT) 160-4.5 MCG/ACT AERO Inhale 2 puffs into the lungs 2 times daily 3 each 1    pantoprazole (PROTONIX) 40 MG tablet Take 40 mg by mouth daily       lactulose (CHRONULAC) 10 GM/15ML solution TAKE 15ML BY MOUTH ONCE TO TWICE DAILY AS DIRECTED      VITAMIN D PO Take 15,000 mg by mouth daily      EPINEPHrine (EPIPEN 2-YURIY) 0.3 MG/0.3ML SOAJ injection Inject 0.3 mLs into the muscle once for 1 dose Use as directed for allergic reaction 0.3 mL 1    zinc 50 MG TABS tablet Take 50 mg by mouth daily      NONFORMULARY Take 1 capsule by mouth daily Immune Enhancer      diazepam (VALIUM) 5 MG tablet Take 5 mg by mouth nightly as needed for Anxiety.  PREBIOTIC PRODUCT PO Take by mouth daily       Probiotic Product (PROBIOTIC DAILY PO) Take by mouth daily       traZODone (DESYREL) 100 MG tablet Take 100 mg by mouth nightly       Omega-3 Fatty Acids (FISH OIL) 1200 MG CPDR Take 1 tablet by mouth 2 times daily 2 caps  Stopped 5 days pre op      venlafaxine (EFFEXOR XR) 150 MG extended release capsule Take 150 mg by mouth nightly        No current facility-administered medications for this visit.        Patient Active Problem List   Diagnosis    Vitamin D deficiency    Dyslipidemia    Mild intermittent asthma without complication    Bee allergy status    Chronic pain syndrome    Lumbar facet arthropathy    Lumbar disc disorder    JOSÉ MIGUEL (obstructive sleep apnea)    Esophagitis    Derangement of posterior horn of medial meniscus due to old tear or injury, left knee    Chondromalacia, left knee    Synovitis of left knee    Anxiety and depression       Past Medical History:   Diagnosis Date    Anxiety     Asthma     Chávez's esophagus     Depression     GERD (gastroesophageal reflux disease)     H/O cardiovascular stress test 04/01/2022    Exercise Treadmill Stress    Sleep apnea     uses cpap    Thyroid disease     newly dx hypothyroid  (no meds ordered yet)       Past Surgical History:   Procedure Laterality Date    ANKLE SURGERY Left     APPENDECTOMY  1986    BREAST SURGERY      bx x2 left neg    CHOLECYSTECTOMY  1993    COLONOSCOPY      ENDOSCOPY, COLON, DIAGNOSTIC      HAND SURGERY Left     HYSTERECTOMY      KNEE ARTHROSCOPY Left 06/11/2020    Arthroscopic Exam and Treatment of left knee    KNEE ARTHROSCOPY Left 6/11/2020    ARTHROSCOPIC EXAM AND TREATMENT LEFT KNEE, MEDIAL MENISECTOMY, CHONDROPASTY AND SYNOVECTOMY performed by Ko Medina DO at [address] Right        Allergies   Allergen Reactions    Sulfa Antibiotics Itching    Vancomycin Itching    Penicillins Rash       Social History     Socioeconomic History    Marital status:      Spouse name: None    Number of children: 2    Years of education: None    Highest education level: None   Occupational History     Employer: [employer]    Tobacco Use    Smoking status: Never Smoker    Smokeless tobacco: Never Used   Vaping Use    Vaping Use: Never used   Substance and Sexual Activity    Alcohol use: Yes     Comment: occas    Drug use: No    Sexual activity: None   Other Topics Concern    None   Social History Narrative    None     Social Determinants of Health     Financial Resource Strain: Low Risk     Difficulty of Paying Living Expenses: Not hard at all   Food Insecurity: No Food Insecurity    Worried About Running Out of Food in the Last Year: Never true    Virgilio of Food in the Last Year: Never true   Transportation Needs:     Lack of Transportation (Medical): Not on file    Lack of Transportation (Non-Medical):  Not on file   Physical Activity:     Days of Exercise per Week: Not on file    Minutes of Exercise per Session: Not on file   Stress:     Feeling of Stress : Not on file   Social Connections:     Frequency of Communication with Friends and Family: Not on file    Frequency of Social Gatherings with Friends and Family: Not on file    Attends Restorationism Services: Not on file   [employer] of Clubs or Organizations: Not on file    Attends Club or Organization Meetings: Not on file    Marital Status: Not on file   Intimate Partner Violence:     Fear of Current or Ex-Partner: Not on file    Emotionally Abused: Not on file    Physically Abused: Not on file    Sexually Abused: Not on file   Housing Stability:     Unable to Pay for Housing in the Last Year: Not on file    Number of [place] in the Last Year: Not on file    Unstable Housing in the Last Year: Not on file       Review of Systems  As follows except as previously noted in HPI:  Constitutional: Negative for chills, diaphoresis, fatigue, fever and unexpected weight change. Respiratory: Negative for cough, shortness of breath and wheezing. Cardiovascular: Negative for chest pain and palpitations. Neurological: Negative for dizziness, syncope, cephalgia. GI / : negative  Musculoskeletal: see HPI       Objective:   Physical Exam   Constitutional: Oriented to person, place, and time. and appears well-developed and well-nourished. :   Head: Normocephalic and atraumatic. Eyes: EOM are normal.   Neck: Neck supple. Cardiovascular: Normal rate and regular rhythm. Pulmonary/Chest: Effort normal. No stridor. No respiratory distress, no wheezes. Abdominal:  No abnormal distension. Neurological: Alert and oriented to person, place, and time. Skin: Skin is warm and dry. Psychiatric: Normal mood and affect.  Behavior is normal. Thought content normal.    EMANUEL Ortega DO    4/4/22  11:46 AM

## 2022-05-04 ENCOUNTER — OFFICE VISIT (OUTPATIENT)
Dept: ORTHOPEDIC SURGERY | Age: 57
End: 2022-05-04
Payer: COMMERCIAL

## 2022-05-04 VITALS — TEMPERATURE: 98 F | HEIGHT: 67 IN | WEIGHT: 220 LBS | BODY MASS INDEX: 34.53 KG/M2

## 2022-05-04 DIAGNOSIS — M23.222 DERANGEMENT OF POSTERIOR HORN OF MEDIAL MENISCUS DUE TO OLD TEAR OR INJURY, LEFT KNEE: ICD-10-CM

## 2022-05-04 DIAGNOSIS — M65.9 SYNOVITIS OF LEFT KNEE: ICD-10-CM

## 2022-05-04 DIAGNOSIS — M94.262 CHONDROMALACIA, LEFT KNEE: ICD-10-CM

## 2022-05-04 DIAGNOSIS — M65.9 SYNOVITIS OF RIGHT KNEE: Primary | ICD-10-CM

## 2022-05-04 DIAGNOSIS — M85.651 BONE CYST OF RIGHT FEMUR: ICD-10-CM

## 2022-05-04 DIAGNOSIS — S83.232A COMPLEX TEAR OF MEDIAL MENISCUS OF LEFT KNEE, INITIAL ENCOUNTER: ICD-10-CM

## 2022-05-04 DIAGNOSIS — M94.261 CHONDROMALACIA, RIGHT KNEE: ICD-10-CM

## 2022-05-04 PROCEDURE — 3017F COLORECTAL CA SCREEN DOC REV: CPT | Performed by: ORTHOPAEDIC SURGERY

## 2022-05-04 PROCEDURE — 1036F TOBACCO NON-USER: CPT | Performed by: ORTHOPAEDIC SURGERY

## 2022-05-04 PROCEDURE — 99213 OFFICE O/P EST LOW 20 MIN: CPT | Performed by: ORTHOPAEDIC SURGERY

## 2022-05-04 PROCEDURE — G8427 DOCREV CUR MEDS BY ELIG CLIN: HCPCS | Performed by: ORTHOPAEDIC SURGERY

## 2022-05-04 PROCEDURE — G8417 CALC BMI ABV UP PARAM F/U: HCPCS | Performed by: ORTHOPAEDIC SURGERY

## 2022-05-04 NOTE — PROGRESS NOTES
Chief Complaint:   Chief Complaint   Patient presents with    Knee Pain     right knee pain still doing PT, has good and bad days. Jonn Harris follows up for her knees. The right knee is primary problem but she is also had some discomfort in the left knee. We seen her for that before. She has been going to physical therapy and has noticed an improvement in her comfort and function. She does have concerns about her daughter's wedding which is in mid July and she would like to be able to dance at a wedding. She is indicating she like shots in her knees. We discussed diet as well as the expectations. Allergies; medications; past medical, surgical, family, and social history; and problem list have been reviewed today and updated as indicated in this encounter seen below. Exam: Range of motion of her knees is good with crepitus throughout the range in both knees. She has some medial lateral gapping consistent with some articular cartilage wear. Ligaments are grossly intact. There is only mild discomfort today. Her calves are supple with no tenderness. Radiographs: Previous MRI of the right knee was reviewed and shows significant fluid signal in the distal femur and also a little proximal tibia as well as significant medial and patellofemoral wear. ASSESSMENT:    Viktor Gupta was seen today for knee pain. Diagnoses and all orders for this visit:    Synovitis of right knee    Derangement of posterior horn of medial meniscus due to old tear or injury, left knee    Bone cyst of right femur    Chondromalacia, right knee    Complex tear of medial meniscus of left knee, initial encounter    Chondromalacia, left knee    Synovitis of left knee        PLAN: We discussed injections and corticosteroid injections could offer some good relief but it would most likely be short term. She should probably time this more slowly with her daughter's wedding to gain maximum relief over that period of time. We will schedule her accordingly. Return if symptoms worsen or fail to improve. Current Outpatient Medications   Medication Sig Dispense Refill    venlafaxine (EFFEXOR XR) 37.5 MG extended release capsule Take 112.5 mg by mouth daily       albuterol sulfate  (90 Base) MCG/ACT inhaler Inhale 2 puffs into the lungs 4 times daily as needed for Wheezing 3 each 1    budesonide-formoterol (SYMBICORT) 160-4.5 MCG/ACT AERO Inhale 2 puffs into the lungs 2 times daily 3 each 1    pantoprazole (PROTONIX) 40 MG tablet Take 40 mg by mouth daily       lactulose (CHRONULAC) 10 GM/15ML solution TAKE 15ML BY MOUTH ONCE TO TWICE DAILY AS DIRECTED      VITAMIN D PO Take 15,000 mg by mouth daily      zinc 50 MG TABS tablet Take 50 mg by mouth daily      NONFORMULARY Take 1 capsule by mouth daily Immune Enhancer      diazepam (VALIUM) 5 MG tablet Take 5 mg by mouth nightly as needed for Anxiety.  PREBIOTIC PRODUCT PO Take by mouth daily       Probiotic Product (PROBIOTIC DAILY PO) Take by mouth daily       traZODone (DESYREL) 100 MG tablet Take 100 mg by mouth nightly       Omega-3 Fatty Acids (FISH OIL) 1200 MG CPDR Take 1 tablet by mouth 2 times daily 2 caps  Stopped 5 days pre op      venlafaxine (EFFEXOR XR) 150 MG extended release capsule Take 150 mg by mouth nightly       EPINEPHrine (EPIPEN 2-YURIY) 0.3 MG/0.3ML SOAJ injection Inject 0.3 mLs into the muscle once for 1 dose Use as directed for allergic reaction 0.3 mL 2     No current facility-administered medications for this visit.        Patient Active Problem List   Diagnosis    Vitamin D deficiency    Dyslipidemia    Mild intermittent asthma without complication    Bee allergy status    Chronic pain syndrome    Lumbar facet arthropathy    Lumbar disc disorder    JOSÉ MIGUEL (obstructive sleep apnea)    Esophagitis    Derangement of posterior horn of medial meniscus due to old tear or injury, left knee    Chondromalacia, left knee    Synovitis of left knee    Anxiety and depression       Past Medical History:   Diagnosis Date    Anxiety     Asthma     Chávez's esophagus     Depression     GERD (gastroesophageal reflux disease)     H/O cardiovascular stress test 04/01/2022    Exercise Treadmill Stress    Sleep apnea     uses cpap    Thyroid disease     newly dx hypothyroid  (no meds ordered yet)       Past Surgical History:   Procedure Laterality Date    ANKLE SURGERY Left     APPENDECTOMY  1986    BREAST SURGERY      bx x2 left neg    CHOLECYSTECTOMY  1993    COLONOSCOPY      ENDOSCOPY, COLON, DIAGNOSTIC      HAND SURGERY Left     HYSTERECTOMY      KNEE ARTHROSCOPY Left 06/11/2020    Arthroscopic Exam and Treatment of left knee    KNEE ARTHROSCOPY Left 6/11/2020    ARTHROSCOPIC EXAM AND TREATMENT LEFT KNEE, MEDIAL MENISECTOMY, CHONDROPASTY AND SYNOVECTOMY performed by Arabella Andres DO at [address] Right        Allergies   Allergen Reactions    Sulfa Antibiotics Itching    Vancomycin Itching    Penicillins Rash       Social History     Socioeconomic History    Marital status:      Spouse name: None    Number of children: 2    Years of education: None    Highest education level: None   Occupational History     Employer: [employer]   Tobacco Use    Smoking status: Never Smoker    Smokeless tobacco: Never Used   Vaping Use    Vaping Use: Never used   Substance and Sexual Activity    Alcohol use: Yes     Comment: occas    Drug use: No    Sexual activity: None   Other Topics Concern    None   Social History Narrative    None     Social Determinants of Health     Financial Resource Strain: Low Risk     Difficulty of Paying Living Expenses: Not hard at all   Food Insecurity: No Food Insecurity    Worried About Running Out of Food in the Last Year: Never true    Virgilio of Food in the Last Year: Never true   Transportation Needs:     Lack of Transportation (Medical): Not on file    Lack of Transportation (Non-Medical): Not on file   Physical Activity:     Days of Exercise per Week: Not on file    Minutes of Exercise per Session: Not on file   Stress:     Feeling of Stress : Not on file   Social Connections:     Frequency of Communication with Friends and Family: Not on file    Frequency of Social Gatherings with Friends and Family: Not on file    Attends Pentecostal Services: Not on file    Active Member of [address] [place] [address] [employer] or Organizations: Not on file    Attends Club or Organization Meetings: Not on file    Marital Status: Not on file   Intimate Partner Violence:     Fear of Current or Ex-Partner: Not on file    Emotionally Abused: Not on file    Physically Abused: Not on file    Sexually Abused: Not on file   Housing Stability:     Unable to Pay for Housing in the Last Year: Not on file    Number of [place] in the Last Year: Not on file    Unstable Housing in the Last Year: Not on file       Review of Systems  As follows except as previously noted in HPI:  Constitutional: Negative for chills, diaphoresis, fatigue, fever and unexpected weight change. Respiratory: Negative for cough, shortness of breath and wheezing. Cardiovascular: Negative for chest pain and palpitations. Neurological: Negative for dizziness, syncope, cephalgia. GI / : negative  Musculoskeletal: see HPI       Objective:   Physical Exam   Constitutional: Oriented to person, place, and time. and appears well-developed and well-nourished. :   Head: Normocephalic and atraumatic. Eyes: EOM are normal.   Neck: Neck supple. Cardiovascular: Normal rate and regular rhythm. Pulmonary/Chest: Effort normal. No stridor. No respiratory distress, no wheezes. Abdominal:  No abnormal distension. Neurological: Alert and oriented to person, place, and time. Skin: Skin is warm and dry. Psychiatric: Normal mood and affect.  Behavior is normal. Thought content normal.    EMANUEL Calvin Reece Rose DO    5/4/22  10:03 AM

## 2022-06-06 ENCOUNTER — OFFICE VISIT (OUTPATIENT)
Dept: ORTHOPEDIC SURGERY | Age: 57
End: 2022-06-06
Payer: COMMERCIAL

## 2022-06-06 VITALS — HEIGHT: 67 IN | TEMPERATURE: 98 F | BODY MASS INDEX: 35 KG/M2 | WEIGHT: 223 LBS

## 2022-06-06 DIAGNOSIS — M94.261 CHONDROMALACIA, RIGHT KNEE: ICD-10-CM

## 2022-06-06 DIAGNOSIS — M85.651 BONE CYST OF RIGHT FEMUR: ICD-10-CM

## 2022-06-06 DIAGNOSIS — M65.9 SYNOVITIS OF RIGHT KNEE: Primary | ICD-10-CM

## 2022-06-06 PROCEDURE — G8427 DOCREV CUR MEDS BY ELIG CLIN: HCPCS | Performed by: ORTHOPAEDIC SURGERY

## 2022-06-06 PROCEDURE — G8417 CALC BMI ABV UP PARAM F/U: HCPCS | Performed by: ORTHOPAEDIC SURGERY

## 2022-06-06 PROCEDURE — 3017F COLORECTAL CA SCREEN DOC REV: CPT | Performed by: ORTHOPAEDIC SURGERY

## 2022-06-06 PROCEDURE — 1036F TOBACCO NON-USER: CPT | Performed by: ORTHOPAEDIC SURGERY

## 2022-06-06 PROCEDURE — 99213 OFFICE O/P EST LOW 20 MIN: CPT | Performed by: ORTHOPAEDIC SURGERY

## 2022-06-06 NOTE — PROGRESS NOTES
Chief Complaint:   Chief Complaint   Patient presents with    Knee Pain     Right knee pain follow up. Would like to discuss surgery now for the right knee. Left knee now starting to bother her as well. Brenda Speaks is of her right knee problems. Apparently the knee is gotten worse. She has been more active as weather is gotten better and the knee hurts her more. She quit in physical therapy because a combination of her increasing activity and the therapy was too much for her and she want to say therapy in case she had surgery on the knee. She is now wanting to have the knee replaced there with the pain which has been persistent. Allergies; medications; past medical, surgical, family, and social history; and problem list have been reviewed today and updated as indicated in this encounter seen below. Exam: Range of motion of the right knee is good. Some medial gapping and with Ferdinand testing there is pain and also crepitus on the medial side in the area which shows significant changes on the MRI. Radiographs: Ancient x-rays and a more recent MRI were reviewed which showed the fluid in the medial femoral and tibial condyles. Is also a cyst in the proximal tibia. There is significant wear in the patellofemoral articulation. ASSESSMENT:    Mack Christensen was seen today for knee pain. Diagnoses and all orders for this visit:    Synovitis of right knee    Bone cyst of right femur    Chondromalacia, right knee        PLAN: Discussed knee replacement arthroplasty and reviewed the surgery, risk, prognosis, limitations and rehab issues in detail with parent good understanding. Lauren's at the point where she wants to get rid of the pain. We will schedule knee replacement in [facility].    No follow-ups on file.        Current Outpatient Medications   Medication Sig Dispense Refill    levothyroxine (SYNTHROID) 50 MCG tablet Take 1 tablet by mouth daily 30 tablet 1    EPINEPHrine (EPIPEN 2-YURIY) 0.3 MG/0.3ML SOAJ injection Inject 0.3 mLs into the muscle once for 1 dose Use as directed for allergic reaction 0.3 mL 2    venlafaxine (EFFEXOR XR) 37.5 MG extended release capsule Take 112.5 mg by mouth daily       albuterol sulfate  (90 Base) MCG/ACT inhaler Inhale 2 puffs into the lungs 4 times daily as needed for Wheezing 3 each 1    budesonide-formoterol (SYMBICORT) 160-4.5 MCG/ACT AERO Inhale 2 puffs into the lungs 2 times daily 3 each 1    pantoprazole (PROTONIX) 40 MG tablet Take 40 mg by mouth daily       lactulose (CHRONULAC) 10 GM/15ML solution TAKE 15ML BY MOUTH ONCE TO TWICE DAILY AS DIRECTED      VITAMIN D PO Take 15,000 mg by mouth daily      zinc 50 MG TABS tablet Take 50 mg by mouth daily      NONFORMULARY Take 1 capsule by mouth daily Immune Enhancer      diazepam (VALIUM) 5 MG tablet Take 5 mg by mouth nightly as needed for Anxiety.  PREBIOTIC PRODUCT PO Take by mouth daily       Probiotic Product (PROBIOTIC DAILY PO) Take by mouth daily       traZODone (DESYREL) 100 MG tablet Take 100 mg by mouth nightly       Omega-3 Fatty Acids (FISH OIL) 1200 MG CPDR Take 1 tablet by mouth 2 times daily 2 caps  Stopped 5 days pre op      venlafaxine (EFFEXOR XR) 150 MG extended release capsule Take 150 mg by mouth nightly        No current facility-administered medications for this visit.        Patient Active Problem List   Diagnosis    Vitamin D deficiency    Dyslipidemia    Mild intermittent asthma without complication    Bee allergy status    Chronic pain syndrome    Lumbar facet arthropathy    Lumbar disc disorder    JOSÉ MIGUEL (obstructive sleep apnea)    Esophagitis    Derangement of posterior horn of medial meniscus due to old tear or injury, left knee    Chondromalacia, left knee    Synovitis of left knee    Anxiety and depression       Past Medical History:   Diagnosis Date    Anxiety     Asthma     Chávez's esophagus     Depression     GERD (gastroesophageal reflux disease)     H/O cardiovascular stress test 04/01/2022    Exercise Treadmill Stress    Sleep apnea     uses cpap    Thyroid disease     newly dx hypothyroid  (no meds ordered yet)       Past Surgical History:   Procedure Laterality Date    ANKLE SURGERY Left     APPENDECTOMY  1986    BREAST SURGERY      bx x2 left neg    CHOLECYSTECTOMY  1993    COLONOSCOPY      ENDOSCOPY, COLON, DIAGNOSTIC      HAND SURGERY Left     HYSTERECTOMY      KNEE ARTHROSCOPY Left 06/11/2020    Arthroscopic Exam and Treatment of left knee    KNEE ARTHROSCOPY Left 6/11/2020    ARTHROSCOPIC EXAM AND TREATMENT LEFT KNEE, MEDIAL MENISECTOMY, CHONDROPASTY AND SYNOVECTOMY performed by Saul Álvarez DO at [address] Right        Allergies   Allergen Reactions    Sulfa Antibiotics Itching    Vancomycin Itching    Penicillins Rash       Social History     Socioeconomic History    Marital status:      Spouse name: None    Number of children: 2    Years of education: None    Highest education level: None   Occupational History     Employer: [employer]   Tobacco Use    Smoking status: Never Smoker    Smokeless tobacco: Never Used   Vaping Use    Vaping Use: Never used   Substance and Sexual Activity    Alcohol use: Yes     Comment: occas    Drug use: No    Sexual activity: None   Other Topics Concern    None   Social History Narrative    None     Social Determinants of Health     Financial Resource Strain: Low Risk     Difficulty of Paying Living Expenses: Not hard at all   Food Insecurity: No Food Insecurity    Worried About Running Out of Food in the Last Year: Never true    Virgilio of Food in the Last Year: Never true   Transportation Needs:     Lack of Transportation (Medical): Not on file    Lack of Transportation (Non-Medical):  Not on file   Physical Activity:     Days of Exercise per Week: Not on file    Minutes of Exercise per Session: Not on file   Stress:     Feeling of Stress : Not on file   Social Connections:     Frequency of Communication with Friends and Family: Not on file    Frequency of Social Gatherings with Friends and Family: Not on file    Attends Adventism Services: Not on file    Active Member of Clubs or Organizations: Not on file    Attends Club or Organization Meetings: Not on file    Marital Status: Not on file   Intimate Partner Violence:     Fear of Current or Ex-Partner: Not on file    Emotionally Abused: Not on file    Physically Abused: Not on file    Sexually Abused: Not on file   Housing Stability:     Unable to Pay for Housing in the Last Year: Not on file    Number of [place] in the Last Year: Not on file    Unstable Housing in the Last Year: Not on file       Review of Systems  As follows except as previously noted in HPI:  Constitutional: Negative for chills, diaphoresis, fatigue, fever and unexpected weight change. Respiratory: Negative for cough, shortness of breath and wheezing. Cardiovascular: Negative for chest pain and palpitations. Neurological: Negative for dizziness, syncope, cephalgia. GI / : negative  Musculoskeletal: see HPI       Objective:   Physical Exam   Constitutional: Oriented to person, place, and time. and appears well-developed and well-nourished. :   Head: Normocephalic and atraumatic. Eyes: EOM are normal.   Neck: Neck supple. Cardiovascular: Normal rate and regular rhythm. Pulmonary/Chest: Effort normal. No stridor. No respiratory distress, no wheezes. Abdominal:  No abnormal distension. Neurological: Alert and oriented to person, place, and time. Skin: Skin is warm and dry. Psychiatric: Normal mood and affect.  Behavior is normal. Thought content normal.    EMANUEL Guerra DO    6/6/22  4:27 PM

## 2022-06-07 ENCOUNTER — TELEPHONE (OUTPATIENT)
Dept: ORTHOPEDIC SURGERY | Age: 57
End: 2022-06-07

## 2022-06-07 NOTE — TELEPHONE ENCOUNTER
Prior Authorization Form:      DEMOGRAPHICS:                     Patient Name:  Jacques Arteaga  Patient :  1965            Insurance:  Payor: Teresita Challenger / [facility] / Product Type: *No Product type* /   Insurance ID Number:    Payor/Plan Subscr  Sex Relation Sub.  Ins. ID Effective Group Num   1. [address] [facility] 1967 Male Spouse [id number]                                   [id number]         DIAGNOSIS & PROCEDURE:                       Procedure/Operation: TOTAL RIGHT KNEE REPLACEMENT ARTHROPLASTY           CPT Code: 11551    Diagnosis:  SYNOVITIS RIGHT KNEE, BONE CYST RIGHT FEMUR    ICD10 Code: M65.9, M85.651    Location:  [address] [place] [address]    Surgeon:  Macario Lala D.O.    SCHEDULING INFORMATION:                          Date: 2022   Time: TBA              Anesthesia:  General                                                       Status:  Outpatient        Special Comments:  Margaret Rebolledo 13.       Electronically signed by Celestine Foley on 2022 at 8:09 AM

## 2022-06-27 ENCOUNTER — OFFICE VISIT (OUTPATIENT)
Dept: ORTHOPEDIC SURGERY | Age: 57
End: 2022-06-27
Payer: COMMERCIAL

## 2022-06-27 VITALS — HEIGHT: 67 IN | WEIGHT: 223 LBS | BODY MASS INDEX: 35 KG/M2

## 2022-06-27 DIAGNOSIS — S83.92XA SPRAIN OF LEFT KNEE/LEG, INITIAL ENCOUNTER: Primary | ICD-10-CM

## 2022-06-27 DIAGNOSIS — M25.562 LEFT KNEE PAIN, UNSPECIFIED CHRONICITY: Primary | ICD-10-CM

## 2022-06-27 DIAGNOSIS — E03.9 ACQUIRED HYPOTHYROIDISM: ICD-10-CM

## 2022-06-27 LAB
T4 FREE: 1.14 NG/DL (ref 0.93–1.7)
TSH SERPL DL<=0.05 MIU/L-ACNC: 2.63 UIU/ML (ref 0.27–4.2)

## 2022-06-27 PROCEDURE — 99213 OFFICE O/P EST LOW 20 MIN: CPT | Performed by: ORTHOPAEDIC SURGERY

## 2022-06-27 PROCEDURE — 3017F COLORECTAL CA SCREEN DOC REV: CPT | Performed by: ORTHOPAEDIC SURGERY

## 2022-06-27 PROCEDURE — G8417 CALC BMI ABV UP PARAM F/U: HCPCS | Performed by: ORTHOPAEDIC SURGERY

## 2022-06-27 PROCEDURE — G8427 DOCREV CUR MEDS BY ELIG CLIN: HCPCS | Performed by: ORTHOPAEDIC SURGERY

## 2022-06-27 PROCEDURE — 1036F TOBACCO NON-USER: CPT | Performed by: ORTHOPAEDIC SURGERY

## 2022-06-27 NOTE — PROGRESS NOTES
Chief Complaint:   Chief Complaint   Patient presents with    Knee Pain     Left knee pain. Tripped over the dog about a week ago. Scheduled for Right TKA but left is now more painful. Kashif Lamar complains of left knee pain. There is a recent onset after her dog ran into her left leg and caused her to twist.  Some discomfort particularly on the medial side. She is walking with a cane now. She had a little bit of discomfort because she is favoring the right leg which she is going to have replaced in July. Allergies; medications; past medical, surgical, family, and social history; and problem list have been reviewed today and updated as indicated in this encounter seen below. Exam: There is no discoloration of the left leg. The knee is not grossly effused. She does guard range of motion and also is favoring a little bit with ambulation. Motion is good in the left knee going 0 to 105 degrees or more flexion. She has stable collateral cruciate ligaments. There are no specific signs of meniscal pathology. There is a little crepitus throughout the range of motion. Radiographs: Imaging of the left knee 2 views including AP weightbearing films shows slight varus malalignment with narrowing of the medial joint compartment with minimal atrophic changes noted. ASSESSMENT:    Jessica Hess was seen today for knee pain. Diagnoses and all orders for this visit:    Sprain of left knee/leg, initial encounter        PLAN: We discussed using a supportive knee sleeve also work on range of motion exercises. This should improve as should comfort with decrease in any intra-articular edema. There is no aggressive treatment indicated at this time. No follow-ups on file.        Current Outpatient Medications   Medication Sig Dispense Refill    levothyroxine (SYNTHROID) 50 MCG tablet Take 1 tablet by mouth daily 30 tablet 1    EPINEPHrine (EPIPEN 2-YURIY) 0.3 MG/0.3ML SOAJ injection Inject 0.3 mLs into the muscle once for 1 dose Use as directed for allergic reaction 0.3 mL 2    venlafaxine (EFFEXOR XR) 37.5 MG extended release capsule Take 112.5 mg by mouth daily       albuterol sulfate  (90 Base) MCG/ACT inhaler Inhale 2 puffs into the lungs 4 times daily as needed for Wheezing 3 each 1    budesonide-formoterol (SYMBICORT) 160-4.5 MCG/ACT AERO Inhale 2 puffs into the lungs 2 times daily 3 each 1    pantoprazole (PROTONIX) 40 MG tablet Take 40 mg by mouth daily       lactulose (CHRONULAC) 10 GM/15ML solution TAKE 15ML BY MOUTH ONCE TO TWICE DAILY AS DIRECTED      VITAMIN D PO Take 15,000 mg by mouth daily      zinc 50 MG TABS tablet Take 50 mg by mouth daily      NONFORMULARY Take 1 capsule by mouth daily Immune Enhancer      diazepam (VALIUM) 5 MG tablet Take 5 mg by mouth nightly as needed for Anxiety.  PREBIOTIC PRODUCT PO Take by mouth daily       Probiotic Product (PROBIOTIC DAILY PO) Take by mouth daily       traZODone (DESYREL) 100 MG tablet Take 100 mg by mouth nightly       Omega-3 Fatty Acids (FISH OIL) 1200 MG CPDR Take 1 tablet by mouth 2 times daily 2 caps  Stopped 5 days pre op      venlafaxine (EFFEXOR XR) 150 MG extended release capsule Take 150 mg by mouth nightly        No current facility-administered medications for this visit.        Patient Active Problem List   Diagnosis    Vitamin D deficiency    Dyslipidemia    Mild intermittent asthma without complication    Bee allergy status    Chronic pain syndrome    Lumbar facet arthropathy    Lumbar disc disorder    JOSÉ MIGUEL (obstructive sleep apnea)    Esophagitis    Derangement of posterior horn of medial meniscus due to old tear or injury, left knee    Chondromalacia, left knee    Synovitis of left knee    Anxiety and depression       Past Medical History:   Diagnosis Date    Anxiety     Asthma     Chávez's esophagus     Depression     GERD (gastroesophageal reflux disease)     H/O cardiovascular stress test 04/01/2022    Exercise Treadmill Stress    Sleep apnea     uses cpap    Thyroid disease     newly dx hypothyroid  (no meds ordered yet)       Past Surgical History:   Procedure Laterality Date    ANKLE SURGERY Left     APPENDECTOMY  1986    BREAST SURGERY      bx x2 left neg    CHOLECYSTECTOMY  1993    COLONOSCOPY      ENDOSCOPY, COLON, DIAGNOSTIC      HAND SURGERY Left     HYSTERECTOMY (CERVIX STATUS UNKNOWN)      KNEE ARTHROSCOPY Left 06/11/2020    Arthroscopic Exam and Treatment of left knee    KNEE ARTHROSCOPY Left 6/11/2020    ARTHROSCOPIC EXAM AND TREATMENT LEFT KNEE, MEDIAL MENISECTOMY, CHONDROPASTY AND SYNOVECTOMY performed by Maximo Meckel, DO at [address] [facility]        Allergies   Allergen Reactions    Sulfa Antibiotics Itching    Vancomycin Itching    Penicillins Rash       Social History     Socioeconomic History    Marital status:      Spouse name: None    Number of children: 2    Years of education: None    Highest education level: None   Occupational History     Employer: [employer]    Tobacco Use    Smoking status: Never Smoker    Smokeless tobacco: Never Used   Vaping Use    Vaping Use: Never used   Substance and Sexual Activity    Alcohol use: Yes     Comment: occas    Drug use: No    Sexual activity: None   Other Topics Concern    None   Social History Narrative    None     Social Determinants of Health     Financial Resource Strain: Low Risk     Difficulty of Paying Living Expenses: Not hard at all   Food Insecurity: No Food Insecurity    Worried About Running Out of Food in the Last Year: Never true    Virgilio of Food in the Last Year: Never true   Transportation Needs:     Lack of Transportation (Medical): Not on file    Lack of Transportation (Non-Medical):  Not on file   Physical Activity:     Days of Exercise per Week: Not on file    Minutes of Exercise per Session: Not on file   Stress:     Feeling of Stress : Not on file   Social Connections:     Frequency of Communication with Friends and Family: Not on file    Frequency of Social Gatherings with Friends and Family: Not on file    Attends Mu-ism Services: Not on file    Active Member of Clubs or Organizations: Not on file    Attends Club or Organization Meetings: Not on file    Marital Status: Not on file   Intimate Partner Violence:     Fear of Current or Ex-Partner: Not on file    Emotionally Abused: Not on file    Physically Abused: Not on file    Sexually Abused: Not on file   Housing Stability:     Unable to Pay for Housing in the Last Year: Not on file    Number of [place] in the Last Year: Not on file    Unstable Housing in the Last Year: Not on file       Review of Systems  As follows except as previously noted in HPI:  Constitutional: Negative for chills, diaphoresis, fatigue, fever and unexpected weight change. Respiratory: Negative for cough, shortness of breath and wheezing. Cardiovascular: Negative for chest pain and palpitations. Neurological: Negative for dizziness, syncope, cephalgia. GI / : negative  Musculoskeletal: see HPI       Objective:   Physical Exam   Constitutional: Oriented to person, place, and time. and appears well-developed and well-nourished. :   Head: Normocephalic and atraumatic. Eyes: EOM are normal.   Neck: Neck supple. Cardiovascular: Normal rate and regular rhythm. Pulmonary/Chest: Effort normal. No stridor. No respiratory distress, no wheezes. Abdominal:  No abnormal distension. Neurological: Alert and oriented to person, place, and time. Skin: Skin is warm and dry. Psychiatric: Normal mood and affect.  Behavior is normal. Thought content normal.    EMANUEL Bernal DO    6/27/22  2:37 PM

## 2022-07-05 RX ORDER — SODIUM CHLORIDE, SODIUM LACTATE, POTASSIUM CHLORIDE, CALCIUM CHLORIDE 600; 310; 30; 20 MG/100ML; MG/100ML; MG/100ML; MG/100ML
INJECTION, SOLUTION INTRAVENOUS CONTINUOUS
Status: CANCELLED | OUTPATIENT
Start: 2022-07-05

## 2022-07-07 ENCOUNTER — HOSPITAL ENCOUNTER (OUTPATIENT)
Dept: PREADMISSION TESTING | Age: 57
Discharge: HOME | End: 2022-07-07
Payer: COMMERCIAL

## 2022-07-07 ENCOUNTER — ANESTHESIA EVENT (OUTPATIENT)
Dept: OPERATING ROOM | Age: 57
End: 2022-07-07
Payer: COMMERCIAL

## 2022-07-07 VITALS
DIASTOLIC BLOOD PRESSURE: 70 MMHG | HEIGHT: 67 IN | WEIGHT: 225 LBS | OXYGEN SATURATION: 97 % | SYSTOLIC BLOOD PRESSURE: 120 MMHG | BODY MASS INDEX: 35.31 KG/M2 | RESPIRATION RATE: 18 BRPM | HEART RATE: 82 BPM | TEMPERATURE: 97.6 F

## 2022-07-07 DIAGNOSIS — M17.11 OSTEOARTHRITIS OF RIGHT KNEE, UNSPECIFIED OSTEOARTHRITIS TYPE: Primary | ICD-10-CM

## 2022-07-07 LAB
ALBUMIN SERPL-MCNC: 4.5 G/DL (ref 3.5–5.2)
ALP BLD-CCNC: 90 U/L (ref 35–104)
ALT SERPL-CCNC: 28 U/L (ref 0–32)
ANION GAP SERPL CALCULATED.3IONS-SCNC: 10 MMOL/L (ref 7–16)
APTT: 27.5 SEC (ref 24.5–35.1)
AST SERPL-CCNC: 21 U/L (ref 0–31)
BASOPHILS ABSOLUTE: 0.03 E9/L (ref 0–0.2)
BASOPHILS RELATIVE PERCENT: 0.5 % (ref 0–2)
BILIRUB SERPL-MCNC: 0.2 MG/DL (ref 0–1.2)
BILIRUBIN URINE: NEGATIVE
BLOOD, URINE: NEGATIVE
BUN BLDV-MCNC: 19 MG/DL (ref 6–20)
CALCIUM SERPL-MCNC: 9.2 MG/DL (ref 8.6–10.2)
CHLORIDE BLD-SCNC: 104 MMOL/L (ref 98–107)
CLARITY: CLEAR
CO2: 27 MMOL/L (ref 22–29)
COLOR: YELLOW
CREAT SERPL-MCNC: 0.9 MG/DL (ref 0.5–1)
EOSINOPHILS ABSOLUTE: 0.13 E9/L (ref 0.05–0.5)
EOSINOPHILS RELATIVE PERCENT: 2.4 % (ref 0–6)
GFR AFRICAN AMERICAN: >60
GFR NON-AFRICAN AMERICAN: >60 ML/MIN/1.73
GLUCOSE BLD-MCNC: 127 MG/DL (ref 74–99)
GLUCOSE URINE: NEGATIVE MG/DL
HBA1C MFR BLD: 5.9 % (ref 4–5.6)
HCT VFR BLD CALC: 41 % (ref 34–48)
HEMOGLOBIN: 13.4 G/DL (ref 11.5–15.5)
IMMATURE GRANULOCYTES #: 0.01 E9/L
IMMATURE GRANULOCYTES %: 0.2 % (ref 0–5)
INR BLD: 0.9
KETONES, URINE: NEGATIVE MG/DL
LEUKOCYTE ESTERASE, URINE: NEGATIVE
LYMPHOCYTES ABSOLUTE: 1.62 E9/L (ref 1.5–4)
LYMPHOCYTES RELATIVE PERCENT: 29.4 % (ref 20–42)
MCH RBC QN AUTO: 29.1 PG (ref 26–35)
MCHC RBC AUTO-ENTMCNC: 32.7 % (ref 32–34.5)
MCV RBC AUTO: 89.1 FL (ref 80–99.9)
MONOCYTES ABSOLUTE: 0.37 E9/L (ref 0.1–0.95)
MONOCYTES RELATIVE PERCENT: 6.7 % (ref 2–12)
NEUTROPHILS ABSOLUTE: 3.35 E9/L (ref 1.8–7.3)
NEUTROPHILS RELATIVE PERCENT: 60.8 % (ref 43–80)
NITRITE, URINE: NEGATIVE
PDW BLD-RTO: 12.5 FL (ref 11.5–15)
PH UA: 6 (ref 5–9)
PLATELET # BLD: 218 E9/L (ref 130–450)
PMV BLD AUTO: 10.7 FL (ref 7–12)
POTASSIUM SERPL-SCNC: 4.5 MMOL/L (ref 3.5–5)
PREALBUMIN: 24 MG/DL (ref 20–40)
PROTEIN UA: NEGATIVE MG/DL
PROTHROMBIN TIME: 10.7 SEC (ref 9.3–12.4)
RBC # BLD: 4.6 E12/L (ref 3.5–5.5)
SODIUM BLD-SCNC: 141 MMOL/L (ref 132–146)
SPECIFIC GRAVITY UA: 1.02 (ref 1–1.03)
TOTAL PROTEIN: 7.1 G/DL (ref 6.4–8.3)
UROBILINOGEN, URINE: 0.2 E.U./DL
WBC # BLD: 5.5 E9/L (ref 4.5–11.5)

## 2022-07-07 PROCEDURE — 87081 CULTURE SCREEN ONLY: CPT

## 2022-07-07 PROCEDURE — 81003 URINALYSIS AUTO W/O SCOPE: CPT

## 2022-07-07 PROCEDURE — 83036 HEMOGLOBIN GLYCOSYLATED A1C: CPT

## 2022-07-07 PROCEDURE — 80053 COMPREHEN METABOLIC PANEL: CPT

## 2022-07-07 PROCEDURE — 85730 THROMBOPLASTIN TIME PARTIAL: CPT

## 2022-07-07 PROCEDURE — 85610 PROTHROMBIN TIME: CPT

## 2022-07-07 PROCEDURE — 84134 ASSAY OF PREALBUMIN: CPT

## 2022-07-07 PROCEDURE — 36415 COLL VENOUS BLD VENIPUNCTURE: CPT

## 2022-07-07 PROCEDURE — 85025 COMPLETE CBC W/AUTO DIFF WBC: CPT

## 2022-07-07 PROCEDURE — 87088 URINE BACTERIA CULTURE: CPT

## 2022-07-07 RX ORDER — ROPIVACAINE HYDROCHLORIDE 5 MG/ML
30 INJECTION, SOLUTION EPIDURAL; INFILTRATION; PERINEURAL ONCE
Status: CANCELLED | OUTPATIENT
Start: 2022-07-07 | End: 2022-07-07

## 2022-07-07 RX ORDER — FENTANYL CITRATE 50 UG/ML
50 INJECTION, SOLUTION INTRAMUSCULAR; INTRAVENOUS EVERY 5 MIN PRN
Status: CANCELLED | OUTPATIENT
Start: 2022-07-07

## 2022-07-07 RX ORDER — MIDAZOLAM HYDROCHLORIDE 1 MG/ML
1 INJECTION INTRAMUSCULAR; INTRAVENOUS EVERY 5 MIN PRN
Status: CANCELLED | OUTPATIENT
Start: 2022-07-07

## 2022-07-07 ASSESSMENT — PAIN DESCRIPTION - ORIENTATION: ORIENTATION: RIGHT

## 2022-07-07 ASSESSMENT — PAIN DESCRIPTION - DESCRIPTORS: DESCRIPTORS: ACHING;DISCOMFORT;STABBING

## 2022-07-07 ASSESSMENT — PAIN SCALES - GENERAL: PAINLEVEL_OUTOF10: 6

## 2022-07-07 ASSESSMENT — LIFESTYLE VARIABLES: SMOKING_STATUS: 0

## 2022-07-07 ASSESSMENT — PAIN DESCRIPTION - LOCATION: LOCATION: KNEE

## 2022-07-07 NOTE — PROGRESS NOTES
[address] [facility]                                                                                                                    PRE OP INSTRUCTIONS FOR  Jordyn Mustafa        Date: 7/7/2022    Date of surgery: 7-19-22   Arrival Time: Hospital will call you between 5pm and 7pm on 7-18-22 with your final arrival time for surgery    1. Do not eat or drink anything after midnight prior to surgery. This includes no water, chewing gum, mints or ice chips Please follow Dr. Danielle Shoemaker office instructions regarding drinking clear liquids until 2 hours before surgery time     2. Take the following medications with a small sip of water on the morning of Surgery: use and bring inhalers, synthroid, protonix      3. Diabetics may take evening dose of insulin but none after midnight. If you feel symptomatic or low blood sugar morning of surgery drink 1-2 ounces of apple juice only. 4. Aspirin, Ibuprofen, Advil, Naproxen, Vitamin E and other Anti-inflammatory products should be stopped  before surgery  as directed by your physician. Take Tylenol only unless instructed otherwise by your surgeon. 5. Check with your Doctor regarding stopping Plavix, Coumadin, Lovenox, Eliquis, Effient, or other blood thinners. 6. Do not smoke,use illicit drugs and do not drink any alcoholic beverages 24 hours prior to surgery. 7. You may brush your teeth the morning of surgery. DO NOT SWALLOW WATER    8. You MUST make arrangements for a responsible adult to take you home after your surgery. You will not be allowed to leave alone or drive yourself home. It is strongly suggested someone stay with you the first 24 hrs. Your surgery will be cancelled if you do not have a ride home. 9. PEDIATRIC PATIENTS ONLY:  A parent/legal guardian must accompany a child scheduled for surgery and plan to stay at the hospital until the child is discharged. Please do not bring other children with you.     10. Please wear simple, loose fitting clothing to the hospital.  Thea Skeans not bring valuables (money, credit cards, checkbooks, etc.) Do not wear any makeup (including no eye makeup) or nail polish on your fingers or toes. 11. DO NOT wear any jewelry or piercings on day of surgery. All body piercing jewelry must be removed. 12. Shower the night before surgery with _x__Antibacterial soap /BEE WIPES____x____    13. TOTAL JOINT REPLACEMENT/HYSTERECTOMY PATIENTS ONLY---Remember to bring Blood Bank bracelet to the hospital on the day of surgery. 14. If you have a Living Will and Durable Power of  for Healthcare, please bring in a copy. 15. If appropriate bring crutches, inspirex, WALKER, CANE etc... 12. Notify your Surgeon if you develop any illness between now and surgery time, cough, cold, fever, sore throat, nausea, vomiting, etc.  Please notify your surgeon if you experience dizziness, shortness of breath or blurred vision between now & the time of your surgery. 17. If you have ___dentures, they will be removed before going to the OR; we will provide you a container. If you wear ___contact lenses or _x__glasses, they will be removed; please bring a case for them. 18. To provide excellent care visitors will be limited to 2 in the room at any given time. 19. Please bring picture ID and insurance card. 20. Sleep apnea patients need to bring CPAP AND SETTINGS to hospital on day of surgery. 21. During flu season no [address] under the age of [address] are permitted in the hospital for the safety of all patients. 22. Other walk in through visitors entrance and check in at front lobby registration desk                 Please call AMBULATORY CARE if you have any further questions.    [address]     [address]

## 2022-07-07 NOTE — ANESTHESIA PRE PROCEDURE
Department of Anesthesiology  Preprocedure Note       Name:  Annie Richardson   Age:  55 y.o.  :  1965                                          MRN:  [id number]         Date:  2022      Surgeon: Elton Calvert):  EMANUEL Faria DO    Procedure: Procedure(s):  TOTAL RIGHT KNEE REPLACEMENT  ARTHROPLASTY (MARJORIE)    Medications prior to admission:   Prior to Admission medications    Medication Sig Start Date End Date Taking? Authorizing Provider   levothyroxine (SYNTHROID) 50 MCG tablet TAKE 1 TABLET BY MOUTH DAILY 22   Brittaney Nicole DO   NONFORMULARY D Pam - otc to prevent UTIs    Historical Provider, MD   EPINEPHrine (EPIPEN 2-YURIY) 0.3 MG/0.3ML SOAJ injection Inject 0.3 mLs into the muscle once for 1 dose Use as directed for allergic reaction 4/15/22 7/5/22  Jass Bui DO   albuterol sulfate  (90 Base) MCG/ACT inhaler Inhale 2 puffs into the lungs 4 times daily as needed for Wheezing 10/19/21   Brittaney Nicole DO   budesonide-formoterol (SYMBICORT) 160-4.5 MCG/ACT AERO Inhale 2 puffs into the lungs 2 times daily  Patient taking differently: Inhale 2 puffs into the lungs 2 times daily as needed  10/19/21   Jass Bui DO   pantoprazole (PROTONIX) 40 MG tablet Take 40 mg by mouth daily  21   Historical Provider, MD   lactulose (CHRONULAC) 10 GM/15ML solution Take 10 g by mouth daily  21   Historical Provider, MD   VITAMIN D PO Take 15,000 mg by mouth daily    Historical Provider, MD   diazepam (VALIUM) 5 MG tablet Take 5 mg by mouth nightly as needed for Anxiety.     Historical Provider, MD   PREBIOTIC PRODUCT PO Take by mouth daily     Historical Provider, MD   Probiotic Product (PROBIOTIC DAILY PO) Take by mouth daily     Historical Provider, MD   traZODone (DESYREL) 100 MG tablet Take 100 mg by mouth nightly     Historical Provider, MD   venlafaxine (EFFEXOR XR) 150 MG extended release capsule Take 150 mg by mouth nightly  12   Historical Provider, MD Current medications:    Current Facility-Administered Medications   Medication Dose Route Frequency Provider Last Rate Last Admin    acetaminophen (TYLENOL) tablet 1,000 mg  1,000 mg Oral Once K Caren Sergeant, DO        celecoxib (CELEBREX) capsule 200 mg  200 mg Oral Once K Caren Sergeant, DO        dexamethasone (DECADRON) injection 8 mg  8 mg IntraVENous Once K Caren Sergeant, DO        sodium chloride flush 0.9 % injection 5-40 mL  5-40 mL IntraVENous 2 times per day K Caren Sergeant, DO        sodium chloride flush 0.9 % injection 5-40 mL  5-40 mL IntraVENous PRN K Caren Sergeant, DO        0.9 % sodium chloride infusion   IntraVENous PRN K Caren Sergeant, DO        ceFAZolin (ANCEF) 2,000 mg in sterile water 20 mL IV syringe  2,000 mg IntraVENous On Call to [address], DO        lactated ringers infusion   IntraVENous Continuous Cesar Felipe MD 10 mL/hr at 07/19/22 0657 New Bag at 07/19/22 0657    fentaNYL (SUBLIMAZE) injection 50 mcg  50 mcg IntraVENous Q5 Min PRN Giana Hampton MD        midazolam (VERSED) injection 1 mg  1 mg IntraVENous Q5 Min PRN Giana Hampton MD        ropivacaine (NAROPIN) 0.5% injection 30 mL  30 mL Infiltration Once Giana Hampton MD           Allergies: Allergies   Allergen Reactions    Sulfa Antibiotics Itching    Vancomycin Itching    Penicillins Rash       Problem List:    Patient Active Problem List   Diagnosis Code    Vitamin D deficiency E55.9    Dyslipidemia E78.5    Mild intermittent asthma without complication R87.15    Bee allergy status Z91.030    Chronic pain syndrome G89.4    Lumbar facet arthropathy M47.816    Lumbar disc disorder M51.9    JOSÉ MIGUEL (obstructive sleep apnea) G47.33    Esophagitis K20.90    Derangement of posterior horn of medial meniscus due to old tear or injury, left knee M23.222    Chondromalacia, left knee M94.262    Synovitis of left knee M65.9    Anxiety and depression F41.9, F32. A       Past Medical History: Diagnosis Date    Anxiety     Asthma     controlled     Chávez's esophagus     Depression     GERD (gastroesophageal reflux disease)     H/O cardiovascular stress test 04/01/2022    Exercise Treadmill Stress    Sleep apnea     uses cpap    Thyroid disease     newly dx hypothyroid  (no meds ordered yet)       Past Surgical History:        Procedure Laterality Date    ANKLE SURGERY Left     APPENDECTOMY  1986    BREAST SURGERY      bx x2 left neg    CHOLECYSTECTOMY  1993    COLONOSCOPY      ENDOSCOPY, COLON, DIAGNOSTIC      HAND SURGERY Left     HYSTERECTOMY (CERVIX STATUS UNKNOWN)      KNEE ARTHROSCOPY Left 06/11/2020    Arthroscopic Exam and Treatment of left knee    KNEE ARTHROSCOPY Left 6/11/2020    ARTHROSCOPIC EXAM AND TREATMENT LEFT KNEE, MEDIAL MENISECTOMY, CHONDROPASTY AND SYNOVECTOMY performed by Osorio Cristina DO at [address] [facility] Right        Social History:    Social History     Tobacco Use    Smoking status: Never    Smokeless tobacco: Never   Substance Use Topics    Alcohol use: Yes     Comment: occas                                Counseling given: Not Answered      Vital Signs (Current):   Vitals:    07/19/22 0641   BP: 131/63   Pulse: 77   Resp: 16   Temp: 98 °F (36.7 °C)   TempSrc: Infrared   SpO2: 94%                                              BP Readings from Last 3 Encounters:   07/19/22 131/63   07/07/22 120/70   07/05/22 (!) 124/56       NPO Status: Time of last liquid consumption: 2340>8. H                        Time of last solid consumption: 1900                        Date of last liquid consumption: 07/18/22                        Date of last solid food consumption: 07/18/22    BMI:   Wt Readings from Last 3 Encounters:   07/07/22 225 lb (102.1 kg)   07/05/22 231 lb (104.8 kg)   07/05/22 231 lb (104.8 kg)     There is no height or weight on file to calculate BMI.    CBC:   Lab Results   Component Value Date/Time    WBC 5.5 07/07/2022 08:45 AM    RBC 4.60 07/07/2022 08:45 AM    HGB 13.4 07/07/2022 08:45 AM    HCT 41.0 07/07/2022 08:45 AM    MCV 89.1 07/07/2022 08:45 AM    RDW 12.5 07/07/2022 08:45 AM     07/07/2022 08:45 AM       CMP:   Lab Results   Component Value Date/Time     07/07/2022 08:45 AM    K 4.5 07/07/2022 08:45 AM    K 3.6 12/07/2021 09:41 AM     07/07/2022 08:45 AM    CO2 27 07/07/2022 08:45 AM    BUN 19 07/07/2022 08:45 AM    CREATININE 0.9 07/07/2022 08:45 AM    GFRAA >60 07/07/2022 08:45 AM    LABGLOM >60 07/07/2022 08:45 AM    GLUCOSE 127 07/07/2022 08:45 AM    PROT 7.1 07/07/2022 08:45 AM    CALCIUM 9.2 07/07/2022 08:45 AM    BILITOT 0.2 07/07/2022 08:45 AM    ALKPHOS 90 07/07/2022 08:45 AM    AST 21 07/07/2022 08:45 AM    ALT 28 07/07/2022 08:45 AM       POC Tests: No results for input(s): POCGLU, POCNA, POCK, POCCL, POCBUN, POCHEMO, POCHCT in the last 72 hours. Coags:   Lab Results   Component Value Date/Time    PROTIME 10.7 07/07/2022 08:45 AM    INR 0.9 07/07/2022 08:45 AM    APTT 27.5 07/07/2022 08:45 AM       HCG (If Applicable): No results found for: PREGTESTUR, PREGSERUM, HCG, HCGQUANT     ABGs: No results found for: PHART, PO2ART, LXX1RCC, CHK5TDT, BEART, J3PZVFIU     Type & Screen (If Applicable):  No results found for: LABABO, LABRH    Drug/Infectious Status (If Applicable):  No results found for: HIV, HEPCAB    COVID-19 Screening (If Applicable):   Lab Results   Component Value Date/Time    COVID19 Reactive 03/30/2022 04:15 PM    COVID19 Not Detected 06/04/2020 10:42 AM         Anesthesia Evaluation  Patient summary reviewed history of anesthetic complications (post-puncture after epidural insertion with child birth in 1995):    Airway: Mallampati: I  TM distance: >3 FB   Neck ROM: full  Mouth opening: > = 3 FB   Dental: normal exam         Pulmonary: breath sounds clear to auscultation  (+) sleep apnea: on CPAP,  asthma:     (-) not a current smoker Cardiovascular:  Exercise tolerance: good (>4 METS),   (+) hyperlipidemia      ECG reviewed  Rhythm: regular  Rate: normal                 ROS comment: EKG 2019=Sinus  Rhythm   -RSR(V1) -nondiagnostic. PROBABLY NORMAL     Neuro/Psych:   (+) psychiatric history:            GI/Hepatic/Renal:   (+) GERD:,          ROS comment: Chávez's esophagus. Endo/Other:    (+) hypothyroidism::., .                 Abdominal:         (-) obese       Vascular: negative vascular ROS. Other Findings:             Anesthesia Plan      spinal     ASA 2     (Right adductor canal block)  Induction: intravenous. Anesthetic plan and risks discussed with patient. Plan discussed with CRNA. Post-op pain plan if not by surgeon: single peripheral nerve block      PAT Patient Interview:  Patient seen, chart reviewed per routine on July 7, 2022 at 9:00 AM by Rosina Oden MD.  Above represents information available via shared medical record including previous anesthesia history, drug and allergy history. Confirmation of above and final plan per Day of Surgery (DOS) anesthesiologist.    DOS STAFF ADDENDUM:    Pt seen and examined, chart reviewed (including anesthesia, drug and allergy history). Anesthetic plan, risks, benefits, alternatives, and personnel involved discussed with patient. Patient verbalized an understanding and agrees to proceed. Plan discussed with care team members and agreed upon.     Rosina Oden MD  Staff Anesthesiologist  7:00 AM  Rosina Oden MD   7/19/2022    Above assessment reviewed and in agreement  FITZ Amador - CRNA

## 2022-07-07 NOTE — PROGRESS NOTES
Patient attended preoperative Total Joint Camp on 7/7/2022. Patient is scheduled to have an elective knee replacement. Patient was educated regarding Disease Process, Medications, Smoking Cessation, Oxygenation, Incentive Spirometry and Deep Breath and Cough, signs and symptoms of postoperative joint infection that include: Fever, Chills, Pain Control, Drainage and Redness, post-op follow up with orthopaedic surgeon, dressing removal, staple removal, ambulatory devices which include a wheeled walker and cane, bed mobility, correct anatomical alignment, active range of motion, proper transferring technique, incision care, infection prevention measures, non-pharmacologic comfort measures, notification of inadequate pain control measures, pain scale for assessing level of pain, pharmacologic pain management, relaxation techniques.

## 2022-07-08 LAB — MRSA CULTURE ONLY: NORMAL

## 2022-07-09 LAB — URINE CULTURE, ROUTINE: NORMAL

## 2022-07-19 ENCOUNTER — APPOINTMENT (OUTPATIENT)
Dept: GENERAL RADIOLOGY | Age: 57
End: 2022-07-19
Attending: ORTHOPAEDIC SURGERY
Payer: COMMERCIAL

## 2022-07-19 ENCOUNTER — ANESTHESIA (OUTPATIENT)
Dept: OPERATING ROOM | Age: 57
End: 2022-07-19
Payer: COMMERCIAL

## 2022-07-19 ENCOUNTER — HOSPITAL ENCOUNTER (OUTPATIENT)
Age: 57
Setting detail: OUTPATIENT SURGERY
Discharge: HOME | End: 2022-07-19
Attending: ORTHOPAEDIC SURGERY | Admitting: ORTHOPAEDIC SURGERY
Payer: COMMERCIAL

## 2022-07-19 VITALS
TEMPERATURE: 97.4 F | SYSTOLIC BLOOD PRESSURE: 136 MMHG | DIASTOLIC BLOOD PRESSURE: 68 MMHG | RESPIRATION RATE: 16 BRPM | HEART RATE: 89 BPM | OXYGEN SATURATION: 93 %

## 2022-07-19 DIAGNOSIS — M65.9 SYNOVITIS OF RIGHT KNEE: ICD-10-CM

## 2022-07-19 DIAGNOSIS — M17.11 PRIMARY OSTEOARTHRITIS OF RIGHT KNEE: Primary | ICD-10-CM

## 2022-07-19 DIAGNOSIS — Z96.651 S/P TOTAL KNEE ARTHROPLASTY, RIGHT: ICD-10-CM

## 2022-07-19 PROCEDURE — 97165 OT EVAL LOW COMPLEX 30 MIN: CPT

## 2022-07-19 PROCEDURE — 3600000005 HC SURGERY LEVEL 5 BASE: Performed by: ORTHOPAEDIC SURGERY

## 2022-07-19 PROCEDURE — 97530 THERAPEUTIC ACTIVITIES: CPT | Performed by: PHYSICAL THERAPIST

## 2022-07-19 PROCEDURE — 6360000002 HC RX W HCPCS: Performed by: ANESTHESIOLOGY

## 2022-07-19 PROCEDURE — 2580000003 HC RX 258: Performed by: ORTHOPAEDIC SURGERY

## 2022-07-19 PROCEDURE — 6370000000 HC RX 637 (ALT 250 FOR IP): Performed by: ORTHOPAEDIC SURGERY

## 2022-07-19 PROCEDURE — 2500000003 HC RX 250 WO HCPCS: Performed by: ORTHOPAEDIC SURGERY

## 2022-07-19 PROCEDURE — 97530 THERAPEUTIC ACTIVITIES: CPT

## 2022-07-19 PROCEDURE — 2500000003 HC RX 250 WO HCPCS: Performed by: NURSE ANESTHETIST, CERTIFIED REGISTERED

## 2022-07-19 PROCEDURE — 2580000003 HC RX 258: Performed by: ANESTHESIOLOGY

## 2022-07-19 PROCEDURE — 6360000002 HC RX W HCPCS: Performed by: ORTHOPAEDIC SURGERY

## 2022-07-19 PROCEDURE — 88305 TISSUE EXAM BY PATHOLOGIST: CPT

## 2022-07-19 PROCEDURE — 97110 THERAPEUTIC EXERCISES: CPT | Performed by: PHYSICAL THERAPIST

## 2022-07-19 PROCEDURE — C1713 ANCHOR/SCREW BN/BN,TIS/BN: HCPCS | Performed by: ORTHOPAEDIC SURGERY

## 2022-07-19 PROCEDURE — 6360000002 HC RX W HCPCS

## 2022-07-19 PROCEDURE — 7100000011 HC PHASE II RECOVERY - ADDTL 15 MIN: Performed by: ORTHOPAEDIC SURGERY

## 2022-07-19 PROCEDURE — C1776 JOINT DEVICE (IMPLANTABLE): HCPCS | Performed by: ORTHOPAEDIC SURGERY

## 2022-07-19 PROCEDURE — 3600000015 HC SURGERY LEVEL 5 ADDTL 15MIN: Performed by: ORTHOPAEDIC SURGERY

## 2022-07-19 PROCEDURE — 2580000003 HC RX 258: Performed by: NURSE ANESTHETIST, CERTIFIED REGISTERED

## 2022-07-19 PROCEDURE — 3700000001 HC ADD 15 MINUTES (ANESTHESIA): Performed by: ORTHOPAEDIC SURGERY

## 2022-07-19 PROCEDURE — 6360000002 HC RX W HCPCS: Performed by: NURSE ANESTHETIST, CERTIFIED REGISTERED

## 2022-07-19 PROCEDURE — 27447 TOTAL KNEE ARTHROPLASTY: CPT | Performed by: ORTHOPAEDIC SURGERY

## 2022-07-19 PROCEDURE — 7100000010 HC PHASE II RECOVERY - FIRST 15 MIN: Performed by: ORTHOPAEDIC SURGERY

## 2022-07-19 PROCEDURE — 3700000000 HC ANESTHESIA ATTENDED CARE: Performed by: ORTHOPAEDIC SURGERY

## 2022-07-19 PROCEDURE — 7100000000 HC PACU RECOVERY - FIRST 15 MIN: Performed by: ORTHOPAEDIC SURGERY

## 2022-07-19 PROCEDURE — 64447 NJX AA&/STRD FEMORAL NRV IMG: CPT | Performed by: ANESTHESIOLOGY

## 2022-07-19 PROCEDURE — 2709999900 HC NON-CHARGEABLE SUPPLY: Performed by: ORTHOPAEDIC SURGERY

## 2022-07-19 PROCEDURE — 73560 X-RAY EXAM OF KNEE 1 OR 2: CPT

## 2022-07-19 PROCEDURE — 97161 PT EVAL LOW COMPLEX 20 MIN: CPT | Performed by: PHYSICAL THERAPIST

## 2022-07-19 PROCEDURE — 97535 SELF CARE MNGMENT TRAINING: CPT

## 2022-07-19 PROCEDURE — 97116 GAIT TRAINING THERAPY: CPT | Performed by: PHYSICAL THERAPIST

## 2022-07-19 PROCEDURE — 7100000001 HC PACU RECOVERY - ADDTL 15 MIN: Performed by: ORTHOPAEDIC SURGERY

## 2022-07-19 PROCEDURE — 88311 DECALCIFY TISSUE: CPT

## 2022-07-19 DEVICE — COMPONENT FEM SZ 5 R KNEE POST STBL CEM TRIATHLON: Type: IMPLANTABLE DEVICE | Site: KNEE | Status: FUNCTIONAL

## 2022-07-19 DEVICE — COMPONENT PART KNEE CAPPED K1 STRYKER: Type: IMPLANTABLE DEVICE | Site: KNEE | Status: FUNCTIONAL

## 2022-07-19 DEVICE — INSERT TIB BEAR SZ 4 THK13MM KNEE X3 POST STBL TRIATHLON: Type: IMPLANTABLE DEVICE | Site: KNEE | Status: FUNCTIONAL

## 2022-07-19 DEVICE — BASEPLATE TIB SZ 4 KNEE TRITANIUM CEM PRI LO PROF TRIATHLON: Type: IMPLANTABLE DEVICE | Site: KNEE | Status: FUNCTIONAL

## 2022-07-19 DEVICE — CEMENT BNE 40 GM RADIOPAQUE BA SIMPLEX P: Type: IMPLANTABLE DEVICE | Site: KNEE | Status: FUNCTIONAL

## 2022-07-19 DEVICE — IMPLANT PAT DIA29MM THK8MM X3 SYMMETRIC TRIATHLON: Type: IMPLANTABLE DEVICE | Site: KNEE | Status: FUNCTIONAL

## 2022-07-19 RX ORDER — MIDAZOLAM HYDROCHLORIDE 1 MG/ML
1 INJECTION INTRAMUSCULAR; INTRAVENOUS EVERY 5 MIN PRN
Status: COMPLETED | OUTPATIENT
Start: 2022-07-19 | End: 2022-07-19

## 2022-07-19 RX ORDER — DIPHENHYDRAMINE HYDROCHLORIDE 50 MG/ML
12.5 INJECTION INTRAMUSCULAR; INTRAVENOUS
Status: DISCONTINUED | OUTPATIENT
Start: 2022-07-19 | End: 2022-07-19 | Stop reason: HOSPADM

## 2022-07-19 RX ORDER — ROPIVACAINE HYDROCHLORIDE 5 MG/ML
INJECTION, SOLUTION EPIDURAL; INFILTRATION; PERINEURAL
Status: COMPLETED | OUTPATIENT
Start: 2022-07-19 | End: 2022-07-19

## 2022-07-19 RX ORDER — LEVOTHYROXINE SODIUM 0.05 MG/1
1 TABLET ORAL DAILY
Status: CANCELLED | OUTPATIENT
Start: 2022-07-19

## 2022-07-19 RX ORDER — ASPIRIN 325 MG
325 TABLET, DELAYED RELEASE (ENTERIC COATED) ORAL 2 TIMES DAILY WITH MEALS
Qty: 60 TABLET | Refills: 0 | Status: SHIPPED | OUTPATIENT
Start: 2022-07-19 | End: 2022-10-20 | Stop reason: CLARIF

## 2022-07-19 RX ORDER — FENTANYL CITRATE 50 UG/ML
50 INJECTION, SOLUTION INTRAMUSCULAR; INTRAVENOUS EVERY 5 MIN PRN
Status: COMPLETED | OUTPATIENT
Start: 2022-07-19 | End: 2022-07-19

## 2022-07-19 RX ORDER — HYDRALAZINE HYDROCHLORIDE 20 MG/ML
10 INJECTION INTRAMUSCULAR; INTRAVENOUS
Status: DISCONTINUED | OUTPATIENT
Start: 2022-07-19 | End: 2022-07-19 | Stop reason: HOSPADM

## 2022-07-19 RX ORDER — ROPIVACAINE HYDROCHLORIDE 5 MG/ML
30 INJECTION, SOLUTION EPIDURAL; INFILTRATION; PERINEURAL ONCE
Status: COMPLETED | OUTPATIENT
Start: 2022-07-19 | End: 2022-07-19

## 2022-07-19 RX ORDER — MIDAZOLAM HYDROCHLORIDE 1 MG/ML
INJECTION INTRAMUSCULAR; INTRAVENOUS PRN
Status: DISCONTINUED | OUTPATIENT
Start: 2022-07-19 | End: 2022-07-19 | Stop reason: SDUPTHER

## 2022-07-19 RX ORDER — SODIUM CHLORIDE 0.9 % (FLUSH) 0.9 %
5-40 SYRINGE (ML) INJECTION PRN
Status: CANCELLED | OUTPATIENT
Start: 2022-07-19

## 2022-07-19 RX ORDER — ALBUTEROL SULFATE 90 UG/1
2 AEROSOL, METERED RESPIRATORY (INHALATION) 4 TIMES DAILY PRN
Status: CANCELLED | OUTPATIENT
Start: 2022-07-19

## 2022-07-19 RX ORDER — FENTANYL CITRATE 50 UG/ML
INJECTION, SOLUTION INTRAMUSCULAR; INTRAVENOUS PRN
Status: DISCONTINUED | OUTPATIENT
Start: 2022-07-19 | End: 2022-07-19 | Stop reason: SDUPTHER

## 2022-07-19 RX ORDER — ONDANSETRON 2 MG/ML
4 INJECTION INTRAMUSCULAR; INTRAVENOUS
Status: DISCONTINUED | OUTPATIENT
Start: 2022-07-19 | End: 2022-07-19 | Stop reason: HOSPADM

## 2022-07-19 RX ORDER — PROPOFOL 10 MG/ML
INJECTION, EMULSION INTRAVENOUS CONTINUOUS PRN
Status: DISCONTINUED | OUTPATIENT
Start: 2022-07-19 | End: 2022-07-19 | Stop reason: SDUPTHER

## 2022-07-19 RX ORDER — TRAZODONE HYDROCHLORIDE 50 MG/1
100 TABLET ORAL NIGHTLY
Status: CANCELLED | OUTPATIENT
Start: 2022-07-19

## 2022-07-19 RX ORDER — ONDANSETRON 2 MG/ML
4 INJECTION INTRAMUSCULAR; INTRAVENOUS EVERY 6 HOURS PRN
Status: CANCELLED | OUTPATIENT
Start: 2022-07-19

## 2022-07-19 RX ORDER — ACETAMINOPHEN 500 MG
1000 TABLET ORAL ONCE
Status: COMPLETED | OUTPATIENT
Start: 2022-07-19 | End: 2022-07-19

## 2022-07-19 RX ORDER — CELECOXIB 100 MG/1
200 CAPSULE ORAL ONCE
Status: COMPLETED | OUTPATIENT
Start: 2022-07-19 | End: 2022-07-19

## 2022-07-19 RX ORDER — ROPIVACAINE HYDROCHLORIDE 5 MG/ML
INJECTION, SOLUTION EPIDURAL; INFILTRATION; PERINEURAL
Status: COMPLETED
Start: 2022-07-19 | End: 2022-07-19

## 2022-07-19 RX ORDER — SODIUM CHLORIDE 0.9 % (FLUSH) 0.9 %
5-40 SYRINGE (ML) INJECTION PRN
Status: DISCONTINUED | OUTPATIENT
Start: 2022-07-19 | End: 2022-07-19 | Stop reason: HOSPADM

## 2022-07-19 RX ORDER — MORPHINE SULFATE 2 MG/ML
2 INJECTION, SOLUTION INTRAMUSCULAR; INTRAVENOUS
Status: CANCELLED | OUTPATIENT
Start: 2022-07-19

## 2022-07-19 RX ORDER — DEXAMETHASONE SODIUM PHOSPHATE 10 MG/ML
8 INJECTION INTRAMUSCULAR; INTRAVENOUS ONCE
Status: COMPLETED | OUTPATIENT
Start: 2022-07-19 | End: 2022-07-19

## 2022-07-19 RX ORDER — DEXTROSE AND SODIUM CHLORIDE 5; .45 G/100ML; G/100ML
INJECTION, SOLUTION INTRAVENOUS CONTINUOUS
Status: CANCELLED | OUTPATIENT
Start: 2022-07-19

## 2022-07-19 RX ORDER — PROPOFOL 10 MG/ML
INJECTION, EMULSION INTRAVENOUS PRN
Status: DISCONTINUED | OUTPATIENT
Start: 2022-07-19 | End: 2022-07-19 | Stop reason: SDUPTHER

## 2022-07-19 RX ORDER — IPRATROPIUM BROMIDE AND ALBUTEROL SULFATE 2.5; .5 MG/3ML; MG/3ML
1 SOLUTION RESPIRATORY (INHALATION)
Status: DISCONTINUED | OUTPATIENT
Start: 2022-07-19 | End: 2022-07-19 | Stop reason: HOSPADM

## 2022-07-19 RX ORDER — PANTOPRAZOLE SODIUM 40 MG/1
40 TABLET, DELAYED RELEASE ORAL DAILY
Status: CANCELLED | OUTPATIENT
Start: 2022-07-19

## 2022-07-19 RX ORDER — ONDANSETRON 4 MG/1
4 TABLET, ORALLY DISINTEGRATING ORAL EVERY 8 HOURS PRN
Status: CANCELLED | OUTPATIENT
Start: 2022-07-19

## 2022-07-19 RX ORDER — PROCHLORPERAZINE EDISYLATE 5 MG/ML
5 INJECTION INTRAMUSCULAR; INTRAVENOUS
Status: DISCONTINUED | OUTPATIENT
Start: 2022-07-19 | End: 2022-07-19 | Stop reason: HOSPADM

## 2022-07-19 RX ORDER — MELOXICAM 7.5 MG/1
7.5 TABLET ORAL DAILY
Status: DISCONTINUED | OUTPATIENT
Start: 2022-07-19 | End: 2022-07-19 | Stop reason: HOSPADM

## 2022-07-19 RX ORDER — SODIUM CHLORIDE 9 MG/ML
INJECTION, SOLUTION INTRAVENOUS PRN
Status: DISCONTINUED | OUTPATIENT
Start: 2022-07-19 | End: 2022-07-19 | Stop reason: HOSPADM

## 2022-07-19 RX ORDER — SODIUM CHLORIDE, SODIUM LACTATE, POTASSIUM CHLORIDE, CALCIUM CHLORIDE 600; 310; 30; 20 MG/100ML; MG/100ML; MG/100ML; MG/100ML
INJECTION, SOLUTION INTRAVENOUS CONTINUOUS
Status: DISCONTINUED | OUTPATIENT
Start: 2022-07-19 | End: 2022-07-19 | Stop reason: HOSPADM

## 2022-07-19 RX ORDER — LABETALOL HYDROCHLORIDE 5 MG/ML
10 INJECTION, SOLUTION INTRAVENOUS
Status: DISCONTINUED | OUTPATIENT
Start: 2022-07-19 | End: 2022-07-19 | Stop reason: HOSPADM

## 2022-07-19 RX ORDER — SODIUM CHLORIDE, SODIUM LACTATE, POTASSIUM CHLORIDE, CALCIUM CHLORIDE 600; 310; 30; 20 MG/100ML; MG/100ML; MG/100ML; MG/100ML
INJECTION, SOLUTION INTRAVENOUS CONTINUOUS PRN
Status: DISCONTINUED | OUTPATIENT
Start: 2022-07-19 | End: 2022-07-19 | Stop reason: SDUPTHER

## 2022-07-19 RX ORDER — ACETAMINOPHEN 325 MG/1
650 TABLET ORAL EVERY 6 HOURS
Status: CANCELLED | OUTPATIENT
Start: 2022-07-19

## 2022-07-19 RX ORDER — MORPHINE SULFATE 4 MG/ML
4 INJECTION, SOLUTION INTRAMUSCULAR; INTRAVENOUS
Status: CANCELLED | OUTPATIENT
Start: 2022-07-19

## 2022-07-19 RX ORDER — CEFAZOLIN 2 G/1
INJECTION, POWDER, FOR SOLUTION INTRAMUSCULAR; INTRAVENOUS
Status: DISCONTINUED
Start: 2022-07-19 | End: 2022-07-19 | Stop reason: HOSPADM

## 2022-07-19 RX ORDER — VANCOMYCIN HYDROCHLORIDE 1 G/20ML
INJECTION, POWDER, LYOPHILIZED, FOR SOLUTION INTRAVENOUS PRN
Status: DISCONTINUED | OUTPATIENT
Start: 2022-07-19 | End: 2022-07-19 | Stop reason: HOSPADM

## 2022-07-19 RX ORDER — OXYCODONE HYDROCHLORIDE 5 MG/1
5 TABLET ORAL EVERY 4 HOURS PRN
Status: DISCONTINUED | OUTPATIENT
Start: 2022-07-19 | End: 2022-07-19 | Stop reason: HOSPADM

## 2022-07-19 RX ORDER — KETOROLAC TROMETHAMINE 30 MG/ML
30 INJECTION, SOLUTION INTRAMUSCULAR; INTRAVENOUS
Status: COMPLETED | OUTPATIENT
Start: 2022-07-19 | End: 2022-07-19

## 2022-07-19 RX ORDER — SODIUM CHLORIDE 0.9 % (FLUSH) 0.9 %
5-40 SYRINGE (ML) INJECTION EVERY 12 HOURS SCHEDULED
Status: DISCONTINUED | OUTPATIENT
Start: 2022-07-19 | End: 2022-07-19 | Stop reason: HOSPADM

## 2022-07-19 RX ORDER — OXYCODONE HYDROCHLORIDE AND ACETAMINOPHEN 5; 325 MG/1; MG/1
1 TABLET ORAL EVERY 6 HOURS PRN
Qty: 28 TABLET | Refills: 0 | Status: SHIPPED | OUTPATIENT
Start: 2022-07-19 | End: 2022-07-25 | Stop reason: SDUPTHER

## 2022-07-19 RX ORDER — MORPHINE SULFATE 2 MG/ML
2 INJECTION, SOLUTION INTRAMUSCULAR; INTRAVENOUS EVERY 5 MIN PRN
Status: DISCONTINUED | OUTPATIENT
Start: 2022-07-19 | End: 2022-07-19 | Stop reason: HOSPADM

## 2022-07-19 RX ORDER — FENTANYL CITRATE 50 UG/ML
INJECTION, SOLUTION INTRAMUSCULAR; INTRAVENOUS
Status: COMPLETED
Start: 2022-07-19 | End: 2022-07-19

## 2022-07-19 RX ORDER — SODIUM CHLORIDE 9 MG/ML
INJECTION, SOLUTION INTRAVENOUS PRN
Status: CANCELLED | OUTPATIENT
Start: 2022-07-19

## 2022-07-19 RX ORDER — MIDAZOLAM HYDROCHLORIDE 1 MG/ML
INJECTION INTRAMUSCULAR; INTRAVENOUS
Status: COMPLETED
Start: 2022-07-19 | End: 2022-07-19

## 2022-07-19 RX ORDER — MEPERIDINE HYDROCHLORIDE 25 MG/ML
12.5 INJECTION INTRAMUSCULAR; INTRAVENOUS; SUBCUTANEOUS EVERY 5 MIN PRN
Status: DISCONTINUED | OUTPATIENT
Start: 2022-07-19 | End: 2022-07-19 | Stop reason: HOSPADM

## 2022-07-19 RX ORDER — LORAZEPAM 2 MG/ML
0.5 INJECTION INTRAMUSCULAR
Status: DISCONTINUED | OUTPATIENT
Start: 2022-07-19 | End: 2022-07-19 | Stop reason: HOSPADM

## 2022-07-19 RX ORDER — LIDOCAINE HYDROCHLORIDE 10 MG/ML
INJECTION, SOLUTION EPIDURAL; INFILTRATION; INTRACAUDAL; PERINEURAL PRN
Status: DISCONTINUED | OUTPATIENT
Start: 2022-07-19 | End: 2022-07-19 | Stop reason: SDUPTHER

## 2022-07-19 RX ORDER — OXYCODONE HYDROCHLORIDE 5 MG/1
10 TABLET ORAL EVERY 4 HOURS PRN
Status: DISCONTINUED | OUTPATIENT
Start: 2022-07-19 | End: 2022-07-19 | Stop reason: HOSPADM

## 2022-07-19 RX ORDER — SODIUM CHLORIDE 0.9 % (FLUSH) 0.9 %
5-40 SYRINGE (ML) INJECTION EVERY 12 HOURS SCHEDULED
Status: CANCELLED | OUTPATIENT
Start: 2022-07-19

## 2022-07-19 RX ORDER — BUDESONIDE AND FORMOTEROL FUMARATE DIHYDRATE 160; 4.5 UG/1; UG/1
2 AEROSOL RESPIRATORY (INHALATION) 2 TIMES DAILY PRN
Status: CANCELLED | OUTPATIENT
Start: 2022-07-19

## 2022-07-19 RX ORDER — BUPIVACAINE HYDROCHLORIDE 7.5 MG/ML
INJECTION, SOLUTION INTRASPINAL PRN
Status: DISCONTINUED | OUTPATIENT
Start: 2022-07-19 | End: 2022-07-19 | Stop reason: SDUPTHER

## 2022-07-19 RX ADMIN — ROPIVACAINE HYDROCHLORIDE 30 ML: 5 INJECTION, SOLUTION EPIDURAL; INFILTRATION; PERINEURAL at 08:16

## 2022-07-19 RX ADMIN — SODIUM CHLORIDE, POTASSIUM CHLORIDE, SODIUM LACTATE AND CALCIUM CHLORIDE: 600; 310; 30; 20 INJECTION, SOLUTION INTRAVENOUS at 08:25

## 2022-07-19 RX ADMIN — MIDAZOLAM 1 MG: 1 INJECTION INTRAMUSCULAR; INTRAVENOUS at 07:56

## 2022-07-19 RX ADMIN — FENTANYL CITRATE 50 MCG: 50 INJECTION, SOLUTION INTRAMUSCULAR; INTRAVENOUS at 07:55

## 2022-07-19 RX ADMIN — PROPOFOL INJECTABLE EMULSION 60 MG: 10 INJECTION, EMULSION INTRAVENOUS at 08:45

## 2022-07-19 RX ADMIN — SODIUM CHLORIDE, POTASSIUM CHLORIDE, SODIUM LACTATE AND CALCIUM CHLORIDE: 600; 310; 30; 20 INJECTION, SOLUTION INTRAVENOUS at 08:50

## 2022-07-19 RX ADMIN — PROPOFOL INJECTABLE EMULSION 30 MG: 10 INJECTION, EMULSION INTRAVENOUS at 08:53

## 2022-07-19 RX ADMIN — PROPOFOL 50 MCG/KG/MIN: 10 INJECTION, EMULSION INTRAVENOUS at 08:45

## 2022-07-19 RX ADMIN — ACETAMINOPHEN 1000 MG: 500 TABLET ORAL at 07:03

## 2022-07-19 RX ADMIN — CEFAZOLIN 2000 MG: 2 INJECTION, POWDER, FOR SOLUTION INTRAMUSCULAR; INTRAVENOUS at 08:40

## 2022-07-19 RX ADMIN — MIDAZOLAM 2 MG: 1 INJECTION INTRAMUSCULAR; INTRAVENOUS at 08:47

## 2022-07-19 RX ADMIN — PROPOFOL 50 MCG/KG/MIN: 10 INJECTION, EMULSION INTRAVENOUS at 09:52

## 2022-07-19 RX ADMIN — CELECOXIB 200 MG: 100 CAPSULE ORAL at 07:03

## 2022-07-19 RX ADMIN — SODIUM CHLORIDE, POTASSIUM CHLORIDE, SODIUM LACTATE AND CALCIUM CHLORIDE: 600; 310; 30; 20 INJECTION, SOLUTION INTRAVENOUS at 06:57

## 2022-07-19 RX ADMIN — FENTANYL CITRATE 50 MCG: 50 INJECTION, SOLUTION INTRAMUSCULAR; INTRAVENOUS at 08:34

## 2022-07-19 RX ADMIN — KETOROLAC TROMETHAMINE 30 MG: 30 INJECTION, SOLUTION INTRAMUSCULAR at 13:33

## 2022-07-19 RX ADMIN — BUPIVACAINE HYDROCHLORIDE IN DEXTROSE 2 ML: 7.5 INJECTION, SOLUTION SUBARACHNOID at 08:39

## 2022-07-19 RX ADMIN — ROPIVACAINE HYDROCHLORIDE 30 ML: 5 INJECTION EPIDURAL; INFILTRATION; PERINEURAL at 08:11

## 2022-07-19 RX ADMIN — MELOXICAM 7.5 MG: 7.5 TABLET ORAL at 13:26

## 2022-07-19 RX ADMIN — CEFAZOLIN 2000 MG: 2 INJECTION, POWDER, FOR SOLUTION INTRAMUSCULAR; INTRAVENOUS at 11:53

## 2022-07-19 RX ADMIN — FENTANYL CITRATE 50 MCG: 50 INJECTION, SOLUTION INTRAMUSCULAR; INTRAVENOUS at 08:00

## 2022-07-19 RX ADMIN — PROPOFOL INJECTABLE EMULSION 30 MG: 10 INJECTION, EMULSION INTRAVENOUS at 08:49

## 2022-07-19 RX ADMIN — PROPOFOL 50 MCG/KG/MIN: 10 INJECTION, EMULSION INTRAVENOUS at 10:33

## 2022-07-19 RX ADMIN — PROPOFOL INJECTABLE EMULSION 30 MG: 10 INJECTION, EMULSION INTRAVENOUS at 09:45

## 2022-07-19 RX ADMIN — FENTANYL CITRATE 25 MCG: 50 INJECTION, SOLUTION INTRAMUSCULAR; INTRAVENOUS at 09:45

## 2022-07-19 RX ADMIN — MIDAZOLAM 1 MG: 1 INJECTION INTRAMUSCULAR; INTRAVENOUS at 08:01

## 2022-07-19 RX ADMIN — OXYCODONE 10 MG: 5 TABLET ORAL at 16:08

## 2022-07-19 RX ADMIN — FENTANYL CITRATE 25 MCG: 50 INJECTION, SOLUTION INTRAMUSCULAR; INTRAVENOUS at 08:39

## 2022-07-19 RX ADMIN — ROPIVACAINE HYDROCHLORIDE 30 ML: 5 INJECTION, SOLUTION EPIDURAL; INFILTRATION; PERINEURAL at 08:11

## 2022-07-19 RX ADMIN — LIDOCAINE HYDROCHLORIDE 2 MG: 10 INJECTION, SOLUTION EPIDURAL; INFILTRATION; INTRACAUDAL; PERINEURAL at 08:45

## 2022-07-19 RX ADMIN — DEXAMETHASONE SODIUM PHOSPHATE 8 MG: 10 INJECTION INTRAMUSCULAR; INTRAVENOUS at 07:03

## 2022-07-19 RX ADMIN — PROPOFOL INJECTABLE EMULSION 30 MG: 10 INJECTION, EMULSION INTRAVENOUS at 10:37

## 2022-07-19 ASSESSMENT — PAIN SCALES - GENERAL
PAINLEVEL_OUTOF10: 4
PAINLEVEL_OUTOF10: 4
PAINLEVEL_OUTOF10: 0
PAINLEVEL_OUTOF10: 2
PAINLEVEL_OUTOF10: 6
PAINLEVEL_OUTOF10: 8
PAINLEVEL_OUTOF10: 0
PAINLEVEL_OUTOF10: 2
PAINLEVEL_OUTOF10: 0

## 2022-07-19 ASSESSMENT — PAIN DESCRIPTION - DESCRIPTORS
DESCRIPTORS: DISCOMFORT;PINS AND NEEDLES
DESCRIPTORS: ACHING
DESCRIPTORS: ACHING;DULL
DESCRIPTORS: DISCOMFORT;PINS AND NEEDLES
DESCRIPTORS: THROBBING

## 2022-07-19 ASSESSMENT — PAIN - FUNCTIONAL ASSESSMENT
PAIN_FUNCTIONAL_ASSESSMENT: ACTIVITIES ARE NOT PREVENTED
PAIN_FUNCTIONAL_ASSESSMENT: ACTIVITIES ARE NOT PREVENTED
PAIN_FUNCTIONAL_ASSESSMENT: PREVENTS OR INTERFERES SOME ACTIVE ACTIVITIES AND ADLS
PAIN_FUNCTIONAL_ASSESSMENT: ACTIVITIES ARE NOT PREVENTED
PAIN_FUNCTIONAL_ASSESSMENT: 0-10

## 2022-07-19 ASSESSMENT — PAIN DESCRIPTION - LOCATION
LOCATION: KNEE
LOCATION: KNEE
LOCATION: LEG
LOCATION: KNEE

## 2022-07-19 ASSESSMENT — PAIN DESCRIPTION - ORIENTATION
ORIENTATION: RIGHT;LOWER

## 2022-07-19 ASSESSMENT — PAIN DESCRIPTION - PAIN TYPE
TYPE: SURGICAL PAIN

## 2022-07-19 ASSESSMENT — PAIN DESCRIPTION - FREQUENCY
FREQUENCY: CONTINUOUS

## 2022-07-19 ASSESSMENT — PAIN DESCRIPTION - ONSET: ONSET: ON-GOING

## 2022-07-19 NOTE — ANESTHESIA POSTPROCEDURE EVALUATION
Department of Anesthesiology  Postprocedure Note    Patient: Dasha Tong  MRN: [id number]  YOB: 1965  Date of evaluation: 7/19/2022      Procedure Summary     Date: 07/19/22 Room / Location: [address] [place] [address]    Anesthesia Start: Juany Lyons Anesthesia Stop: 0617    Procedure: TOTAL RIGHT KNEE REPLACEMENT  ARTHROPLASTY (MARJORIE) (Right: Knee) Diagnosis:       Synovitis of right knee      (Synovitis of right knee [M65.9])    Surgeons: Jj Gonzalez DO Responsible Provider: Sena Weir MD    Anesthesia Type: spinal ASA Status: 2          Anesthesia Type: No value filed.     Pedro Luis Phase I: Pedro Luis Score: 10    Pedro Luis Phase II: Pedro Luis Score: 10      Anesthesia Post Evaluation    Patient location during evaluation: PACU  Patient participation: complete - patient participated  Level of consciousness: awake  Airway patency: patent  Nausea & Vomiting: no nausea and no vomiting  Complications: no  Cardiovascular status: hemodynamically stable  Respiratory status: acceptable  Hydration status: euvolemic

## 2022-07-19 NOTE — OP NOTE
Operative Note      Patient: Jo Ann Evangelista  YOB: 1965  MRN: [id number]    Date of Procedure: 7/19/2022    Pre-Op Diagnosis: Generative arthritis right knee    Post-Op Diagnosis: Same       Procedure(s):  TOTAL RIGHT KNEE REPLACEMENT  ARTHROPLASTY (MARJORIE)    Surgeon(s):  EMANUEL Benz DO    Assistant:   Resident: Todd Connor DO; Sherry Jean DO    Anesthesia: General    Estimated Blood Loss (mL): Minimal    Complications: None    Specimens:   ID Type Source Tests Collected by Time Destination   A : BONE RIGHT KNEE Bone Bone SURGICAL PATHOLOGY EMANUEL Benz DO 7/19/2022 1055        Implants:  Implant Name Type Inv. Item Serial No.  Lot No. LRB No. Used Action   CEMENT BNE 40 GM RADIOPAQUE BA SIMPLEX P - [id number]  CEMENT BNE 40 GM RADIOPAQUE BA SIMPLEX P  [employer] [facility] [id number] Right 1 Implanted   BASEPLATE TIB SZ 4 KNEE TRITANIUM KIRA IVY LO PROF TRIATHLON - [id number]  BASEPLATE TIB SZ 4 KNEE TRITANIUM KIRA IVY LO PROF TRIATHLON  [employer] [facility] [id number] Right 1 Implanted   COMPONENT FEM SZ 5 R KNEE POST STBL KIRA TRIATHLON - [id number]  COMPONENT FEM SZ 5 R KNEE POST STBL KIRA TRIATHLON  [employer] [facility]- [id number] Right 1 Implanted   IMPLANT PAT [id number] [id number] X3 SYMMETRIC TRIATHLON - [id number]  IMPLANT PAT [id number] [id number] X3 SYMMETRIC TRIATHLON  [employer] [facility] [id number] Right 1 Implanted   INSERT TIB BEAR SZ 4 [id number] KNEE X3 POST STBL TRIATHLON - [id number]  INSERT TIB BEAR SZ 4 [id number] KNEE X3 POST STBL TRIATHLON  [employer] [facility] [id number] Right 1 Implanted         Drains: * No LDAs found *    Findings: Degeneration right knee with articular cartilage thinning and subchondral sclerosis capsular contracture and subchondral cysts    Detailed Description of Procedure: The patient is brought the operating room after signs out were notified. Adductor canal block of been done by anesthesia in the holding area.   Patient placed supine after spinal anesthetic was administered by anesthesia. A tourniquet was placed on right upper thigh and leg was then prepped and draped sterile fashion. Straight anterior midline incision was marked and the leg was exsanguinated with elastic wrap tourniquet pressure set at 3 mmHg pressure. Incision was made and full-thickness medial lateral flaps were elevated. A median parapatellar capsulotomy was performed. Portion of the fat pad was removed. Some of the fat and synovium above the trochlea was removed as well. The patella was then measured for thickness and cut for the appropriate thickness replacement and protective metal disc was applied. This was retracted out of the way. The femur was then prepared and an IM drill hole was made for alignment of the distal cutting block set up at 4 degrees valgus and 8 mm resection. The bone cut was made. The femur was then sized at 5 for best coverage without overhang or notching. The 4-in-1 cutting block was applied and the anterior and posterior chamfers as well as the anterior posterior cuts were made and cleaned up as necessary to make these accurate. Femoral trial fit well. The tibia was then retracted forward and the meniscus remnants were removed. The cruciate ligaments were removed as well. The tibia was set up for IM guidance for the tibial cutting block referencing the tibial crest.  The spacer block was used prior to cutting bone to assess the balance and alignment. The bone cuts was then made to bone wafer was removed and the cut was cleaned up as necessary to get a nice flat surface. The tensiometer was then placed and initially there was extreme tightness on the medial side however slight soft tissue release and adjustment of the tool resulted in good balance medial lateral and also good flexion extension gaps. The tibia was set up with a size 4 baseplate which gave us best coverage without overhang.   The 9 mm bearing was initially placed in the femoral trial component. Ultimately the best tension and balance were noted with the 13 mm tibial bearing which took the slack out of the knee. The knee would reach full extension and flex to over 110 degrees limited only by body habitus. The sizes were chosen as a final construct and selected accordingly. The tibia was then prepared for the fin baseplate. The femur was then cut for the box for the posterior stabilized femoral component and the patella was set up for the 29 mm symmetric patellar bearing. The trials were then removed. A bone plug was placed in the distal femoral canal.  The knee was irrigated with pulse lavage to get good clean surfaces and also ensure cement interdigitation. Small cyst was evacuated and the tibia and the cement was mixed on my viscus and all components were cemented with excess removed during initial impaction and after partial set up. Tibial bearing snapped securely in place. Patellar bearing was placed and held with a self-retaining clamp as the knee was irrigated and the cement set up. Shocker solution was used for the appropriate time then irrigated clear with saline and the capsule was closed over vancomycin powder with 0 Vicryl figure-of-eight sutures with good secure capsular closure and good extensor agonism tension. The subcu was then irrigated and closed over the remaining vancomycin powder with 2-0 Vicryl and stainless steel staples were used in skin. Aquacel Ag was placed over the wound and a bulky Mcdonough type dressing was applied. The tourniquet was deflated and good circulation turned to the lower extremity. The patient was then taken recovery in stable condition. All reasonable efforts have been made to minimize the risk of errors that may occur in the use of voice recognition and other electronic means of charting.         Electronically signed by Chandler Ramirez DO on 7/19/2022 at 11:04 AM

## 2022-07-19 NOTE — PROGRESS NOTES
[address] [facility]  [facility] [place]. [place]        Date:2022                                                  Patient Name: Macario Jennings    MRN: [id number]    : 1965    Room: OR POOL/NONE      Evaluating OT: Marcus Gregory OTR/L; [id number]     Referring Provider and Specific Provider Orders/Date:      22  OT eval and treat  Start:  22,   End:  22,   ONE TIME,   Standing Count:  1 Occurrences,   R         EMANUEL Aaron DO      Placement Recommendation: Home with no skilled occupational therapy needed after discharge from inpatient. Diagnosis:   1. Primary osteoarthritis of right knee    2. Synovitis of right knee    3.  S/P total knee arthroplasty, right         Surgery: R TKA       Pertinent Medical History:       Past Medical History:   Diagnosis Date    Anxiety     Asthma     controlled     Chávez's esophagus     Depression     GERD (gastroesophageal reflux disease)     H/O cardiovascular stress test 2022    Exercise Treadmill Stress    Sleep apnea     uses cpap    Thyroid disease     newly dx hypothyroid  (no meds ordered yet)         Past Surgical History:   Procedure Laterality Date    ANKLE SURGERY Left     APPENDECTOMY      BREAST SURGERY      bx x2 left neg    CHOLECYSTECTOMY      COLONOSCOPY      ENDOSCOPY, COLON, DIAGNOSTIC      HAND SURGERY Left     HYSTERECTOMY (CERVIX STATUS UNKNOWN)      KNEE ARTHROSCOPY Left 2020    Arthroscopic Exam and Treatment of left knee    KNEE ARTHROSCOPY Left 2020    ARTHROSCOPIC EXAM AND TREATMENT LEFT KNEE, MEDIAL MENISECTOMY, CHONDROPASTY AND SYNOVECTOMY performed by Sinan Avery DO at [facility] [address]       Precautions:  Fall Risk, full weight bearing: R LE d/t TKA, incontinent of urine upon standing       Assessment of current deficits:     [x] Functional mobility pain in R knee; Nursing notified. Cognition: A&O: 4/4; Follows 3 step directions   Memory: good    Sequencing: good    Problem solving: good    Judgement/safety: good     [facility]   AM-PAC Daily Activity Inpatient   How much help for putting on and taking off regular lower body clothing?: A Little  How much help for Bathing?: A Little  How much help for Toileting?: A Little  How much help for putting on and taking off regular upper body clothing?: A Little  How much help for taking care of personal grooming?: A Little  How much help for eating meals?: None  AM-PAC Inpatient Daily Activity Raw Score: 19  AM-PAC Inpatient ADL T-Scale Score : 40.22  ADL Inpatient CMS 0-100% Score: 42.8  ADL Inpatient CMS G-Code Modifier : CK     Functional Assessment:    Initial Eval Status  Date: 7/19/22 Treatment Status  Date: STGs = LTGs  Time frame: 10-14 days   Feeding Independent   Independent    Grooming Supervision   Independent    UB Dressing Supervision   Independent    LB Dressing Minimal Assist to doff incontinent garment. Minimal Assist to thread feet through incontinent garment and hospital pants while seated on bedside commode then stood with assistance to bring up around hips and waist   Independent    Bathing Minimal Assist  Independent    Toileting Supervision with hygiene after using bedside commode    Independent    Bed Mobility  Supine to sit: Supervision   Sit to supine: Supervision   Supine to sit: Independent   Sit to supine: Independent    Functional Transfers Minimal Assist from EOB to wheeled walker. Minimal Assist for transfer to and from bedside commode. Supervision for transfer from standing to edge of bed. Transfer training with verbal cues for hand placement throughout session to improve safety. Independent    Functional Mobility Minimal Assist with wheeled walker to improve balance to and from bedside commode and within the room, verbal cues for walker sequence and safety.  B LE numbness from Re-Eval [id number]            ADL/Self Care 73401  20  1    Therapeutic Activities 18619  10  1    Therapeutic Ex 82717       Orthotic Management 73178       Manual [id number]     Neuro Re-Ed 81128       Non-Billable Time        Evaluation Time additionally includes thorough review of current medical information, gathering information on past medical history/social history and prior level of function, interpretation of standardized testing/informal observation of tasks, assessment of data and development of plan of care and goals.         Evaluating OT: Maria Fernanda Romero OTR/L; [id number]

## 2022-07-19 NOTE — PROGRESS NOTES
8370 Time out done. [id number] Right adductor nerve block done per Dr Regina Gonzalez.  Tolerated well

## 2022-07-19 NOTE — DISCHARGE INSTRUCTIONS
Your information:  Name: Jose Armando Sorto  : 1965    Your instructions:  Discharge home with plans for outpatient physical therapy    Orthopedic Discharge Instructions:  Continue physical therapy. Weight bear as tolerated to operative leg. Take Asprin as prescribed for prevention of blood clots. Take pain medication as prescribed. Maintain Aquacel dressing for 7 days. May remove and shower thereafter. Keep covered with dry dressing until drainage ceases. Follow up with Dr. Gurdeep Cervantes 3 weeks post op    What to do after you leave the hospital:    Recommended diet: regular diet    Recommended activity: activity as tolerated    Signs and symptoms to watch out for:  A red, swollen, painful leg, especially in the calf area  Shortness of breath  Redness to incision  Incision hot to touch  Increased pain and feeling of tightness in upper thigh  Increase in drainage or pus from incision  Swelling that does not respond to ice and elevation  Fever above 101*    Information obtained by:  By signing below, I understand that if any problems occur once I leave the hospital I am to contact Dr. Gurdeep Cervantes. I understand and acknowledge receipt of the instructions indicated above. Total Knee Replacement: What to Expect at [address] [place] [address] Drive had a total knee replacement. The doctor replaced the worn ends of the bones that connect to your knee (thighbone and lower leg bone) with plastic and metal parts. When you leave the hospital, you should be able to move around with a walker or crutches. But you will need someone to help you at home until you have more energy and can move around better. You will go home with a bandage and stitches, staples, skin glue, or tape strips. Change the bandage as your doctor tells you to. If you have stitches or staples, your doctor will remove them about 2 weeks after your surgery. Glue or tape strips will fall off on their own over time.  You may still have some mild pain, and the area may be swollen for a few months after surgery. Your knee will continue to improve for up to a year. You will probably use a walker for some time after surgery. When you are ready, you can use a cane. You may be able to walk without support after a couple weeks, or when you are comfortable. You will need to do months of physical rehabilitation (rehab) after a knee replacement. Rehab will help you strengthen the muscles of the knee and help you regain movement. After you recover, your artificial knee will allow you to do normal daily activities with less pain or no pain at all. You may be able to hike, dance, or ride a bike. Talk to your doctor about whether you can do more strenuous activities. Always tell your caregivers that you have an artificial knee. How long it will take to walk on your own, return to normal activities, and go back to work depends on your health and how well your rehabilitation (rehab) program goes. The better you do with your rehab exercises, the quicker you will get your strength and movement back. This care sheet gives you a general idea about how long it will take for you to recover. But each person recovers at a different pace. Follow the steps below to get better as quickly as possible. How can you care for yourself at home? Activity    Rest when you feel tired. You may take a nap, but don't stay in bed all day. When you sit, use a chair with arms. You can use the arms to help you stand up. Work with your physical therapist to find the best way to exercise. What you can do as your knee heals will depend on whether your new knee is cemented or uncemented. You may not be able to do certain things for a while if your new knee is uncemented. After your knee has healed enough, you can do more strenuous activities with caution. You can golf, but you may want to use a golf cart for some time. And don't wear shoes with spikes.   You can bike on a flat road or on a stationary bike. Talk to your doctor before biking uphill. Your doctor may suggest that you stay away from activities that put stress on your knee. These include tennis, badminton, contact sports like football, jumping (such as in basketball), jogging, and running. Avoid activities where you might fall. Do not sit for more than 1 hour at a time. Get up and walk around for a while before you sit again. If you must sit for a long time, prop up your leg with a chair or footstool. This will help you avoid swelling. Ask your doctor when you can drive again. It may take several weeks after knee replacement surgery before it's safe for you to drive. When you get into a car, sit on the edge of the seat. Then pull in your legs, and turn to face the front. You should be able to do many everyday activities 3 to 6 weeks after your surgery. You will probably need to take 4 to 16 weeks off from work. When you can go back to work depends on the type of work you do and how you feel. Ask your doctor when it is okay for you to have sex. For 12 weeks, do not lift anything heavier than 10 pounds and do not lift weights. Diet    By the time you leave the hospital, you should be eating your normal diet. If your stomach is upset, try bland, low-fat foods like plain rice, broiled chicken, toast, and yogurt. Your doctor may suggest that you take iron and vitamin supplements. Drink plenty of fluids (unless your doctor tells you not to). You may notice that your bowel movements are not regular right after your surgery. This is common. Try to avoid constipation and straining with bowel movements. Drinking enough fluids, taking a stool softener, and eating foods that are good sources of fiber can help you avoid constipation. If you have not had a bowel movement after a couple of days, talk to your doctor. Medicines    Your doctor will tell you if and when you can restart your medicines.  You will also get instructions about taking any new medicines. If you take aspirin or some other blood thinner, ask your doctor if and when to start taking it again. Make sure that you understand exactly what your doctor wants you to do. Your doctor may give you a blood-thinning medicine to prevent blood clots. If you take a blood thinner, be sure you get instructions about how to take your medicine safely. Blood thinners can cause serious bleeding problems. This medicine could be in pill form or as a shot (injection). If a shot is needed, your doctor will tell you how to do this. Be safe with medicines. Take pain medicines exactly as directed. If the doctor gave you a prescription medicine for pain, take it as prescribed. If you are not taking a prescription pain medicine, ask your doctor if you can take an over-the-counter medicine. Plan to take your pain medicine 30 minutes before exercises. It is easier to prevent pain before it starts than to stop it after it has started. If you think your pain medicine is making you sick to your stomach: Take your medicine after meals (unless your doctor has told you not to). Ask your doctor for a different pain medicine. If your doctor prescribed antibiotics, take them as directed. Do not stop taking them just because you feel better. You need to take the full course of antibiotics. Incision care    If your doctor told you how to care for your cut (incision), follow your doctor's instructions. You will have a dressing over the cut. A dressing helps the incision heal and protects it. Your doctor will tell you how to take care of this. If you did not get instructions, follow this general advice: If you have strips of tape on the cut the doctor made, leave the tape on for a week or until it falls off. If you have stitches or staples, your doctor will tell you when to come back to have them removed.   If you have skin glue on the cut, leave it on until it falls off. Skin glue is also called skin adhesive or liquid stitches. Change the bandage every day. Wash the area daily with warm water, and pat it dry. Don't use hydrogen peroxide or alcohol. They can slow healing. You may cover the area with a gauze bandage if it oozes fluid or rubs against clothing. You may shower 24 to 48 hours after surgery. Pat the incision dry. Don't swim or take a bath for the first 2 weeks, or until your doctor tells you it is okay. Exercise    Your rehab program will give you a number of exercises to do to help you get back your knee's range of motion and strength. Always do them as your therapist tells you. Ice    For pain and swelling, put ice or a cold pack on the area for 10 to 20 minutes at a time. Put a thin cloth between the ice and your skin. If your doctor recommended cold therapy using a portable machine, follow the instructions that came with the machine. Other instructions    Wear compression stockings if your doctor told you to. These may help to prevent blood clots. Your doctor will tell you how long you need to keep wearing the compression stockings. Carry a medical alert card that says you have an artificial joint. You have metal pieces in your knee. These may set off some airport metal detectors. Follow-up care is a key part of your treatment and safety. Be sure to make and go to all appointments, and call your doctor if you are having problems. It's also a good idea to know your test results and keep a list of the medicines you take. When should you call for help? Call 911 anytime you think you may need emergency care. For example, call if:    You passed out (lost consciousness). You have severe trouble breathing. You have sudden chest pain and shortness of breath, or you cough up blood. Call your doctor now or seek immediate medical care if:    You have signs of infection, such as: Increased pain, swelling, warmth, or redness.   Red streaks leading from the incision. Pus draining from the incision. A fever. You have signs of a blood clot, such as:  Pain in your calf, back of the knee, thigh, or groin. Redness and swelling in your leg or groin. Your incision comes open and begins to bleed, or the bleeding increases. You have pain that does not get better after you take pain medicine. Watch closely for changes in your health, and be sure to contact your doctor if:    You do not have a bowel movement after taking a laxative. Where can you learn more? Go to https://K2 IntelligencepeYlopoeb.CrossLoop. org and sign in to your Snapsheet account. Enter K559 in the [employer] box to learn more about \"Total Knee Replacement: What to Expect at Home. \"     If you do not have an account, please click on the \"Sign Up Now\" link. Current as of: July 1, 2021               Content Version: 13.1  © 2006-2021 [employer]. Care instructions adapted under license by [facility] ([facility]). If you have questions about a medical condition or this instruction, always ask your healthcare professional. [place] [address] any warranty or liability for your use of this information.

## 2022-07-19 NOTE — PROGRESS NOTES
Physical Therapy    Physical Therapy Initial Evaluation/Plan of Care    Room #:  OR POOL/NONE  Patient Name: Johanny Parikh  YOB: 1965  MRN: [id number]    Date of Service: 7/19/2022     Tentative placement recommendation: Outpatient Physical Therapy beginning 07/20/22  Equipment recommendation: Patient has needed equipment       Evaluating Physical Therapist: Fay Sierra Student Physical Therapist     Specific Provider Orders/Date/Referring Provider : 07/19/22 1115    PT eval and treat  Start:  07/19/22 1115,   End:  07/19/22 1115,   ONE TIME,   Standing Count:  1 Occurrences,   R         K Neil Moffett DO     Admitting Diagnosis:   Synovitis of right knee [M65.9]   Visit diagnosis:   Visit Diagnoses         Codes    Primary osteoarthritis of right knee    -  Primary M17.11    Synovitis of right knee     M65.9    S/P total knee arthroplasty, right     Z96.651            Patient Active Problem List   Diagnosis    Vitamin D deficiency    Dyslipidemia    Mild intermittent asthma without complication    Bee allergy status    Chronic pain syndrome    Lumbar facet arthropathy    Lumbar disc disorder    JOSÉ MIGUEL (obstructive sleep apnea)    Esophagitis    Derangement of posterior horn of medial meniscus due to old tear or injury, left knee    Chondromalacia, left knee    Synovitis of left knee    Anxiety and depression       ASSESSMENT of current deficits: Patient exhibits decreased strength, balance, and endurance impairing functional mobility, transfers, gait , gait distance, and tolerance to activity. Patient remains numb in her hips and buttock region post surgery. Patient requires cueing and mod assist during sit to stand transfers due to not having proprioception at her hips. Patient is also incontinent of urine in the standing position. Patient requires skilled physical therapy to address concerns listed above to increase safety and independence at discharge.      PHYSICAL THERAPY  PLAN OF CARE Physical therapy plan of care is established based on physician order,  patient diagnosis and clinical assessment    Current Treatment Recommendations:  -Standing Balance: Perform strengthening exercises in standing to promote motor control with or without upper extremity support   -Transfers: Provide instruction on proper hand and foot position for adequate transfer of weight onto lower extremities and use of gait device if needed and Cues for hand placement, technique and safety. Provide stabilization to prevent fall   -Gait: Gait training and Standing activities to improve: base of support, weight shift, weight bearing    -Endurance: Utilize Supervised activities to increase level of endurance to allow for safe functional mobility including transfers and gait   -Stairs: Stair training with instruction on proper technique and hand placement on rail    PT long term treatment goals are located in below grid    Patient and or family understand(s) diagnosis, prognosis, and plan of care. Prior Level of Function: Patient ambulated with wheeled walker and with cane in the evenings when her knee was sore. Typically ambulated independently.    Rehab Potential: good  for baseline    Past medical history:   Past Medical History:   Diagnosis Date    Anxiety     Asthma     controlled     Chávez's esophagus     Depression     GERD (gastroesophageal reflux disease)     H/O cardiovascular stress test 04/01/2022    Exercise Treadmill Stress    Sleep apnea     uses cpap    Thyroid disease     newly dx hypothyroid  (no meds ordered yet)     Past Surgical History:   Procedure Laterality Date    ANKLE SURGERY Left     APPENDECTOMY  1986    BREAST SURGERY      bx x2 left neg    CHOLECYSTECTOMY  1993    COLONOSCOPY      ENDOSCOPY, COLON, DIAGNOSTIC      HAND SURGERY Left     HYSTERECTOMY (CERVIX STATUS UNKNOWN)      KNEE ARTHROSCOPY Left 06/11/2020    Arthroscopic Exam and Treatment of left knee    KNEE ARTHROSCOPY Left 6/11/2020    ARTHROSCOPIC EXAM AND TREATMENT LEFT KNEE, MEDIAL MENISECTOMY, CHONDROPASTY AND SYNOVECTOMY performed by Avani Moore DO at [facility] [address]          SUBJECTIVE:    Precautions:  ambulate patient, falls ,Right FWB (full weight bearing) urinary incontinence    Social history: Patient lives with spouse in a ranch home  with 4 steps  to enter with [employer] in shower grab bars    Equipment owned: [address] and [address] [facility] [employer] chair    [address] [facility]   How much difficulty turning over in bed?: None  How much difficulty sitting down on / standing up from a chair with arms?: A Lot  How much difficulty moving from lying on back to sitting on side of bed?: None    Nursing cleared patient for PT evaluation. The admitting diagnosis and active problem list as listed above have been reviewed prior to the initiation of this evaluation. OBJECTIVE:  Was pt agreeable to Eval/treatment? Yes  Goals   Pain Level  6/10   Right knee     Bed Mobility  Rolling: Independent    Supine to sit: Independent    Sit to supine: Independent    Scooting: Independent    Rolling: Independent    Supine to sit: Independent    Sit to supine: Independent    Scooting: Independent     Transfers Sit to stand: Moderate assist of  2 with wheeled walker for initial transfer. Patient incontinent with urine while in standing position.      Sit to stand: Modified Independent     Ambulation     5x2 steps using  wheeled walker with Moderate assist of  2    to perform bed to bedside commode transfer, and then transfer back to bed    100 feet using  wheeled walker with Modified Independent       Stair negotiation: ascended and descended   Not assessed      4 steps 1 rail with supervision    ROM Within functional limits except Right knee 5-85°     Increase range of motion 10% of affected joints    Strength Within functional limits  except  Right lower extremity   Within functional Doffed/donned depends. Instruction knee extension in bed with kneecap and toes pointing to ceiling     At end of session, patient in bed with spouse present call light and phone within reach,  all lines and tubes intact, nursing notified. Patient would benefit from skilled Home Physical Therapy to improve functional independence and quality of life. Patient's/ family goals   home today    Patient and or family understand(s) diagnosis, prognosis, and plan of care. Time in  1346  Time out  1429    Total Treatment Time  23 minutes    Evaluation time includes thorough review of current medical information, gathering information on past medical history/social history and prior level of function, completion of standardized testing/informal observation of tasks, assessment of data, and development of Plan of care and goals.      CPT codes:  Low Complexity PT evaluation (38515)  Therapeutic activities (09618)   12 minutes  1 unit(s)  Therapeutic exercises (31266)   11 minutes  1 unit(s)    Juana Shook, Student Physical Therapist

## 2022-07-19 NOTE — CARE COORDINATION
07/19/22 0956   Social/Functional History   Lives With Spouse   Type of [address] One level   Home Access Stairs to enter with rails   Entrance Stairs - Number of Steps 1 railing   Bathroom Shower/Tub Walk-in shower   Bathroom Equipment Built-in shower seat;3-in-1 commode; [address], [address] Help From Family   7/19/2022: SS Note/Discharge planning:  Met with pt in PAT, pt having surgery for a total knee arthroplasty today 7/19 , explained sw role for transition of care and reviewed rehab options and providers for [facility] insurance, pt plans to return home day of surgery from [facility], pt's  will help her as needed and provide her transportation, pt has or plans to borrow dme needed. Pt prefers to go directly to o/p therapy and already has an appointment scheduled for 7/20 at 11:20am at [facility] in [place], nursing notified of o/p PT script needed at discharge. Electronically signed by RICHARD Baca on 7/19/2022 at 9:55 AM

## 2022-07-19 NOTE — H&P
History and Physical      CHIEF COMPLAINT: Right knee pain    HISTORY OF PRESENT ILLNESS:      Progressive worsening with altered gait and limited endurance    Past Medical History:        Diagnosis Date    Anxiety     Asthma     controlled     Chávez's esophagus     Depression     GERD (gastroesophageal reflux disease)     H/O cardiovascular stress test 04/01/2022    Exercise Treadmill Stress    Sleep apnea     uses cpap    Thyroid disease     newly dx hypothyroid  (no meds ordered yet)     Past Surgical History:        Procedure Laterality Date    ANKLE SURGERY Left     APPENDECTOMY  1986    BREAST SURGERY      bx x2 left neg    CHOLECYSTECTOMY  1993    COLONOSCOPY      ENDOSCOPY, COLON, DIAGNOSTIC      HAND SURGERY Left     HYSTERECTOMY (CERVIX STATUS UNKNOWN)      KNEE ARTHROSCOPY Left 06/11/2020    Arthroscopic Exam and Treatment of left knee    KNEE ARTHROSCOPY Left 6/11/2020    ARTHROSCOPIC EXAM AND TREATMENT LEFT KNEE, MEDIAL MENISECTOMY, CHONDROPASTY AND SYNOVECTOMY performed by Sinan Avery DO at [facility] [address]      Social History:    TOBACCO:   reports that she has never smoked. She has never used smokeless tobacco.  ETOH:   reports current alcohol use. DRUGS:   reports no history of drug use.   Family History:       Problem Relation Age of Onset    No Known Problems Mother     Cancer Father 66        Colon    Other Father         AAA    Hypertension Father     Cancer Brother 47        Lung    COPD Brother      Medications Prior to Admission:  Medications Prior to Admission: levothyroxine (SYNTHROID) 50 MCG tablet, TAKE 1 TABLET BY MOUTH DAILY  NONFORMULARY, D Pam - otc to prevent UTIs  EPINEPHrine (EPIPEN 2-YURIY) 0.3 MG/0.3ML SOAJ injection, Inject 0.3 mLs into the muscle once for 1 dose Use as directed for allergic reaction  albuterol sulfate  (90 Base) MCG/ACT inhaler, Inhale 2 puffs into the lungs 4 times daily as needed for Wheezing  budesonide-formoterol (SYMBICORT) 160-4.5 MCG/ACT AERO, Inhale 2 puffs into the lungs 2 times daily (Patient taking differently: Inhale 2 puffs into the lungs 2 times daily as needed )  pantoprazole (PROTONIX) 40 MG tablet, Take 40 mg by mouth daily   lactulose (CHRONULAC) 10 GM/15ML solution, Take 10 g by mouth daily   VITAMIN D PO, Take 15,000 mg by mouth daily  diazepam (VALIUM) 5 MG tablet, Take 5 mg by mouth nightly as needed for Anxiety. PREBIOTIC PRODUCT PO, Take by mouth daily   Probiotic Product (PROBIOTIC DAILY PO), Take by mouth daily   traZODone (DESYREL) 100 MG tablet, Take 100 mg by mouth nightly   venlafaxine (EFFEXOR XR) 150 MG extended release capsule, Take 150 mg by mouth nightly   Allergies:  Sulfa antibiotics, Vancomycin, and Penicillins    CONSTITUTIONAL:  negative for  chills and anorexia  HEENT:  negative for  tinnitus  RESPIRATORY:  negative for  dyspnea and cyanosis  CARDIOVASCULAR:  negative for  palpitations, syncope  GASTROINTESTINAL:  negative for vomiting and hematemesis  GENITOURINARY:  negative for hematuria  ENDOCRINE:  negative for tremor  MUSCULOSKELETAL: Effusion right knee with limited range of motion pain and altered activity  NEUROLOGICAL:  negative for seizures and syncope,    BEHAVIOR/PSYCH:  negative for agitated and anxiety    PHYSICAL EXAM:  /63   Pulse 77   Temp 98 °F (36.7 °C) (Infrared)   Resp 16   SpO2 94%   General appearance:  awake, alert, cooperative, no apparent distress, and appears stated age  Neurologic:  Awake, alert, oriented to name, place and time. Cranial nerves II-XII are grossly intact. Motor is 5 out of 5 bilaterally. Cerebellar finger to nose, heel to shin intact. Sensory is intact.   Babinski down going, Romberg negative, and gait is normal.  Lungs:  No increased work of breathing, good air exchange, clear to auscultation bilaterally, no crackles or wheezing  Heart:  Normal apical impulse, regular rate and rhythm, normal S1 and S2, no S3 or S4, and no murmur noted  Abdomen:  normal bowel sounds  Skin: warm and dry, no rash or erythema  ENT: tympanic membrane, external ear and ear canal normal bilaterally, oropharynx clear and moist with normal mucous membranes  Musculoskeletal: normal range of motion, no joint swelling, deformity or tenderness , painful range of motion right knee with effusion and crepitus. General Labs:  CBC:   Lab Results   Component Value Date/Time    WBC 5.5 07/07/2022 08:45 AM    RBC 4.60 07/07/2022 08:45 AM    HGB 13.4 07/07/2022 08:45 AM    HCT 41.0 07/07/2022 08:45 AM    MCV 89.1 07/07/2022 08:45 AM    RDW 12.5 07/07/2022 08:45 AM     07/07/2022 08:45 AM     CMP:    Lab Results   Component Value Date/Time     07/07/2022 08:45 AM    K 4.5 07/07/2022 08:45 AM    K 3.6 12/07/2021 09:41 AM     07/07/2022 08:45 AM    CO2 27 07/07/2022 08:45 AM    BUN 19 07/07/2022 08:45 AM    PROT 7.1 07/07/2022 08:45 AM     U/A:  No components found for: Karma Rebollar, USPGRAV, UPH, UPROTEIN, UGLUCOSE, UKETONE, UBILI, UBLOOD, Serge, Jeanine, [place], [facility], [place], [facility], [place], [facility], [place]    Radiology: MRI showing bone edema in the medial femoral condyle and more extensive degenerative changes diffusely than indicated on plain imaging.     ASSESSMENT AND PLAN:    Right total knee replacement arthroplasty      Electronically signed by Frida Mcgrath DO on 7/19/2022 at 8:28 AM

## 2022-07-19 NOTE — PROGRESS NOTES
Physical Therapy    Physical Therapy Treatment Note/Plan of Care    Room #:  OR POOL/NONE  Patient Name: Keaton Jay  YOB: 1965  MRN: [id number]    Date of Service: 7/19/2022     Tentative placement recommendation: Outpatient Physical Therapy beginning 07/20/22  Equipment recommendation: Patient has needed equipment       Evaluating Physical Therapist: Erum Alaniz, Student Physical Therapist     Specific Provider Orders/Date/Referring Provider : 07/19/22 1115    PT eval and treat  Start:  07/19/22 1115,   End:  07/19/22 1115,   ONE TIME,   Standing Count:  1 Occurrences,   R         K Yamileth Scott DO     Admitting Diagnosis:   Synovitis of right knee [M65.9]   Visit diagnosis:   Visit Diagnoses         Codes    Primary osteoarthritis of right knee    -  Primary M17.11    Synovitis of right knee     M65.9    S/P total knee arthroplasty, right     Z96.651            Patient Active Problem List   Diagnosis    Vitamin D deficiency    Dyslipidemia    Mild intermittent asthma without complication    Bee allergy status    Chronic pain syndrome    Lumbar facet arthropathy    Lumbar disc disorder    JOSÉ MIGUEL (obstructive sleep apnea)    Esophagitis    Derangement of posterior horn of medial meniscus due to old tear or injury, left knee    Chondromalacia, left knee    Synovitis of left knee    Anxiety and depression       ASSESSMENT of current deficits: Patient exhibits decreased strength, balance, and endurance impairing functional mobility, transfers, gait , gait distance, and tolerance to activity. PT returned for a second session in the afternoon to work on gait and stair training with pt. Pt was limited by strength and incontinence on 1st session of the day but demonstrated improved strength and did not have any problems with incontinence. Pt was mildly unsteady with gait and stair negotiation with no LOB, dizziness, or lightheadedness.  Pt also stated that he BLE sensation was greatly improved and that she had complete sensation in the bottom of B feet and all toes. Pt ok to be d/adela from a PT standpoint with assistance from  at home. PHYSICAL THERAPY  PLAN OF CARE       Physical therapy plan of care is established based on physician order,  patient diagnosis and clinical assessment    Current Treatment Recommendations:  -Standing Balance: Perform strengthening exercises in standing to promote motor control with or without upper extremity support   -Transfers: Provide instruction on proper hand and foot position for adequate transfer of weight onto lower extremities and use of gait device if needed and Cues for hand placement, technique and safety. Provide stabilization to prevent fall   -Gait: Gait training and Standing activities to improve: base of support, weight shift, weight bearing    -Endurance: Utilize Supervised activities to increase level of endurance to allow for safe functional mobility including transfers and gait   -Stairs: Stair training with instruction on proper technique and hand placement on rail    PT long term treatment goals are located in below grid    Patient and or family understand(s) diagnosis, prognosis, and plan of care. Prior Level of Function: Patient ambulated with wheeled walker and with cane in the evenings when her knee was sore. Typically ambulated independently.    Rehab Potential: good  for baseline    Past medical history:   Past Medical History:   Diagnosis Date    Anxiety     Asthma     controlled     Chávez's esophagus     Depression     GERD (gastroesophageal reflux disease)     H/O cardiovascular stress test 04/01/2022    Exercise Treadmill Stress    Sleep apnea     uses cpap    Thyroid disease     newly dx hypothyroid  (no meds ordered yet)     Past Surgical History:   Procedure Laterality Date    ANKLE SURGERY Left     APPENDECTOMY  1986    BREAST SURGERY      bx x2 left neg    CHOLECYSTECTOMY  1993    COLONOSCOPY      ENDOSCOPY, COLON, DIAGNOSTIC HAND SURGERY Left     HYSTERECTOMY (CERVIX STATUS UNKNOWN)      KNEE ARTHROSCOPY Left 06/11/2020    Arthroscopic Exam and Treatment of left knee    KNEE ARTHROSCOPY Left 6/11/2020    ARTHROSCOPIC EXAM AND TREATMENT LEFT KNEE, MEDIAL MENISECTOMY, CHONDROPASTY AND SYNOVECTOMY performed by Chandler Ramirez DO at [facility] [address]          SUBJECTIVE:    Precautions:  ambulate patient, falls ,Right FWB (full weight bearing) urinary incontinence    Social history: Patient lives with spouse in a ranch home  with 4 steps  to enter with [address] in shower grab bars    Equipment owned: [employer] and [address] [employer] chair    [address] [facility]   How much difficulty turning over in bed?: A Little  How much difficulty sitting down on / standing up from a chair with arms?: A Little  How much difficulty moving from lying on back to sitting on side of bed?: A Little  How much help from another person moving to and from a bed to a chair?: A Little  How much help from another person needed to walk in hospital room?: A Little  How much help from another person for climbing 3-5 steps with a railing?: A Little  AM-PAC Inpatient Mobility Raw Score : 18  AM-PAC Inpatient T-Scale Score : 43.63  Mobility Inpatient CMS 0-100% Score: 46.58  Mobility Inpatient CMS G-Code Modifier : CK    Nursing cleared patient for PT treatment. OBJECTIVE:  Was pt agreeable to Eval/treatment? Yes Y Goals   Pain Level  6/10   Right knee 5/10 R knee    Bed Mobility  Rolling: Independent    Supine to sit: Independent    Sit to supine: Independent    Scooting: Independent   Rolling: Supervision    Supine to sit: Supervision    Sit to supine: Supervision    Scooting: Supervision     Rolling: Independent    Supine to sit: Independent    Sit to supine: Independent    Scooting: Independent     Transfers Sit to stand: Moderate assist of  2 with wheeled walker for initial transfer.  Patient incontinent with urine while in standing position. Sit to stand:  SBA-Yoon  with [employer]  Sit to stand: Modified Independent     Ambulation     5x2 steps using  wheeled walker with Moderate assist of  2    to perform bed to bedside commode transfer, and then transfer back to bed   2 x 100 feet using  wheeled walker with Yoon progressing to Supervision   for walker approximation, balance, upright, and safety   100 feet using  wheeled walker with Modified Independent       Stair negotiation: ascended and descended   Not assessed    4 stairs with 1 HR with SBA/Yoon  4 steps 1 rail with supervision    ROM Within functional limits except Right knee 5-85°     Increase range of motion 10% of affected joints    Strength Within functional limits  except  Right lower extremity   Within functional limits   Balance Sitting EOB:  good   Dynamic Standing:  fair - with wheeled walker on initial stance. Patient had not regained proprioception of hips and required cues for upright. Sitting EOB: good   Dynamic Standing: fair + with [employer]   Sitting EOB:  good    Dynamic Standing:  good wheeled walker      Patient is Alert & Oriented x person, place, time, and situation and follows directions    Sensation:  Patient reports numbness bilateral feet    Edema:  yes right lower extremity   Vitals:  Blood Pressure at rest   Blood Pressure during session     Heart Rate at rest  Heart Rate during session     SPO2 at rest %  SPO2 during session  %     Patient education  Patient educated on role of Physical Therapy, risks of immobility, safety and plan of care,  importance of mobility while in hospital , ankle pumps, quad set and glut set for edema control, blood clot prevention, and importance and purpose of adaptive device and adjusted to proper height for the patient.       Patient response to education:   Pt verbalized understanding Pt demonstrated skill Pt requires further education in this area   Yes Partial Yes       Treatment:  Patient practiced and was instructed in the following treatment:     Therapist educated and facilitated patient on techniques to increase safety and independence with bed mobility, balance, functional transfers, and functional mobility. Assisted to edge of bed,  Sat edge of bed 10 minutes with Independent to increase dynamic sitting balance and activity tolerance. .     Instruction knee extension in bed with kneecap and toes pointing to ceiling     At end of session, patient in bed with spouse present call light and phone within reach,  all lines and tubes intact, nursing notified. Patient would benefit from skilled Home Physical Therapy to improve functional independence and quality of life. Patient's/ family goals   home today    Patient and or family understand(s) diagnosis, prognosis, and plan of care.       Time in  1640  Time out  1713    Total Treatment Time  33 minutes    CPT codes:  Therapeutic activities (48400)   18 minutes  1 unit(s)  Gait Training (57238) 15 minutes 1 unit(s)    Joe Mccann, PT

## 2022-07-25 ENCOUNTER — TELEPHONE (OUTPATIENT)
Dept: ORTHOPEDIC SURGERY | Age: 57
End: 2022-07-25

## 2022-07-25 DIAGNOSIS — Z96.651 S/P TOTAL KNEE ARTHROPLASTY, RIGHT: ICD-10-CM

## 2022-07-25 RX ORDER — OXYCODONE HYDROCHLORIDE AND ACETAMINOPHEN 5; 325 MG/1; MG/1
1 TABLET ORAL EVERY 6 HOURS PRN
Qty: 28 TABLET | Refills: 0 | Status: SHIPPED
Start: 2022-07-25 | End: 2022-08-08 | Stop reason: SDUPTHER

## 2022-07-25 NOTE — TELEPHONE ENCOUNTER
Dr Herbert Manzanares patient    Last appointment 6/27/2022  Next appointment   Future Appointments   Date Time Provider Soha Espinosa   [facility]2022 11:[facility]   10/13/2022  8:00 AM DO ELLIS Stevens      Last refill 7/19/22  DOS: 7/19/22       Patient called in requesting refill of     oxyCODONE-acetaminophen ([address]) 5-325 MG per tablet [[id number]]    Indio Yang [address], [facility] [address] [place] [id number]   Phone:  [phone]  Fax:  [phone]

## 2022-07-28 ENCOUNTER — HOSPITAL ENCOUNTER (EMERGENCY)
Age: 57
Discharge: HOME | End: 2022-07-28
Attending: EMERGENCY MEDICINE
Payer: COMMERCIAL

## 2022-07-28 VITALS
SYSTOLIC BLOOD PRESSURE: 170 MMHG | HEART RATE: 96 BPM | BODY MASS INDEX: 31.15 KG/M2 | WEIGHT: 230 LBS | RESPIRATION RATE: 18 BRPM | OXYGEN SATURATION: 98 % | HEIGHT: 72 IN | TEMPERATURE: 97.9 F | DIASTOLIC BLOOD PRESSURE: 82 MMHG

## 2022-07-28 DIAGNOSIS — N30.01 ACUTE CYSTITIS WITH HEMATURIA: Primary | ICD-10-CM

## 2022-07-28 LAB
BACTERIA: ABNORMAL /HPF
BILIRUBIN URINE: NEGATIVE
BLOOD, URINE: ABNORMAL
CLARITY: ABNORMAL
COLOR: ABNORMAL
EPITHELIAL CELLS, UA: ABNORMAL /HPF
GLUCOSE URINE: NEGATIVE MG/DL
KETONES, URINE: NEGATIVE MG/DL
LEUKOCYTE ESTERASE, URINE: ABNORMAL
NITRITE, URINE: POSITIVE
PH UA: 5.5 (ref 5–9)
PROTEIN UA: 30 MG/DL
RBC UA: >20 /HPF (ref 0–2)
SPECIFIC GRAVITY UA: >=1.03 (ref 1–1.03)
UROBILINOGEN, URINE: 1 E.U./DL
WBC UA: ABNORMAL /HPF (ref 0–5)

## 2022-07-28 PROCEDURE — 99283 EMERGENCY DEPT VISIT LOW MDM: CPT

## 2022-07-28 PROCEDURE — 6370000000 HC RX 637 (ALT 250 FOR IP): Performed by: EMERGENCY MEDICINE

## 2022-07-28 PROCEDURE — 87186 SC STD MICRODIL/AGAR DIL: CPT

## 2022-07-28 PROCEDURE — 87088 URINE BACTERIA CULTURE: CPT

## 2022-07-28 PROCEDURE — 81001 URINALYSIS AUTO W/SCOPE: CPT

## 2022-07-28 RX ORDER — CEFDINIR 300 MG/1
300 CAPSULE ORAL EVERY 12 HOURS SCHEDULED
Status: DISCONTINUED | OUTPATIENT
Start: 2022-07-28 | End: 2022-07-29 | Stop reason: HOSPADM

## 2022-07-28 RX ORDER — CEFDINIR 300 MG/1
300 CAPSULE ORAL 2 TIMES DAILY
Qty: 20 CAPSULE | Refills: 0 | Status: SHIPPED | OUTPATIENT
Start: 2022-07-28 | End: 2022-08-07

## 2022-07-28 RX ADMIN — CEFDINIR 300 MG: 300 CAPSULE ORAL at 22:45

## 2022-07-28 ASSESSMENT — PAIN SCALES - GENERAL: PAINLEVEL_OUTOF10: 4

## 2022-07-28 ASSESSMENT — PAIN DESCRIPTION - ORIENTATION: ORIENTATION: RIGHT

## 2022-07-28 ASSESSMENT — PAIN - FUNCTIONAL ASSESSMENT: PAIN_FUNCTIONAL_ASSESSMENT: 0-10

## 2022-07-28 ASSESSMENT — PAIN DESCRIPTION - PAIN TYPE: TYPE: ACUTE PAIN

## 2022-07-28 ASSESSMENT — PAIN DESCRIPTION - FREQUENCY: FREQUENCY: CONTINUOUS

## 2022-07-28 ASSESSMENT — PAIN DESCRIPTION - DESCRIPTORS: DESCRIPTORS: ACHING

## 2022-07-28 ASSESSMENT — PAIN DESCRIPTION - ONSET: ONSET: SUDDEN

## 2022-07-28 ASSESSMENT — PAIN DESCRIPTION - LOCATION: LOCATION: KNEE

## 2022-07-29 ASSESSMENT — ENCOUNTER SYMPTOMS
VOMITING: 0
NAUSEA: 0

## 2022-07-29 NOTE — ED PROVIDER NOTES
On 7/9, patient underwent knee replacement by Dr. Judy Lopez. She has been recovering well. She is noticed some discolored urine with urinary frequency and urgency. Bowels are moving well. No issues with constipation. No fever or chills. No other complaints. The history is provided by the patient. Dysuria   This is a new problem. The current episode started 12 to 24 hours ago. The problem occurs every urination. The problem has not changed since onset. The quality of the pain is described as burning. The pain is mild. There has been no fever. Associated symptoms include frequency and urgency. Pertinent negatives include no chills, no sweats, no nausea, no vomiting, no discharge, no hematuria, no hesitancy, no possible pregnancy and no flank pain. Review of Systems   Constitutional:  Negative for chills. Gastrointestinal:  Negative for nausea and vomiting. Genitourinary:  Positive for dysuria, frequency and urgency. Negative for flank pain, hematuria and hesitancy. Physical Exam  Vitals and nursing note reviewed. Constitutional:       Appearance: She is well-developed. HENT:      Head: Normocephalic and atraumatic. Eyes:      Pupils: Pupils are equal, round, and reactive to light. Cardiovascular:      Rate and Rhythm: Normal rate and regular rhythm. Heart sounds: Normal heart sounds. No murmur heard. Pulmonary:      Effort: Pulmonary effort is normal. No respiratory distress. Breath sounds: Normal breath sounds. No wheezing or rales. Abdominal:      General: Bowel sounds are normal.      Palpations: Abdomen is soft. Tenderness: There is no abdominal tenderness. There is no guarding or rebound. Musculoskeletal:      Cervical back: Normal range of motion and neck supple. Skin:     General: Skin is warm and dry. Neurological:      Mental Status: She is alert and oriented to person, place, and time. Cranial Nerves: No cranial nerve deficit.       Coordination: Coordination normal.        Procedures     [facility]       --------------------------------------------- PAST HISTORY ---------------------------------------------  Past Medical History:  has a past medical history of Anxiety, Asthma, Chávez's esophagus, Depression, GERD (gastroesophageal reflux disease), H/O cardiovascular stress test, Sleep apnea, and Thyroid disease. Past Surgical History:  has a past surgical history that includes Appendectomy (1986); Hysterectomy; shoulder surgery (Right); Ankle surgery (Left); Cholecystectomy (1993); Hand surgery (Left); Colonoscopy; Endoscopy, colon, diagnostic; Knee arthroscopy (Left, 06/11/2020); Knee arthroscopy (Left, 6/11/2020); Breast surgery; and Total knee arthroplasty (Right, 7/19/2022). Social History:  reports that she has never smoked. She has never used smokeless tobacco. She reports current alcohol use. She reports that she does not use drugs. Family History: family history includes COPD in her brother; Cancer (age of onset: 47) in her brother; Cancer (age of onset: 66) in her father; Hypertension in her father; No Known Problems in her mother; Other in her father. The patients home medications have been reviewed.     Allergies: Sulfa antibiotics, Vancomycin, and Penicillins    -------------------------------------------------- RESULTS -------------------------------------------------  Labs:  Results for orders placed or performed during the hospital encounter of 07/28/22   Urinalysis   Result Value Ref Range    Color, UA DKYELLOW Straw/Yellow    Clarity, UA CLOUDY (A) Clear    Glucose, Ur Negative Negative mg/dL    Bilirubin Urine Negative Negative    Ketones, Urine Negative Negative mg/dL    Specific Gravity, UA >=1.030 1.005 - 1.030    Blood, Urine LARGE (A) Negative    pH, UA 5.5 5.0 - 9.0    Protein, UA 30 (A) Negative mg/dL    Urobilinogen, Urine 1.0 <2.0 E.U./dL    Nitrite, Urine POSITIVE (A) Negative    Leukocyte Esterase, Urine SMALL (A) Disposition:  Patient's disposition: Discharge to home  Patient's condition is stable.        Amanda Charles, DO  07/29/22 0530

## 2022-07-31 LAB
ORGANISM: ABNORMAL
URINE CULTURE, ROUTINE: ABNORMAL

## 2022-08-08 ENCOUNTER — TELEPHONE (OUTPATIENT)
Dept: ORTHOPEDIC SURGERY | Age: 57
End: 2022-08-08

## 2022-08-08 DIAGNOSIS — Z96.651 S/P TOTAL KNEE ARTHROPLASTY, RIGHT: ICD-10-CM

## 2022-08-08 RX ORDER — OXYCODONE HYDROCHLORIDE AND ACETAMINOPHEN 5; 325 MG/1; MG/1
1 TABLET ORAL EVERY 6 HOURS PRN
Qty: 28 TABLET | Refills: 0 | Status: SHIPPED | OUTPATIENT
Start: 2022-08-08 | End: 2022-08-15

## 2022-08-08 NOTE — TELEPHONE ENCOUNTER
Dr Sidney Verduzco pt      Last appointment 6/27/2022  Next appointment   Future Appointments   Date Time Provider Soha Espinosa   8/10/2022 11:00 AM EMANUEL Benz DO [facility]   10/13/2022  8:00 AM DO ELLIS Garcia E      Last refill 07/25/22  DOS: 07/25/22       Patient called in requesting refill of     oxyCODONE-acetaminophen ([address]) 5-325 MG per tablet [[id number]]    [address] [place] [address] [place] [address] [address] [place] [id number]   Phone:  [phone]  Fax:  [phone]

## 2022-08-10 ENCOUNTER — OFFICE VISIT (OUTPATIENT)
Dept: ORTHOPEDIC SURGERY | Age: 57
End: 2022-08-10

## 2022-08-10 VITALS — BODY MASS INDEX: 35.63 KG/M2 | WEIGHT: 227 LBS | TEMPERATURE: 98 F | HEIGHT: 67 IN

## 2022-08-10 DIAGNOSIS — Z96.651 S/P TOTAL KNEE ARTHROPLASTY, RIGHT: Primary | ICD-10-CM

## 2022-08-10 PROCEDURE — 99024 POSTOP FOLLOW-UP VISIT: CPT | Performed by: ORTHOPAEDIC SURGERY

## 2022-08-10 NOTE — PROGRESS NOTES
Chief Complaint:   Chief Complaint   Patient presents with    Post-Op Check     Right knee TKA DOS 7/19/22 doing well, also doing PT and is a little sore today. Brigid Desai follows up 3 weeks postop right total knee replacement arthroplasty. She is doing pretty well. She got immediately to outpatient therapy after surgery. The first few sessions were difficult and she had its gotten better since that time. She is also concerned about having used up with some of her treatments prior to and is trying to maximize benefit accordingly. Allergies; medications; past medical, surgical, family, and social history; and problem list have been reviewed today and updated as indicated in this encounter seen below. Exam: The wounds healing well. Is well approximated and doing well. She is got mild edema. Her calf is supple with no tenderness. There are no signs of complications. Her range of motion is near 0 to 105 degrees or more flexion. Radiographs: Postoperative imaging was shared with the patient. ASSESSMENT:    Itz Quezada was seen today for post-op check. Diagnoses and all orders for this visit:    S/P total knee arthroplasty, right        PLAN: Continue with her therapy and home exercise. We will follow-up in 3 weeks. She has driven today and apparently felt safe doing so. She would also like to go in a pool and do some maintenance therapy afterwards which should be fine. Return in about 3 weeks (around 8/31/2022). Current Outpatient Medications   Medication Sig Dispense Refill    oxyCODONE-acetaminophen (PERCOCET) 5-325 MG per tablet Take 1 tablet by mouth every 6 hours as needed for Pain for up to 7 days. Intended supply: 7 days. Take lowest dose possible to manage pain 28 tablet 0    aspirin 325 MG EC tablet Take 1 tablet by mouth in the morning and 1 tablet in the evening. Take with meals.  60 tablet 0    levothyroxine (SYNTHROID) 50 MCG tablet TAKE 1 TABLET BY MOUTH DAILY 90 tablet 1    NONFORMULARY D Pam - otc to prevent UTIs      albuterol sulfate  (90 Base) MCG/ACT inhaler Inhale 2 puffs into the lungs 4 times daily as needed for Wheezing 3 each 1    budesonide-formoterol (SYMBICORT) 160-4.5 MCG/ACT AERO Inhale 2 puffs into the lungs 2 times daily (Patient taking differently: Inhale 2 puffs into the lungs 2 times daily as needed) 3 each 1    pantoprazole (PROTONIX) 40 MG tablet Take 40 mg by mouth daily       lactulose (CHRONULAC) 10 GM/15ML solution Take 10 g by mouth daily       VITAMIN D PO Take 15,000 mg by mouth daily      diazePAM (VALIUM) 5 MG tablet Take 5 mg by mouth nightly as needed for Anxiety. PREBIOTIC PRODUCT PO Take by mouth daily       Probiotic Product (PROBIOTIC DAILY PO) Take by mouth daily       traZODone (DESYREL) 100 MG tablet Take 100 mg by mouth nightly       venlafaxine (EFFEXOR XR) 150 MG extended release capsule Take 150 mg by mouth nightly       EPINEPHrine (EPIPEN 2-YURIY) 0.3 MG/0.3ML SOAJ injection Inject 0.3 mLs into the muscle once for 1 dose Use as directed for allergic reaction 0.3 mL 2     No current facility-administered medications for this visit.        Patient Active Problem List   Diagnosis    Vitamin D deficiency    Dyslipidemia    Mild intermittent asthma without complication    Bee allergy status    Chronic pain syndrome    Lumbar facet arthropathy    Lumbar disc disorder    JOSÉ MIGUEL (obstructive sleep apnea)    Esophagitis    Derangement of posterior horn of medial meniscus due to old tear or injury, left knee    Chondromalacia, left knee    Synovitis of left knee    Anxiety and depression       Past Medical History:   Diagnosis Date    Anxiety     Asthma     controlled     Chávez's esophagus     Depression     GERD (gastroesophageal reflux disease)     H/O cardiovascular stress test 04/01/2022    Exercise Treadmill Stress    Sleep apnea     uses cpap    Thyroid disease     newly dx hypothyroid  (no meds ordered yet)       Past Surgical History:   Procedure Laterality Date    ANKLE SURGERY Left     APPENDECTOMY  1986    BREAST SURGERY      bx x2 left neg    CHOLECYSTECTOMY  1993    COLONOSCOPY      ENDOSCOPY, COLON, DIAGNOSTIC      HAND SURGERY Left     HYSTERECTOMY (CERVIX STATUS UNKNOWN)      KNEE ARTHROSCOPY Left 06/11/2020    Arthroscopic Exam and Treatment of left knee    KNEE ARTHROSCOPY Left 6/11/2020    ARTHROSCOPIC EXAM AND TREATMENT LEFT KNEE, MEDIAL MENISECTOMY, CHONDROPASTY AND SYNOVECTOMY performed by Lui Velez DO at [address] Right     TOTAL KNEE ARTHROPLASTY Right 7/19/2022    TOTAL RIGHT KNEE REPLACEMENT  ARTHROPLASTY (MARJORIE) performed by Lui Velez DO at [address]       Allergies   Allergen Reactions    Sulfa Antibiotics Itching    Vancomycin Itching    Penicillins Rash       Social History     Socioeconomic History    Marital status:      Spouse name: None    Number of children: 2    Years of education: None    Highest education level: None   Occupational History     Employer: [employer]    Tobacco Use    Smoking status: Never    Smokeless tobacco: Never   Vaping Use    Vaping Use: Never used   Substance and Sexual Activity    Alcohol use: Yes     Comment: occas    Drug use: No     Social Determinants of Health     Financial Resource Strain: Low Risk     Difficulty of Paying Living Expenses: Not hard at all   Food Insecurity: No Food Insecurity    Worried About Running Out of Food in the Last Year: Never true    Ran Out of Food in the Last Year: Never true       Review of Systems  As follows except as previously noted in HPI:  Constitutional: Negative for chills, diaphoresis, fatigue, fever and unexpected weight change. Respiratory: Negative for cough, shortness of breath and wheezing. Cardiovascular: Negative for chest pain and palpitations. Neurological: Negative for dizziness, syncope, cephalgia.   GI / : negative  Musculoskeletal: see HPI

## 2022-08-11 ENCOUNTER — TELEPHONE (OUTPATIENT)
Dept: ORTHOPEDIC SURGERY | Age: 57
End: 2022-08-11

## 2022-08-11 NOTE — TELEPHONE ENCOUNTER
Carla Goyal called and stated she broke her tooth. Her dentist will be fixing it on Tues, 08/16/2022. Keflex 500 mg #20 --  4 tablets one hour prior to dental appointment was called into [facility].

## 2022-08-26 DIAGNOSIS — Z96.651 S/P TOTAL KNEE ARTHROPLASTY, RIGHT: Primary | ICD-10-CM

## 2022-08-26 RX ORDER — HYDROCODONE BITARTRATE AND ACETAMINOPHEN 5; 325 MG/1; MG/1
1 TABLET ORAL EVERY 6 HOURS PRN
Qty: 28 TABLET | Refills: 0 | Status: SHIPPED | OUTPATIENT
Start: 2022-08-26 | End: 2022-09-02

## 2022-08-31 ENCOUNTER — OFFICE VISIT (OUTPATIENT)
Dept: ORTHOPEDIC SURGERY | Age: 57
End: 2022-08-31

## 2022-08-31 VITALS — TEMPERATURE: 98 F | HEIGHT: 67 IN | BODY MASS INDEX: 36.1 KG/M2 | WEIGHT: 230 LBS

## 2022-08-31 DIAGNOSIS — Z96.651 S/P TOTAL KNEE ARTHROPLASTY, RIGHT: Primary | ICD-10-CM

## 2022-08-31 PROCEDURE — 99024 POSTOP FOLLOW-UP VISIT: CPT | Performed by: ORTHOPAEDIC SURGERY

## 2022-08-31 NOTE — PROGRESS NOTES
Chief Complaint:   Chief Complaint   Patient presents with    Knee Pain     F/u right knee TKA DOS: 7/19/22. Patient had a fall on Sunday but states no injury to the knee but has aggravated her back. Arash Crockett follows about 6 weeks postop right total knee replacement arthroplasty. She is doing fairly well with regards to the knee. Having some other aches and pains. She states that she is taking 1 pain pill a day. He is also taking some ibuprofen or Aleve and Tylenol. A long discussion about pain medications though did not request any today saying that we had already prescribed pain medication for her. She is going to therapy and they are trying to get her extended to improve her strength ability and endurance. She has particular problems going downstairs and sick with squatting type activities. She is using a cane at intervals. Allergies; medications; past medical, surgical, family, and social history; and problem list have been reviewed today and updated as indicated in this encounter seen below. Exam: Gait is slow and protects right lower extremity somewhat. Range of motion of the right knee is 0 to 105 degrees more flexion. She has stable collaterals and has good dynamic anterior and posterior stability. Overall alignment is good. Calf supple with no tenderness. Radiographs: None    ASSESSMENT:    Carla Goyal was seen today for knee pain. Diagnoses and all orders for this visit:    S/P total knee arthroplasty, right        PLAN: Continue with the patient physical therapy and home exercise as instructed. Needs to be diligent about her exercise to regain her strength and stability. We will follow in 6 weeks. Return in about 6 weeks (around 10/12/2022). Current Outpatient Medications   Medication Sig Dispense Refill    HYDROcodone-acetaminophen (NORCO) 5-325 MG per tablet Take 1 tablet by mouth every 6 hours as needed for Pain (pain) for up to 7 days.  28 tablet 0 aspirin 325 MG EC tablet Take 1 tablet by mouth in the morning and 1 tablet in the evening. Take with meals. 60 tablet 0    levothyroxine (SYNTHROID) 50 MCG tablet TAKE 1 TABLET BY MOUTH DAILY 90 tablet 1    NONFORMULARY D Pam - otc to prevent UTIs      EPINEPHrine (EPIPEN 2-YURIY) 0.3 MG/0.3ML SOAJ injection Inject 0.3 mLs into the muscle once for 1 dose Use as directed for allergic reaction 0.3 mL 2    albuterol sulfate  (90 Base) MCG/ACT inhaler Inhale 2 puffs into the lungs 4 times daily as needed for Wheezing 3 each 1    budesonide-formoterol (SYMBICORT) 160-4.5 MCG/ACT AERO Inhale 2 puffs into the lungs 2 times daily (Patient taking differently: Inhale 2 puffs into the lungs 2 times daily as needed) 3 each 1    pantoprazole (PROTONIX) 40 MG tablet Take 40 mg by mouth daily       lactulose (CHRONULAC) 10 GM/15ML solution Take 10 g by mouth daily       VITAMIN D PO Take 15,000 mg by mouth daily      diazePAM (VALIUM) 5 MG tablet Take 5 mg by mouth nightly as needed for Anxiety. PREBIOTIC PRODUCT PO Take by mouth daily       Probiotic Product (PROBIOTIC DAILY PO) Take by mouth daily       traZODone (DESYREL) 100 MG tablet Take 100 mg by mouth nightly       venlafaxine (EFFEXOR XR) 150 MG extended release capsule Take 150 mg by mouth nightly        No current facility-administered medications for this visit.        Patient Active Problem List   Diagnosis    Vitamin D deficiency    Dyslipidemia    Mild intermittent asthma without complication    Bee allergy status    Chronic pain syndrome    Lumbar facet arthropathy    Lumbar disc disorder    JOSÉ MIGUEL (obstructive sleep apnea)    Esophagitis    Derangement of posterior horn of medial meniscus due to old tear or injury, left knee    Chondromalacia, left knee    Synovitis of left knee    Anxiety and depression       Past Medical History:   Diagnosis Date    Anxiety     Asthma     controlled     Chávez's esophagus     Depression     GERD (gastroesophageal reflux disease)     H/O cardiovascular stress test 04/01/2022    Exercise Treadmill Stress    Sleep apnea     uses cpap    Thyroid disease     newly dx hypothyroid  (no meds ordered yet)       Past Surgical History:   Procedure Laterality Date    ANKLE SURGERY Left     APPENDECTOMY  1986    BREAST SURGERY      bx x2 left neg    CHOLECYSTECTOMY  1993    COLONOSCOPY      ENDOSCOPY, COLON, DIAGNOSTIC      HAND SURGERY Left     HYSTERECTOMY (CERVIX STATUS UNKNOWN)      KNEE ARTHROSCOPY Left 06/11/2020    Arthroscopic Exam and Treatment of left knee    KNEE ARTHROSCOPY Left 6/11/2020    ARTHROSCOPIC EXAM AND TREATMENT LEFT KNEE, MEDIAL MENISECTOMY, CHONDROPASTY AND SYNOVECTOMY performed by Flavia Blackwell DO at [address] Right     TOTAL KNEE ARTHROPLASTY Right 7/19/2022    TOTAL RIGHT KNEE REPLACEMENT  ARTHROPLASTY (MARJORIE) performed by Flavia Blakcwell DO at [address]       Allergies   Allergen Reactions    Sulfa Antibiotics Itching    Vancomycin Itching    Penicillins Rash       Social History     Socioeconomic History    Marital status:      Spouse name: None    Number of children: 2    Years of education: None    Highest education level: None   Occupational History     Employer: [id number]    Tobacco Use    Smoking status: Never    Smokeless tobacco: Never   Vaping Use    Vaping Use: Never used   Substance and Sexual Activity    Alcohol use: Yes     Comment: occas    Drug use: No     Social Determinants of Health     Financial Resource Strain: Low Risk     Difficulty of Paying Living Expenses: Not hard at all   Food Insecurity: No Food Insecurity    Worried About Running Out of Food in the Last Year: Never true    Ran Out of Food in the Last Year: Never true       Review of Systems  As follows except as previously noted in HPI:  Constitutional: Negative for chills, diaphoresis, fatigue, fever and unexpected weight change.    Respiratory: Negative for cough, shortness of breath and wheezing. Cardiovascular: Negative for chest pain and palpitations. Neurological: Negative for dizziness, syncope, cephalgia. GI / : negative  Musculoskeletal: see HPI       Objective:   Physical Exam   Constitutional: Oriented to person, place, and time. and appears well-developed and well-nourished. :   Head: Normocephalic and atraumatic. Eyes: EOM are normal.   Neck: Neck supple. Cardiovascular: Normal rate and regular rhythm. Pulmonary/Chest: Effort normal. No stridor. No respiratory distress, no wheezes. Abdominal:  No abnormal distension. Neurological: Alert and oriented to person, place, and time. Skin: Skin is warm and dry. Psychiatric: Normal mood and affect.  Behavior is normal. Thought content normal.    EMANUEL Christian, DO    8/31/22  11:46 AM

## 2022-09-07 ENCOUNTER — HOSPITAL ENCOUNTER (OUTPATIENT)
Dept: GENERAL RADIOLOGY | Age: 57
Discharge: HOME | End: 2022-09-09
Payer: COMMERCIAL

## 2022-09-07 VITALS — HEIGHT: 66 IN | WEIGHT: 230 LBS | BODY MASS INDEX: 36.96 KG/M2

## 2022-09-07 DIAGNOSIS — Z12.31 VISIT FOR SCREENING MAMMOGRAM: ICD-10-CM

## 2022-09-07 PROCEDURE — 77063 BREAST TOMOSYNTHESIS BI: CPT

## 2022-09-23 ENCOUNTER — OFFICE VISIT (OUTPATIENT)
Dept: ORTHOPEDIC SURGERY | Age: 57
End: 2022-09-23

## 2022-09-23 VITALS — TEMPERATURE: 98 F | HEIGHT: 66 IN | WEIGHT: 230 LBS | BODY MASS INDEX: 36.96 KG/M2

## 2022-09-23 DIAGNOSIS — Z96.651 S/P TOTAL KNEE ARTHROPLASTY, RIGHT: Primary | ICD-10-CM

## 2022-09-23 PROCEDURE — 99024 POSTOP FOLLOW-UP VISIT: CPT | Performed by: ORTHOPAEDIC SURGERY

## 2022-09-23 RX ORDER — HYDROCODONE BITARTRATE AND ACETAMINOPHEN 5; 325 MG/1; MG/1
1 TABLET ORAL EVERY 6 HOURS PRN
Qty: 20 TABLET | Refills: 0 | Status: SHIPPED | OUTPATIENT
Start: 2022-09-23 | End: 2022-09-30

## 2022-09-23 NOTE — PROGRESS NOTES
Chief Complaint:   Chief Complaint   Patient presents with    Knee Pain     Right TKA DOS 07/19/2022,  states of knee buckling last night. Seen PT and they put a lift in her left shoe. Macario Jennings is about 9 weeks postop right total knee replacement arthroplasty. She has issues with her right knee. She had some problems with her therapy and was told she was not where she should be. Apparently the therapist told her that her right lower extremity is longer than her left. There is no special study done to document this or confirm it. She had a lift put in her left shoe and this has helped her and is decreased the discomfort which she says is in her IT band and the right side. Jocelyne Cai also states that she would like to have had some analgesic available to her for occasional use. Her surgical report was reviewed and this indicates a 13 mm tibial bearing is only 4 mm thicker than the thinness possible bearing. Allergies; medications; past medical, surgical, family, and social history; and problem list have been reviewed today and updated as indicated in this encounter seen below. Exam: The wound is well-healed. Scar is a little bit thick. There is no signs of complications with. The alignment of her right knee is good. Stability is good and collaterals. She has good anterior and posterior dynamic stability. Range of motion 0 to 110 degrees or more flexion. Her calf is supple with no tenderness. Radiographs: Previous imaging was reviewed and alignment of the knee components is good. ASSESSMENT:    Jocelyne Cai was seen today for knee pain. Diagnoses and all orders for this visit:    S/P total knee arthroplasty, right        PLAN: Continue with physical therapy as scheduled and home exercise as instructed. She should continue to use the left heel lift if beneficial from a symptomatic standpoint.   She is interested in an actual leg length determination this would require radiographic studies to determine. Return in about 4 weeks (around 10/21/2022). Current Outpatient Medications   Medication Sig Dispense Refill    SYMBICORT 160-4.5 MCG/ACT AERO INHALE 2 PUFFS INTO THE LUNGS TWICE DAILY 30.6 g 3    levothyroxine (SYNTHROID) 50 MCG tablet TAKE 1 TABLET BY MOUTH DAILY 90 tablet 1    NONFORMULARY D Pam - otc to prevent UTIs      albuterol sulfate  (90 Base) MCG/ACT inhaler Inhale 2 puffs into the lungs 4 times daily as needed for Wheezing 3 each 1    pantoprazole (PROTONIX) 40 MG tablet Take 40 mg by mouth daily       lactulose (CHRONULAC) 10 GM/15ML solution Take 10 g by mouth daily       VITAMIN D PO Take 15,000 mg by mouth daily      diazePAM (VALIUM) 5 MG tablet Take 5 mg by mouth nightly as needed for Anxiety. PREBIOTIC PRODUCT PO Take by mouth daily       Probiotic Product (PROBIOTIC DAILY PO) Take by mouth daily       traZODone (DESYREL) 100 MG tablet Take 100 mg by mouth nightly       venlafaxine (EFFEXOR XR) 150 MG extended release capsule Take 150 mg by mouth nightly       aspirin 325 MG EC tablet Take 1 tablet by mouth in the morning and 1 tablet in the evening. Take with meals. 60 tablet 0    EPINEPHrine (EPIPEN 2-YURIY) 0.3 MG/0.3ML SOAJ injection Inject 0.3 mLs into the muscle once for 1 dose Use as directed for allergic reaction 0.3 mL 2     No current facility-administered medications for this visit.        Patient Active Problem List   Diagnosis    Vitamin D deficiency    Dyslipidemia    Mild intermittent asthma without complication    Bee allergy status    Chronic pain syndrome    Lumbar facet arthropathy    Lumbar disc disorder    JOSÉ MIGUEL (obstructive sleep apnea)    Esophagitis    Derangement of posterior horn of medial meniscus due to old tear or injury, left knee    Chondromalacia, left knee    Synovitis of left knee    Anxiety and depression       Past Medical History:   Diagnosis Date    Anxiety     Asthma     controlled     Chávez's esophagus     Depression

## 2022-10-10 DIAGNOSIS — F32.A ANXIETY AND DEPRESSION: ICD-10-CM

## 2022-10-10 DIAGNOSIS — F41.9 ANXIETY AND DEPRESSION: ICD-10-CM

## 2022-10-10 DIAGNOSIS — E03.8 SUBCLINICAL HYPOTHYROIDISM: ICD-10-CM

## 2022-10-10 DIAGNOSIS — E78.5 DYSLIPIDEMIA: ICD-10-CM

## 2022-10-10 DIAGNOSIS — R07.9 CHEST PAIN, UNSPECIFIED TYPE: ICD-10-CM

## 2022-10-10 DIAGNOSIS — E55.9 VITAMIN D DEFICIENCY: ICD-10-CM

## 2022-10-10 DIAGNOSIS — Z20.822 EXPOSURE TO COVID-19 VIRUS: ICD-10-CM

## 2022-10-10 DIAGNOSIS — J45.20 MILD INTERMITTENT ASTHMA WITHOUT COMPLICATION: ICD-10-CM

## 2022-10-10 DIAGNOSIS — R73.03 PREDIABETES: ICD-10-CM

## 2022-10-10 LAB
ALBUMIN SERPL-MCNC: 4.3 G/DL (ref 3.5–5.2)
ALP BLD-CCNC: 90 U/L (ref 35–104)
ALT SERPL-CCNC: 26 U/L (ref 0–32)
ANION GAP SERPL CALCULATED.3IONS-SCNC: 11 MMOL/L (ref 7–16)
AST SERPL-CCNC: 22 U/L (ref 0–31)
BASOPHILS ABSOLUTE: 0.03 E9/L (ref 0–0.2)
BASOPHILS RELATIVE PERCENT: 0.7 % (ref 0–2)
BILIRUB SERPL-MCNC: <0.2 MG/DL (ref 0–1.2)
BUN BLDV-MCNC: 16 MG/DL (ref 6–20)
CALCIUM SERPL-MCNC: 9.2 MG/DL (ref 8.6–10.2)
CHLORIDE BLD-SCNC: 104 MMOL/L (ref 98–107)
CHOLESTEROL, TOTAL: 262 MG/DL (ref 0–199)
CO2: 26 MMOL/L (ref 22–29)
CREAT SERPL-MCNC: 0.8 MG/DL (ref 0.5–1)
EOSINOPHILS ABSOLUTE: 0.17 E9/L (ref 0.05–0.5)
EOSINOPHILS RELATIVE PERCENT: 3.8 % (ref 0–6)
GFR AFRICAN AMERICAN: >60
GFR NON-AFRICAN AMERICAN: >60 ML/MIN/1.73
GLUCOSE BLD-MCNC: 127 MG/DL (ref 74–99)
HBA1C MFR BLD: 6.2 % (ref 4–5.6)
HCT VFR BLD CALC: 42.4 % (ref 34–48)
HDLC SERPL-MCNC: 52 MG/DL
HEMOGLOBIN: 13.9 G/DL (ref 11.5–15.5)
IMMATURE GRANULOCYTES #: 0.02 E9/L
IMMATURE GRANULOCYTES %: 0.5 % (ref 0–5)
LDL CHOLESTEROL CALCULATED: 179 MG/DL (ref 0–99)
LYMPHOCYTES ABSOLUTE: 1.69 E9/L (ref 1.5–4)
LYMPHOCYTES RELATIVE PERCENT: 38.2 % (ref 20–42)
MCH RBC QN AUTO: 29.7 PG (ref 26–35)
MCHC RBC AUTO-ENTMCNC: 32.8 % (ref 32–34.5)
MCV RBC AUTO: 90.6 FL (ref 80–99.9)
MONOCYTES ABSOLUTE: 0.35 E9/L (ref 0.1–0.95)
MONOCYTES RELATIVE PERCENT: 7.9 % (ref 2–12)
NEUTROPHILS ABSOLUTE: 2.16 E9/L (ref 1.8–7.3)
NEUTROPHILS RELATIVE PERCENT: 48.9 % (ref 43–80)
PDW BLD-RTO: 13.2 FL (ref 11.5–15)
PLATELET # BLD: 229 E9/L (ref 130–450)
PMV BLD AUTO: 11.4 FL (ref 7–12)
POTASSIUM SERPL-SCNC: 4.7 MMOL/L (ref 3.5–5)
RBC # BLD: 4.68 E12/L (ref 3.5–5.5)
SARS-COV-2 ANTIBODY, TOTAL: REACTIVE
SODIUM BLD-SCNC: 141 MMOL/L (ref 132–146)
T4 FREE: 1.11 NG/DL (ref 0.93–1.7)
TOTAL PROTEIN: 7.2 G/DL (ref 6.4–8.3)
TRIGL SERPL-MCNC: 157 MG/DL (ref 0–149)
TSH SERPL DL<=0.05 MIU/L-ACNC: 2.36 UIU/ML (ref 0.27–4.2)
VITAMIN D 25-HYDROXY: 73 NG/ML (ref 30–100)
VLDLC SERPL CALC-MCNC: 31 MG/DL
WBC # BLD: 4.4 E9/L (ref 4.5–11.5)

## 2022-10-20 PROBLEM — R73.03 PREDIABETES: Status: ACTIVE | Noted: 2022-10-20

## 2022-10-20 PROBLEM — J45.40 MODERATE PERSISTENT ASTHMA WITHOUT COMPLICATION: Status: ACTIVE | Noted: 2022-10-20

## 2022-10-20 PROBLEM — E03.9 ACQUIRED HYPOTHYROIDISM: Status: ACTIVE | Noted: 2022-10-20

## 2022-10-21 ENCOUNTER — OFFICE VISIT (OUTPATIENT)
Dept: ORTHOPEDIC SURGERY | Age: 57
End: 2022-10-21

## 2022-10-21 VITALS — HEIGHT: 66 IN | TEMPERATURE: 98 F | WEIGHT: 230 LBS | BODY MASS INDEX: 36.96 KG/M2

## 2022-10-21 DIAGNOSIS — Z96.651 S/P TOTAL KNEE ARTHROPLASTY, RIGHT: Primary | ICD-10-CM

## 2022-10-21 PROCEDURE — 99024 POSTOP FOLLOW-UP VISIT: CPT | Performed by: ORTHOPAEDIC SURGERY

## 2022-10-21 NOTE — PROGRESS NOTES
Chief Complaint:   Chief Complaint   Patient presents with    Knee Pain     Right Knee TKA DOS 07/19/2022, states of pain on the medial side of the knee when she is walking. Pain shoots up the leg        Kina West post 3 months postop right total knee replacement arthroplasty. She still complaining of pain in the lateral side of her knee. She says it swells and feels like it is moving to the side when she goes downstairs. She is fine going up stairs. She has used TENS unit on the knee on the lateral side. She says she gets IT band pain. Allergies; medications; past medical, surgical, family, and social history; and problem list have been reviewed today and updated as indicated in this encounter seen below. Exam: Most of the discomfort seems to be lateral in the knee and seems to be the worst just below the joint line. There is no redness and minimal swelling at this time. She does have 2 TENS pads on there. Range of motion of the knee is 0 to 120 degrees of flexion. She has good stability in the knee overall. There is no crepitus with range of motion. Radiographs: Postoperative imaging shows a well aligned cemented total knee replacement arthroplasty. ASSESSMENT:    Radha was seen today for knee pain. Diagnoses and all orders for this visit:    S/P total knee arthroplasty, right        PLAN: TENS use at her discretion. She also asked about topical preparations and these were reviewed with her including Voltaren, Aleve, capsaicin and the menthol-based products. She needs to continue with her exercises. Obviously her range of motion and stability are good. 's is possibly sensory nerve related but its not possible to confirm that at this time. Return in about 4 weeks (around 11/18/2022).        Current Outpatient Medications   Medication Sig Dispense Refill    SYMBICORT 160-4.5 MCG/ACT AERO INHALE 2 PUFFS INTO THE LUNGS TWICE DAILY 30.6 g 3    levothyroxine (SYNTHROID) 50 MCG tablet TAKE 1 TABLET BY MOUTH DAILY 90 tablet 1    NONFORMULARY D Pam - otc to prevent UTIs      albuterol sulfate  (90 Base) MCG/ACT inhaler Inhale 2 puffs into the lungs 4 times daily as needed for Wheezing 3 each 1    pantoprazole (PROTONIX) 20 MG tablet Take 20 mg by mouth daily      lactulose (CHRONULAC) 10 GM/15ML solution Take 10 g by mouth daily       VITAMIN D PO Take 15,000 mg by mouth daily      diazePAM (VALIUM) 5 MG tablet Take 5 mg by mouth nightly as needed for Anxiety. PREBIOTIC PRODUCT PO Take by mouth daily       Probiotic Product (PROBIOTIC DAILY PO) Take by mouth daily       traZODone (DESYREL) 100 MG tablet Take 100 mg by mouth nightly       venlafaxine (EFFEXOR XR) 150 MG extended release capsule Take 150 mg by mouth nightly       EPINEPHrine (EPIPEN 2-YURIY) 0.3 MG/0.3ML SOAJ injection Inject 0.3 mLs into the muscle once for 1 dose Use as directed for allergic reaction 0.3 mL 2     No current facility-administered medications for this visit.        Patient Active Problem List   Diagnosis    Vitamin D deficiency    Dyslipidemia    Mild intermittent asthma without complication    Bee allergy status    Chronic pain syndrome    Lumbar facet arthropathy    Lumbar disc disorder    JOSÉ MIGUEL (obstructive sleep apnea)    Esophagitis    Derangement of posterior horn of medial meniscus due to old tear or injury, left knee    Chondromalacia, left knee    Synovitis of left knee    Anxiety and depression    Acquired hypothyroidism    Moderate persistent asthma without complication    Prediabetes       Past Medical History:   Diagnosis Date    Anxiety     Asthma     controlled     Chávez's esophagus     Depression     GERD (gastroesophageal reflux disease)     H/O cardiovascular stress test 04/01/2022    Exercise Treadmill Stress    Sleep apnea     uses cpap    Thyroid disease     newly dx hypothyroid  (no meds ordered yet)       Past Surgical History:   Procedure Laterality Date    ANKLE SURGERY Left APPENDECTOMY  1986    BREAST SURGERY      bx x2 left neg    CHOLECYSTECTOMY  1993    COLONOSCOPY      ENDOSCOPY, COLON, DIAGNOSTIC      HAND SURGERY Left     HYSTERECTOMY (CERVIX STATUS UNKNOWN)      KNEE ARTHROSCOPY Left 06/11/2020    Arthroscopic Exam and Treatment of left knee    KNEE ARTHROSCOPY Left 6/11/2020    ARTHROSCOPIC EXAM AND TREATMENT LEFT KNEE, MEDIAL MENISECTOMY, CHONDROPASTY AND SYNOVECTOMY performed by Rob Hui DO at [address] Right     TOTAL KNEE ARTHROPLASTY Right 7/19/2022    TOTAL RIGHT KNEE REPLACEMENT  ARTHROPLASTY (MARJORIE) performed by Rob Hui DO at [address]       Allergies   Allergen Reactions    Sulfa Antibiotics Itching    Vancomycin Itching    Penicillins Rash       Social History     Socioeconomic History    Marital status:      Spouse name: None    Number of children: 2    Years of education: None    Highest education level: None   Occupational History     Employer: [employer]    Tobacco Use    Smoking status: Never    Smokeless tobacco: Never   Vaping Use    Vaping Use: Never used   Substance and Sexual Activity    Alcohol use: Yes     Comment: occas    Drug use: No     Social Determinants of Health     Financial Resource Strain: Low Risk     Difficulty of Paying Living Expenses: Not hard at all   Food Insecurity: No Food Insecurity    Worried About Running Out of Food in the Last Year: Never true    Ran Out of Food in the Last Year: Never true       Review of Systems  As follows except as previously noted in HPI:  Constitutional: Negative for chills, diaphoresis, fatigue, fever and unexpected weight change. Respiratory: Negative for cough, shortness of breath and wheezing. Cardiovascular: Negative for chest pain and palpitations. Neurological: Negative for dizziness, syncope, cephalgia.   GI / : negative  Musculoskeletal: see HPI       Objective:   Physical Exam   Constitutional: Oriented to person, place, and time. and appears well-developed and well-nourished. :   Head: Normocephalic and atraumatic. Eyes: EOM are normal.   Neck: Neck supple. Cardiovascular: Normal rate and regular rhythm. Pulmonary/Chest: Effort normal. No stridor. No respiratory distress, no wheezes. Abdominal:  No abnormal distension. Neurological: Alert and oriented to person, place, and time. Skin: Skin is warm and dry. Psychiatric: Normal mood and affect.  Behavior is normal. Thought content normal.    EMANUEL Carter DO    10/21/22  10:56 AM

## 2022-11-21 ENCOUNTER — OFFICE VISIT (OUTPATIENT)
Dept: ORTHOPEDIC SURGERY | Age: 57
End: 2022-11-21
Payer: COMMERCIAL

## 2022-11-21 VITALS — TEMPERATURE: 98 F | BODY MASS INDEX: 36.96 KG/M2 | HEIGHT: 66 IN | WEIGHT: 230 LBS

## 2022-11-21 DIAGNOSIS — Z96.651 S/P TOTAL KNEE ARTHROPLASTY, RIGHT: Primary | ICD-10-CM

## 2022-11-21 PROCEDURE — 99213 OFFICE O/P EST LOW 20 MIN: CPT | Performed by: ORTHOPAEDIC SURGERY

## 2022-11-21 PROCEDURE — G8484 FLU IMMUNIZE NO ADMIN: HCPCS | Performed by: ORTHOPAEDIC SURGERY

## 2022-11-21 PROCEDURE — G8417 CALC BMI ABV UP PARAM F/U: HCPCS | Performed by: ORTHOPAEDIC SURGERY

## 2022-11-21 PROCEDURE — 1036F TOBACCO NON-USER: CPT | Performed by: ORTHOPAEDIC SURGERY

## 2022-11-21 PROCEDURE — G8427 DOCREV CUR MEDS BY ELIG CLIN: HCPCS | Performed by: ORTHOPAEDIC SURGERY

## 2022-11-21 PROCEDURE — 3017F COLORECTAL CA SCREEN DOC REV: CPT | Performed by: ORTHOPAEDIC SURGERY

## 2022-11-21 NOTE — PROGRESS NOTES
Chief Complaint:   Chief Complaint   Patient presents with    Knee Pain     Right TKA DOS 07/19/2022, doing better. Seeing a chiropractor       Areavlojamie Simental is up 4 months postop right total knee replacement arthroplasty. She is reached a point that she is pleased with the results. She does wear a soft sleeve support and she has been going to her chiropractor for some spinal adjustments and also to physical therapy for some water therapy to strengthen her core. She feels this is helped her a lot. Allergies; medications; past medical, surgical, family, and social history; and problem list have been reviewed today and updated as indicated in this encounter seen below. Exam: Incisions well-healed. Range of motion of the right knee is 0 to 110 degrees or more flexion. She is functional stability in the knee. Patellofemoral alignment is good. Calf is supple with no tenderness. Radiographs: None    ASSESSMENT:    [employer] was seen today for knee pain. Diagnoses and all orders for this visit:    S/P total knee arthroplasty, right        PLAN: Continue with the exercises and other treatments as found to be effective. We will follow-up in 2 months and x-ray of the right knee. Return in about 2 months (around 1/21/2023) for R knee X.        Current Outpatient Medications   Medication Sig Dispense Refill    SYMBICORT 160-4.5 MCG/ACT AERO INHALE 2 PUFFS INTO THE LUNGS TWICE DAILY 30.6 g 3    levothyroxine (SYNTHROID) 50 MCG tablet TAKE 1 TABLET BY MOUTH DAILY 90 tablet 1    NONFORMULARY D Pam - otc to prevent UTIs      albuterol sulfate  (90 Base) MCG/ACT inhaler Inhale 2 puffs into the lungs 4 times daily as needed for Wheezing 3 each 1    pantoprazole (PROTONIX) 20 MG tablet Take 20 mg by mouth daily      lactulose (CHRONULAC) 10 GM/15ML solution Take 10 g by mouth daily       VITAMIN D PO Take 15,000 mg by mouth daily      diazePAM (VALIUM) 5 MG tablet Take 5 mg by mouth nightly as needed for Anxiety. PREBIOTIC PRODUCT PO Take by mouth daily       Probiotic Product (PROBIOTIC DAILY PO) Take by mouth daily       traZODone (DESYREL) 100 MG tablet Take 100 mg by mouth nightly       venlafaxine (EFFEXOR XR) 150 MG extended release capsule Take 150 mg by mouth nightly       EPINEPHrine (EPIPEN 2-YURIY) 0.3 MG/0.3ML SOAJ injection Inject 0.3 mLs into the muscle once for 1 dose Use as directed for allergic reaction 0.3 mL 2     No current facility-administered medications for this visit.        Patient Active Problem List   Diagnosis    Vitamin D deficiency    Dyslipidemia    Mild intermittent asthma without complication    Bee allergy status    Chronic pain syndrome    Lumbar facet arthropathy    Lumbar disc disorder    JOSÉ MIGUEL (obstructive sleep apnea)    Esophagitis    Derangement of posterior horn of medial meniscus due to old tear or injury, left knee    Chondromalacia, left knee    Synovitis of left knee    Anxiety and depression    Acquired hypothyroidism    Moderate persistent asthma without complication    Prediabetes       Past Medical History:   Diagnosis Date    Anxiety     Asthma     controlled     Chávez's esophagus     Depression     GERD (gastroesophageal reflux disease)     H/O cardiovascular stress test 04/01/2022    Exercise Treadmill Stress    Sleep apnea     uses cpap    Thyroid disease     newly dx hypothyroid  (no meds ordered yet)       Past Surgical History:   Procedure Laterality Date    ANKLE SURGERY Left     APPENDECTOMY  1986    BREAST SURGERY      bx x2 left neg    CHOLECYSTECTOMY  1993    COLONOSCOPY      ENDOSCOPY, COLON, DIAGNOSTIC      HAND SURGERY Left     HYSTERECTOMY (CERVIX STATUS UNKNOWN)      KNEE ARTHROSCOPY Left 06/11/2020    Arthroscopic Exam and Treatment of left knee    KNEE ARTHROSCOPY Left 6/11/2020    ARTHROSCOPIC EXAM AND TREATMENT LEFT KNEE, MEDIAL MENISECTOMY, CHONDROPASTY AND SYNOVECTOMY performed by Valene Osgood, DO at [address] [place] [address] SHOULDER SURGERY Right     TOTAL KNEE ARTHROPLASTY Right 7/19/2022    TOTAL RIGHT KNEE REPLACEMENT  ARTHROPLASTY (MARJORIE) performed by Leigh Perez DO at [address]       Allergies   Allergen Reactions    Sulfa Antibiotics Itching    Vancomycin Itching    Penicillins Rash       Social History     Socioeconomic History    Marital status:      Spouse name: None    Number of children: 2    Years of education: None    Highest education level: None   Occupational History     Employer: [email or web address]    Tobacco Use    Smoking status: Never    Smokeless tobacco: Never   Vaping Use    Vaping Use: Never used   Substance and Sexual Activity    Alcohol use: Yes     Comment: occas    Drug use: No     Social Determinants of Health     Financial Resource Strain: Low Risk     Difficulty of Paying Living Expenses: Not hard at all   Food Insecurity: No Food Insecurity    Worried About Running Out of Food in the Last Year: Never true    [address] in the Last Year: Never true       Review of Systems  As follows except as previously noted in HPI:  Constitutional: Negative for chills, diaphoresis, fatigue, fever and unexpected weight change. Respiratory: Negative for cough, shortness of breath and wheezing. Cardiovascular: Negative for chest pain and palpitations. Neurological: Negative for dizziness, syncope, cephalgia. GI / : negative  Musculoskeletal: see HPI       Objective:   Physical Exam   Constitutional: Oriented to person, place, and time. and appears well-developed and well-nourished. :   Head: Normocephalic and atraumatic. Eyes: EOM are normal.   Neck: Neck supple. Cardiovascular: Normal rate and regular rhythm. Pulmonary/Chest: Effort normal. No stridor. No respiratory distress, no wheezes. Abdominal:  No abnormal distension. Neurological: Alert and oriented to person, place, and time. Skin: Skin is warm and dry. Psychiatric: Normal mood and affect.  Behavior is normal. Thought content normal.    EMANUEL Cervantes, DO    11/21/22  9:11 AM

## 2023-01-19 DIAGNOSIS — Z96.651 S/P TOTAL KNEE ARTHROPLASTY, RIGHT: Primary | ICD-10-CM

## 2023-01-23 ENCOUNTER — OFFICE VISIT (OUTPATIENT)
Dept: ORTHOPEDIC SURGERY | Age: 58
End: 2023-01-23
Payer: COMMERCIAL

## 2023-01-23 VITALS — WEIGHT: 223 LBS | TEMPERATURE: 98 F | HEIGHT: 66 IN | BODY MASS INDEX: 35.84 KG/M2

## 2023-01-23 DIAGNOSIS — Z96.651 S/P TOTAL KNEE ARTHROPLASTY, RIGHT: Primary | ICD-10-CM

## 2023-01-23 PROCEDURE — 1036F TOBACCO NON-USER: CPT | Performed by: ORTHOPAEDIC SURGERY

## 2023-01-23 PROCEDURE — 3017F COLORECTAL CA SCREEN DOC REV: CPT | Performed by: ORTHOPAEDIC SURGERY

## 2023-01-23 PROCEDURE — G8417 CALC BMI ABV UP PARAM F/U: HCPCS | Performed by: ORTHOPAEDIC SURGERY

## 2023-01-23 PROCEDURE — G8427 DOCREV CUR MEDS BY ELIG CLIN: HCPCS | Performed by: ORTHOPAEDIC SURGERY

## 2023-01-23 PROCEDURE — 99213 OFFICE O/P EST LOW 20 MIN: CPT | Performed by: ORTHOPAEDIC SURGERY

## 2023-01-23 PROCEDURE — G8484 FLU IMMUNIZE NO ADMIN: HCPCS | Performed by: ORTHOPAEDIC SURGERY

## 2023-01-23 NOTE — PROGRESS NOTES
Chief Complaint:   Chief Complaint   Patient presents with    Knee Pain     Right TKA DOS 07/19/2022, doing good, lost 10lbs       Addie Wing follows up about 6 months postop right total knee replacement arthroplasty. She is doing well with the knee. She does have questions and concerns considering her relatively younger age with arthroplasty and follow-up and longevity of the implants. We discussed this and I encouraged her to follow-up at 5-year intervals at least or seek sooner evaluation if she has concerns. Allergies; medications; past medical, surgical, family, and social history; and problem list have been reviewed today and updated as indicated in this encounter seen below. Exam: Incisions well-healed. Range of motion of the knee is near 0 210 degrees. She has stable collateral ligaments. She has grossly stable anterior and posterior dynamic stability. Patellofemoral alignment is good. Calf is supple without tenderness. Radiographs: Imaging of the right knee and 2 views shows a stable cemented total knee replacement arthroplasty in good alignment    ASSESSMENT:    Bobo Nguyen was seen today for knee pain. Diagnoses and all orders for this visit:    S/P total knee arthroplasty, right        PLAN: Continue with activities as tolerated and exercise to maintain stability in the joint. We will follow-up in 6 months and x-ray the knee again. Return in about 6 months (around 7/23/2023) for R knee X.        Current Outpatient Medications   Medication Sig Dispense Refill    levothyroxine (SYNTHROID) 50 MCG tablet TAKE 1 TABLET BY MOUTH DAILY 90 tablet 1    NONFORMULARY D Pam - otc to prevent UTIs      albuterol sulfate  (90 Base) MCG/ACT inhaler Inhale 2 puffs into the lungs 4 times daily as needed for Wheezing 3 each 1    pantoprazole (PROTONIX) 20 MG tablet Take 20 mg by mouth daily      lactulose (CHRONULAC) 10 GM/15ML solution Take 10 g by mouth daily       VITAMIN D PO Take 15,000 mg by mouth daily      diazePAM (VALIUM) 5 MG tablet Take 5 mg by mouth nightly as needed for Anxiety. PREBIOTIC PRODUCT PO Take by mouth daily       Probiotic Product (PROBIOTIC DAILY PO) Take by mouth daily       traZODone (DESYREL) 100 MG tablet Take 100 mg by mouth nightly       venlafaxine (EFFEXOR XR) 150 MG extended release capsule Take 150 mg by mouth nightly       budesonide-formoterol (SYMBICORT) 160-4.5 MCG/ACT AERO Inhale 2 puffs into the lungs 2 times daily 30.6 g 3    EPINEPHrine (EPIPEN 2-YURIY) 0.3 MG/0.3ML SOAJ injection Inject 0.3 mLs into the muscle once for 1 dose Use as directed for allergic reaction 0.3 mL 2     No current facility-administered medications for this visit.        Patient Active Problem List   Diagnosis    Vitamin D deficiency    Dyslipidemia    Mild intermittent asthma without complication    Bee allergy status    Chronic pain syndrome    Lumbar facet arthropathy    Lumbar disc disorder    JOSÉ MIGUEL (obstructive sleep apnea)    Esophagitis    Derangement of posterior horn of medial meniscus due to old tear or injury, left knee    Chondromalacia, left knee    Synovitis of left knee    Anxiety and depression    Acquired hypothyroidism    Moderate persistent asthma without complication    Prediabetes       Past Medical History:   Diagnosis Date    Anxiety     Asthma     controlled     Chávez's esophagus     Depression     GERD (gastroesophageal reflux disease)     H/O cardiovascular stress test 04/01/2022    Exercise Treadmill Stress    Sleep apnea     uses cpap    Thyroid disease     newly dx hypothyroid  (no meds ordered yet)       Past Surgical History:   Procedure Laterality Date    ANKLE SURGERY Left     APPENDECTOMY  1986    BREAST SURGERY      bx x2 left neg    CHOLECYSTECTOMY  1993    COLONOSCOPY      ENDOSCOPY, COLON, DIAGNOSTIC      HAND SURGERY Left     HYSTERECTOMY (CERVIX STATUS UNKNOWN)      KNEE ARTHROSCOPY Left 06/11/2020    Arthroscopic Exam and Treatment of left knee    KNEE ARTHROSCOPY Left 6/11/2020    ARTHROSCOPIC EXAM AND TREATMENT LEFT KNEE, MEDIAL MENISECTOMY, CHONDROPASTY AND SYNOVECTOMY performed by aP Narayanan DO at [address] Right     TOTAL KNEE ARTHROPLASTY Right 7/19/2022    TOTAL RIGHT KNEE REPLACEMENT  ARTHROPLASTY (MARJORIE) performed by Pa Narayanan DO at [address]       Allergies   Allergen Reactions    Sulfa Antibiotics Itching    Vancomycin Itching    Penicillins Rash       Social History     Socioeconomic History    Marital status:      Spouse name: None    Number of children: 2    Years of education: None    Highest education level: None   Occupational History     Employer: [employer]   Tobacco Use    Smoking status: Never    Smokeless tobacco: Never   Vaping Use    Vaping Use: Never used   Substance and Sexual Activity    Alcohol use: Yes     Comment: occas    Drug use: No     Social Determinants of Health     Financial Resource Strain: Low Risk     Difficulty of Paying Living Expenses: Not hard at all   Food Insecurity: No Food Insecurity    Worried About Running Out of Food in the Last Year: Never true    [address] in the Last Year: Never true       Review of Systems  As follows except as previously noted in HPI:  Constitutional: Negative for chills, diaphoresis, fatigue, fever and unexpected weight change. Respiratory: Negative for cough, shortness of breath and wheezing. Cardiovascular: Negative for chest pain and palpitations. Neurological: Negative for dizziness, syncope, cephalgia. GI / : negative  Musculoskeletal: see HPI       Objective:   Physical Exam   Constitutional: Oriented to person, place, and time. and appears well-developed and well-nourished. :   Head: Normocephalic and atraumatic. Eyes: EOM are normal.   Neck: Neck supple. Cardiovascular: Normal rate and regular rhythm. Pulmonary/Chest: Effort normal. No stridor. No respiratory distress, no wheezes.   Abdominal:  No abnormal distension.    Neurological: Alert and oriented to person, place, and time.   Skin: Skin is warm and dry.   Psychiatric: Normal mood and affect. Behavior is normal. Thought content normal.    EMANUEL Vidal DO    1/23/23  12:20 PM

## 2023-03-20 ENCOUNTER — OFFICE VISIT (OUTPATIENT)
Dept: ORTHOPEDIC SURGERY | Age: 58
End: 2023-03-20
Payer: COMMERCIAL

## 2023-03-20 VITALS — TEMPERATURE: 98 F | WEIGHT: 223 LBS | HEIGHT: 66 IN | BODY MASS INDEX: 35.84 KG/M2

## 2023-03-20 DIAGNOSIS — Z96.651 S/P TOTAL KNEE ARTHROPLASTY, RIGHT: Primary | ICD-10-CM

## 2023-03-20 DIAGNOSIS — M65.9 SYNOVITIS OF LEFT KNEE: ICD-10-CM

## 2023-03-20 DIAGNOSIS — M94.262 CHONDROMALACIA, LEFT KNEE: ICD-10-CM

## 2023-03-20 PROCEDURE — 3017F COLORECTAL CA SCREEN DOC REV: CPT | Performed by: ORTHOPAEDIC SURGERY

## 2023-03-20 PROCEDURE — G8427 DOCREV CUR MEDS BY ELIG CLIN: HCPCS | Performed by: ORTHOPAEDIC SURGERY

## 2023-03-20 PROCEDURE — G8484 FLU IMMUNIZE NO ADMIN: HCPCS | Performed by: ORTHOPAEDIC SURGERY

## 2023-03-20 PROCEDURE — G8417 CALC BMI ABV UP PARAM F/U: HCPCS | Performed by: ORTHOPAEDIC SURGERY

## 2023-03-20 PROCEDURE — 1036F TOBACCO NON-USER: CPT | Performed by: ORTHOPAEDIC SURGERY

## 2023-03-20 PROCEDURE — 99213 OFFICE O/P EST LOW 20 MIN: CPT | Performed by: ORTHOPAEDIC SURGERY

## 2023-03-20 PROCEDURE — 20610 DRAIN/INJ JOINT/BURSA W/O US: CPT | Performed by: ORTHOPAEDIC SURGERY

## 2023-03-20 NOTE — PROGRESS NOTES
Chief Complaint:   Chief Complaint   Patient presents with    Knee Pain     Left knee pain follow up. Knee is becoming painful again. Having hard time doing activities. Would like to discuss treatment options to help her with spring time. Juanita Milligan sees us today for her left knee. This has been problematic for getting a little worse as far as discomfort with activity which has been decreased somewhat. She has had the right knee replaced. She has had the left knee scoped in the past by me in about 2020. Her scopic surgical report for the left knee was reviewed showing the pathology involving chondromalacia, medial meniscus tear and synovial hypertrophy. Allergies; medications; past medical, surgical, family, and social history; and problem list have been reviewed today and updated as indicated in this encounter seen below. Exam: Range of motion of the left knee is good. There are some tenderness palpation medial in the knee. There is slight medial gapping and some crepitus with range of motion. Collaterals and cruciate ligaments are grossly stable. Her calf is supple with no tenderness. There is some discomfort with Ferdinand testing as well. Radiographs: Prior imaging with some degenerative changes in the left knee. ASSESSMENT:    Redia Pallas was seen today for knee pain. Diagnoses and all orders for this visit:    S/P total knee arthroplasty, right    Synovitis of left knee  -     Cancel: UT ARTHROCENTESIS ASPIR&/INJ MAJOR JT/BURSA W/O US  -     Cancel: UT INJ, DUROLANE 1 MG  -     UT ARTHROCENTESIS ASPIR&/INJ MAJOR JT/BURSA W/O US    Chondromalacia, left knee  -     Cancel: UT ARTHROCENTESIS ASPIR&/INJ MAJOR JT/BURSA W/O US  -     Cancel: UT INJ, DUROLANE 1 MG  -     UT ARTHROCENTESIS ASPIR&/INJ MAJOR JT/BURSA W/O US    Other orders  -     sodium hyaluronate (DUROLANE) injection PRSY 60 mg      PLAN: We discussed the options.   Redia Pallas would to have an injection and we had

## 2023-05-12 ENCOUNTER — OFFICE VISIT (OUTPATIENT)
Dept: ORTHOPEDIC SURGERY | Age: 58
End: 2023-05-12

## 2023-05-12 VITALS — WEIGHT: 217 LBS | TEMPERATURE: 98 F | BODY MASS INDEX: 34.87 KG/M2 | HEIGHT: 66 IN

## 2023-05-12 DIAGNOSIS — Z96.651 S/P TOTAL KNEE ARTHROPLASTY, RIGHT: Primary | ICD-10-CM

## 2023-05-12 NOTE — PROGRESS NOTES
Chief Complaint:   Chief Complaint   Patient presents with    Knee Pain     Right TKA DOS 07/19/2022, states of pain increasing lately, states of a loud audible pop x 1 week and increased swelling. Has tried PT and Aqua therapy with minimal relief. States of instablity in the right knee, \"feeling of giving out\"       Greg Rivers was seen in follow-up for her right knee. She apparently had a lot of pain in the last several days. She experienced an episode where there was a loud pop in the knee and she had pain from medial and lateral afterwards. She has some swelling and has had continued pain and difficulty with activities as a result. Been in some physical therapy for her back and the right lower extremity and apparently just finished this. She also has a lift in her left shoe which helped her. She is trying to get this removed. She is apparently going to a chiropractor as well. She did have a right total knee replacement done by me in 7/19/2022. Allergies; medications; past medical, surgical, family, and social history; and problem list have been reviewed today and updated as indicated in this encounter seen below. Exam: Examination shows the right lower extremity aligned well. The incision is well-healed. Syliva Foyer shows little limp favoring the right lower extremity. Patient motion is 0 to 120 degrees or more flexion. She has a little bit of medial and lateral soft tissue laxity without instability of the knee. Active movement stabilizes the knee well. She has good patellofemoral alignment. There is some discomfort with mobilization of the patella as opposed to a surprise reaction. Today there is no effusion or increased local temperature or discoloration. Her calf is supple without tenderness. Active range of motion is intact though she is reluctant to move it in such a fashion.     Radiographs: Previous imaging showed a stable cemented total knee replacement arthroplasty in good

## 2023-06-05 DIAGNOSIS — E03.9 ACQUIRED HYPOTHYROIDISM: ICD-10-CM

## 2023-06-06 LAB
T4 FREE SERPL-MCNC: 1.29 NG/DL (ref 0.93–1.7)
TSH SERPL-MCNC: 2.58 UIU/ML (ref 0.27–4.2)

## 2023-07-21 DIAGNOSIS — Z96.651 S/P TOTAL KNEE ARTHROPLASTY, RIGHT: Primary | ICD-10-CM

## 2023-07-24 ENCOUNTER — OFFICE VISIT (OUTPATIENT)
Dept: ORTHOPEDIC SURGERY | Age: 58
End: 2023-07-24
Payer: COMMERCIAL

## 2023-07-24 VITALS — BODY MASS INDEX: 34.07 KG/M2 | WEIGHT: 212 LBS | TEMPERATURE: 98 F | HEIGHT: 66 IN

## 2023-07-24 DIAGNOSIS — M76.31 IT BAND SYNDROME, RIGHT: ICD-10-CM

## 2023-07-24 DIAGNOSIS — M76.31 IT BAND SYNDROME, RIGHT: Primary | ICD-10-CM

## 2023-07-24 DIAGNOSIS — Z96.651 S/P TOTAL KNEE ARTHROPLASTY, RIGHT: Primary | ICD-10-CM

## 2023-07-24 PROCEDURE — G8417 CALC BMI ABV UP PARAM F/U: HCPCS | Performed by: ORTHOPAEDIC SURGERY

## 2023-07-24 PROCEDURE — 3017F COLORECTAL CA SCREEN DOC REV: CPT | Performed by: ORTHOPAEDIC SURGERY

## 2023-07-24 PROCEDURE — 1036F TOBACCO NON-USER: CPT | Performed by: ORTHOPAEDIC SURGERY

## 2023-07-24 PROCEDURE — 99213 OFFICE O/P EST LOW 20 MIN: CPT | Performed by: ORTHOPAEDIC SURGERY

## 2023-07-24 PROCEDURE — G8427 DOCREV CUR MEDS BY ELIG CLIN: HCPCS | Performed by: ORTHOPAEDIC SURGERY

## 2023-07-24 NOTE — PROGRESS NOTES
Chief Complaint:   Chief Complaint   Patient presents with    Knee Pain     Right TKA DOS 07/19/2022       Annalisa Hendrickson follows up year postop right total knee replacement arthroplasty. She is good range of motion the knee. She is got herself up to walking about a mile at a time now. She still does some limping on occasion. She is got rid of her shoe lift. She sees a different chiropractor who has helped her with that. Continues to have some anterolateral discomfort in her right knee. She also has discomfort around her greater trochanter which radiates down her lateral thigh. Allergies; medications; past medical, surgical, family, and social history; and problem list have been reviewed today and updated as indicated in this encounter seen below. Exam: The wounds well healed. Patient motion of the right knee is 0 to more than 120 degrees of flexion. There is reasonable medial and lateral stability and good anterior and posterior dynamic stability. Patellofemoral alignment is good. He does have some crepitus anterolateral with range of motion of the knee. This seems to be scar tissue related and the tissue does not seem to be overly tight. There is some discomfort in this area as well. She has tenderness to palpation over the greater trochanter. Hip range of motion stability are good. Radiographs: Imaging of the right knee shows a stable cemented total knee replacement arthroplasty in good alignment. ASSESSMENT:    Shalom Melvin was seen today for knee pain. Diagnoses and all orders for this visit:    S/P total knee arthroplasty, right    It band syndrome, right        PLAN: With regards to the continued lateral hip and knee pain, we will have her see physical medicine and rehab doctors for further evaluation and suggestions. Return if symptoms worsen or fail to improve.        Current Outpatient Medications   Medication Sig Dispense Refill    venlafaxine (EFFEXOR XR) 75 MG extended

## 2023-08-09 ENCOUNTER — OFFICE VISIT (OUTPATIENT)
Dept: PHYSICAL MEDICINE AND REHAB | Age: 58
End: 2023-08-09
Payer: COMMERCIAL

## 2023-08-09 VITALS
WEIGHT: 212 LBS | HEART RATE: 70 BPM | BODY MASS INDEX: 34.07 KG/M2 | HEIGHT: 66 IN | SYSTOLIC BLOOD PRESSURE: 109 MMHG | DIASTOLIC BLOOD PRESSURE: 71 MMHG

## 2023-08-09 DIAGNOSIS — Z96.651 STATUS POST TOTAL RIGHT KNEE REPLACEMENT: ICD-10-CM

## 2023-08-09 DIAGNOSIS — M76.31 ILIOTIBIAL BAND SYNDROME OF RIGHT SIDE: ICD-10-CM

## 2023-08-09 DIAGNOSIS — M70.61 GREATER TROCHANTERIC BURSITIS OF RIGHT HIP: Primary | ICD-10-CM

## 2023-08-09 DIAGNOSIS — M21.70 LEG LENGTH DISCREPANCY: ICD-10-CM

## 2023-08-09 PROCEDURE — G8417 CALC BMI ABV UP PARAM F/U: HCPCS | Performed by: PHYSICAL MEDICINE & REHABILITATION

## 2023-08-09 PROCEDURE — 1036F TOBACCO NON-USER: CPT | Performed by: PHYSICAL MEDICINE & REHABILITATION

## 2023-08-09 PROCEDURE — G8427 DOCREV CUR MEDS BY ELIG CLIN: HCPCS | Performed by: PHYSICAL MEDICINE & REHABILITATION

## 2023-08-09 PROCEDURE — 99204 OFFICE O/P NEW MOD 45 MIN: CPT | Performed by: PHYSICAL MEDICINE & REHABILITATION

## 2023-08-09 PROCEDURE — 3017F COLORECTAL CA SCREEN DOC REV: CPT | Performed by: PHYSICAL MEDICINE & REHABILITATION

## 2023-08-09 NOTE — PROGRESS NOTES
Special/provocative testing:   SLR negative   There was a leg length discrepancy. Neurological Exam:  Strength:   R  L  Hip Flex  5  5  Knee Ext  5  5  Ankle dorsi  5  5  EHL   5 5  Ankle Plantar  5  5    Sensory:  Intact for light touch in all lower extremity dermatomes. Reflexes:   R  L  Patellar  (2+) (2+)  Ankle Jerk  (2+) (2+)    Gait is antalgic       Imaging: (personally reviewed by me 08/09/23)  Right knee x-ray   Lumbar x-ray     Impression:   Tanya Guerrero is a 65 y.o. female     1. Greater trochanteric bursitis of right hip    2. Iliotibial band syndrome of right side    3. Status post total right knee replacement    4. Leg length discrepancy        Plan:   Imaging reviewed and interpreted   Will refer her to PT for modalities including US, dry needling, cupping. She needs the heel lift on the left side. Recommend she wear this. Could try steroid injection if needed. The patient was educated about the diagnosis, prognosis, indications, risks and benefits of treatment. An opportunity to ask questions was given to the patient and questions were answered. The patient agreed to proceed with the recommended treatment as described above. Follow up after PT      Thank you for the consultation and for allowing me to participate in the care of this patient.         Sincerely,     Sima Rogers DO, [facility]   Board Certified Physical Medicine and Rehabilitation

## 2023-09-13 ENCOUNTER — TELEPHONE (OUTPATIENT)
Dept: PHYSICAL MEDICINE AND REHAB | Age: 58
End: 2023-09-13

## 2023-09-13 ENCOUNTER — APPOINTMENT (OUTPATIENT)
Dept: MRI IMAGING | Age: 58
End: 2023-09-13
Payer: COMMERCIAL

## 2023-09-13 ENCOUNTER — HOSPITAL ENCOUNTER (EMERGENCY)
Age: 58
Discharge: HOME | End: 2023-09-13
Attending: EMERGENCY MEDICINE
Payer: COMMERCIAL

## 2023-09-13 VITALS
SYSTOLIC BLOOD PRESSURE: 135 MMHG | TEMPERATURE: 98 F | BODY MASS INDEX: 32.77 KG/M2 | OXYGEN SATURATION: 96 % | HEART RATE: 95 BPM | WEIGHT: 203 LBS | RESPIRATION RATE: 20 BRPM | DIASTOLIC BLOOD PRESSURE: 78 MMHG

## 2023-09-13 DIAGNOSIS — M54.50 ACUTE BILATERAL LOW BACK PAIN WITHOUT SCIATICA: Primary | ICD-10-CM

## 2023-09-13 LAB
ANION GAP SERPL CALCULATED.3IONS-SCNC: 14 MMOL/L (ref 7–16)
BASOPHILS # BLD: 0.03 K/UL (ref 0–0.2)
BASOPHILS NFR BLD: 0 % (ref 0–2)
BILIRUB UR QL STRIP: NEGATIVE
BUN SERPL-MCNC: 16 MG/DL (ref 6–20)
CALCIUM SERPL-MCNC: 9.5 MG/DL (ref 8.6–10.2)
CHLORIDE SERPL-SCNC: 103 MMOL/L (ref 98–107)
CLARITY UR: CLEAR
CO2 SERPL-SCNC: 24 MMOL/L (ref 22–29)
COLOR UR: YELLOW
CREAT SERPL-MCNC: 0.8 MG/DL (ref 0.5–1)
EOSINOPHIL # BLD: 0.11 K/UL (ref 0.05–0.5)
EOSINOPHILS RELATIVE PERCENT: 2 % (ref 0–6)
EPI CELLS #/AREA URNS HPF: NORMAL /HPF
ERYTHROCYTE [DISTWIDTH] IN BLOOD BY AUTOMATED COUNT: 13.2 % (ref 11.5–15)
GFR SERPL CREATININE-BSD FRML MDRD: >60 ML/MIN/1.73M2
GLUCOSE SERPL-MCNC: 92 MG/DL (ref 74–99)
GLUCOSE UR STRIP-MCNC: NEGATIVE MG/DL
HCT VFR BLD AUTO: 42.6 % (ref 34–48)
HGB BLD-MCNC: 14.4 G/DL (ref 11.5–15.5)
HGB UR QL STRIP.AUTO: NEGATIVE
IMM GRANULOCYTES # BLD AUTO: <0.03 K/UL (ref 0–0.58)
IMM GRANULOCYTES NFR BLD: 0 % (ref 0–5)
KETONES UR STRIP-MCNC: NEGATIVE MG/DL
LEUKOCYTE ESTERASE UR QL STRIP: NEGATIVE
LYMPHOCYTES NFR BLD: 2.68 K/UL (ref 1.5–4)
LYMPHOCYTES RELATIVE PERCENT: 39 % (ref 20–42)
MCH RBC QN AUTO: 29.6 PG (ref 26–35)
MCHC RBC AUTO-ENTMCNC: 33.8 G/DL (ref 32–34.5)
MCV RBC AUTO: 87.5 FL (ref 80–99.9)
MONOCYTES NFR BLD: 0.42 K/UL (ref 0.1–0.95)
MONOCYTES NFR BLD: 6 % (ref 2–12)
NEUTROPHILS NFR BLD: 53 % (ref 43–80)
NEUTS SEG NFR BLD: 3.62 K/UL (ref 1.8–7.3)
NITRITE UR QL STRIP: NEGATIVE
PH UR STRIP: 6 [PH] (ref 5–9)
PLATELET # BLD AUTO: 232 K/UL (ref 130–450)
PMV BLD AUTO: 11 FL (ref 7–12)
POTASSIUM SERPL-SCNC: 4.4 MMOL/L (ref 3.5–5)
PROT UR STRIP-MCNC: NEGATIVE MG/DL
RBC # BLD AUTO: 4.87 M/UL (ref 3.5–5.5)
RBC #/AREA URNS HPF: NORMAL /HPF
SODIUM SERPL-SCNC: 141 MMOL/L (ref 132–146)
SP GR UR STRIP: 1.01 (ref 1–1.03)
UROBILINOGEN UR STRIP-ACNC: 0.2 EU/DL (ref 0–1)
WBC #/AREA URNS HPF: NORMAL /HPF
WBC OTHER # BLD: 6.9 K/UL (ref 4.5–11.5)

## 2023-09-13 PROCEDURE — 85025 COMPLETE CBC W/AUTO DIFF WBC: CPT

## 2023-09-13 PROCEDURE — 80048 BASIC METABOLIC PNL TOTAL CA: CPT

## 2023-09-13 PROCEDURE — 81001 URINALYSIS AUTO W/SCOPE: CPT

## 2023-09-13 PROCEDURE — 99284 EMERGENCY DEPT VISIT MOD MDM: CPT

## 2023-09-13 PROCEDURE — 72148 MRI LUMBAR SPINE W/O DYE: CPT

## 2023-09-13 NOTE — TELEPHONE ENCOUNTER
Called and spoke with patient and instructed per Dr. Ron Screen  If she truly lost control of her bowels, she should go to the ED for urgent MRI to rule out cauda equina. Then, if this is present, she would need surgery immediately. Also, if the [facility] right side\" is involved, this is not related to her IT band syndrome. I would need to see in office for follow up to examine. Patient states \" I never lost control of my bowels but I will schedule a follow up. \" Scheduled as requested.

## 2023-09-13 NOTE — TELEPHONE ENCOUNTER
If she truly lost control of her bowels, she should go to the ED for urgent MRI to rule out cauda equina. Then, if this is present, she would need surgery immediately. Also, if the [facility] right side\" is involved, this is not related to her IT band syndrome. I would need to see in office for follow up to examine.

## 2023-09-13 NOTE — TELEPHONE ENCOUNTER
Patient called in stating she is doing therapy at [facility] and was wearing a left in her shoe as suggested. \" I had a severe reaction. Pain on my whole right side, made me feel like I didn't have control of my bowels. \" Patient states [facility] suggested she see a Dr. Librado Davis at [facility] but she needs a MRI ordered and requesting one ordered from you. Please advise.

## 2023-09-22 ENCOUNTER — HOSPITAL ENCOUNTER (OUTPATIENT)
Dept: GENERAL RADIOLOGY | Age: 58
Discharge: HOME | End: 2023-09-22
Payer: COMMERCIAL

## 2023-09-22 VITALS — BODY MASS INDEX: 32.62 KG/M2 | WEIGHT: 203 LBS | HEIGHT: 66 IN

## 2023-09-22 DIAGNOSIS — Z12.31 VISIT FOR SCREENING MAMMOGRAM: ICD-10-CM

## 2023-09-22 PROCEDURE — 77063 BREAST TOMOSYNTHESIS BI: CPT

## 2023-10-11 PROBLEM — E78.5 DYSLIPIDEMIA: Chronic | Status: ACTIVE | Noted: 2017-12-05

## 2023-10-11 PROBLEM — E03.9 ACQUIRED HYPOTHYROIDISM: Chronic | Status: ACTIVE | Noted: 2022-10-20

## 2023-10-11 PROBLEM — F41.9 ANXIETY AND DEPRESSION: Chronic | Status: ACTIVE | Noted: 2021-04-07

## 2023-10-11 PROBLEM — E55.9 VITAMIN D DEFICIENCY: Chronic | Status: ACTIVE | Noted: 2017-12-05

## 2023-10-11 PROBLEM — F32.A ANXIETY AND DEPRESSION: Chronic | Status: ACTIVE | Noted: 2021-04-07

## 2023-10-11 PROBLEM — J45.20 MILD INTERMITTENT ASTHMA WITHOUT COMPLICATION: Status: RESOLVED | Noted: 2018-01-22 | Resolved: 2023-10-11

## 2023-10-11 PROBLEM — J45.40 MODERATE PERSISTENT ASTHMA WITHOUT COMPLICATION: Chronic | Status: ACTIVE | Noted: 2022-10-20

## 2023-10-11 PROBLEM — R73.03 PREDIABETES: Chronic | Status: ACTIVE | Noted: 2022-10-20

## 2023-10-16 PROBLEM — R06.81 APNEA: Status: ACTIVE | Noted: 2023-10-16

## 2023-10-16 PROBLEM — J45.909 ASTHMA (HHS-HCC): Status: ACTIVE | Noted: 2023-10-16

## 2023-10-16 PROBLEM — F32.A DEPRESSION: Status: ACTIVE | Noted: 2023-10-16

## 2023-10-16 PROBLEM — F41.9 ANXIETY: Status: ACTIVE | Noted: 2023-10-16

## 2023-10-16 PROBLEM — K21.9 GERD (GASTROESOPHAGEAL REFLUX DISEASE): Status: ACTIVE | Noted: 2023-10-16

## 2023-10-16 PROBLEM — E03.9 HYPOTHYROIDISM: Status: ACTIVE | Noted: 2023-10-16

## 2023-10-18 ENCOUNTER — OFFICE VISIT (OUTPATIENT)
Dept: ORTHOPEDIC SURGERY | Facility: CLINIC | Age: 58
End: 2023-10-18
Payer: COMMERCIAL

## 2023-10-18 DIAGNOSIS — M79.604 PAIN OF RIGHT LOWER EXTREMITY: Primary | ICD-10-CM

## 2023-10-18 PROCEDURE — 1036F TOBACCO NON-USER: CPT | Performed by: ORTHOPAEDIC SURGERY

## 2023-10-18 PROCEDURE — 99204 OFFICE O/P NEW MOD 45 MIN: CPT | Performed by: ORTHOPAEDIC SURGERY

## 2023-10-18 ASSESSMENT — PAIN SCALES - GENERAL: PAINLEVEL_OUTOF10: 5 - MODERATE PAIN

## 2023-10-18 ASSESSMENT — PAIN - FUNCTIONAL ASSESSMENT: PAIN_FUNCTIONAL_ASSESSMENT: 0-10

## 2023-10-18 NOTE — PROGRESS NOTES
This 58-year-old woman is self-referred.    She has a variety of symptoms.  She relates her symptoms to a right total knee replacement performed in July 2022.  She believes she has a leg length inequality.  She has pain in the right knee.  She did not improve following the right total knee replacement.  She has symptoms related to her right groin.  She also has symptoms of pain and numbness radiating up her right flank into her right arm and her neck.    She has been through extensive physical therapy.    No constitutional symptoms.    She has an arthritic left knee and she is contemplating a left total knee replacement.    Family, social, and medical histories are obtained and reviewed.    30-point, patient-recorded Review of Systems is personally obtained and reviewed. Inclusive is no history of weight loss, change in appetite, recent change in activity level, change in bowel or bladder habits, fevers, chills, malaise, or night pain.    Middle-age woman no acute distress.  She has a very abnormal gait.  It appears to be an antalgic gait secondary to right leg pain.    I cannot identify obvious weakness in her upper or lower extremities.  No hyperreflexia.    She has painless motion of both hips.    I can not detect an obvious leg length inequality.    She has had MRIs of her cervical thoracic and lumbar spines.  I do not see any significant stenosis.  There is no spinal cord compression nor any significant cauda equina compression.    Impression: She has multiple mechanical type symptoms of pain and some neurologic symptoms.  However fortunately, there is no significant neural compression.  As such, I would recommend a nonsurgical approach with regard to her spine.  I had a very lengthy discussion with her and her , greater than 30 minutes.  I would recommend a comprehensive nonoperative approach to include therapy and gait training and conditioning.  If her neurologic symptoms persist, then I would  recommend a comprehensive neurology evaluation.    If her symptoms change in any severity further evaluation would be indicated.    As she has a nonsurgical problem, I will plan to see her back on an as-needed basis only.    ** Dictated with voice recognition software and not immediately reviewed for errors in grammar and/or spelling **

## 2023-10-19 DIAGNOSIS — M25.551 RIGHT HIP PAIN: Primary | ICD-10-CM

## 2023-10-23 ENCOUNTER — OFFICE VISIT (OUTPATIENT)
Dept: ORTHOPEDIC SURGERY | Age: 58
End: 2023-10-23
Payer: COMMERCIAL

## 2023-10-23 VITALS — TEMPERATURE: 98 F | WEIGHT: 205 LBS | HEIGHT: 66 IN | BODY MASS INDEX: 32.95 KG/M2

## 2023-10-23 DIAGNOSIS — M76.31 IT BAND SYNDROME, RIGHT: Primary | ICD-10-CM

## 2023-10-23 PROCEDURE — 1036F TOBACCO NON-USER: CPT | Performed by: ORTHOPAEDIC SURGERY

## 2023-10-23 PROCEDURE — G8427 DOCREV CUR MEDS BY ELIG CLIN: HCPCS | Performed by: ORTHOPAEDIC SURGERY

## 2023-10-23 PROCEDURE — G8484 FLU IMMUNIZE NO ADMIN: HCPCS | Performed by: ORTHOPAEDIC SURGERY

## 2023-10-23 PROCEDURE — G8417 CALC BMI ABV UP PARAM F/U: HCPCS | Performed by: ORTHOPAEDIC SURGERY

## 2023-10-23 PROCEDURE — 99213 OFFICE O/P EST LOW 20 MIN: CPT | Performed by: ORTHOPAEDIC SURGERY

## 2023-10-23 PROCEDURE — 3017F COLORECTAL CA SCREEN DOC REV: CPT | Performed by: ORTHOPAEDIC SURGERY

## 2023-10-23 NOTE — PROGRESS NOTES
Chief Complaint:   Chief Complaint   Patient presents with    Hip Pain     Right hip pain, Still has pain from the right total knee replacement, PT put a lift in and was getting muscle spasms. Manuel Diaz follows with us today regarding persistent pain in the right hip area. Says that since she had the knee replaced she has had a sensation that the right thigh segment is pushing up towards her groin. She can planes of groin pain and also lateral trochanteric area pain. She has seen a multitude of medical practitioners and has been told that she has a leg length discrepancy and should use a heel lift but she is refused that. She has been to physical therapy which helped her IT band syndrome. She tried a lift in the past and says that she had pain and dizziness as well as nausea. She also said that she felt that she was losing control of her bladder and bowel. He has seen Dr. Gi Alfredo for her back and apparently there are no indications for treatment. She has had a lumbar MRI. She has been serially seeing a chiropractor who ordered an MRI of her left knee. This knee has been scoped in the past by me. The report was not readily available. Allergies; medications; past medical, surgical, family, and social history; and problem list have been reviewed today and updated as indicated in this encounter seen below. Exam: Jalen Alvarado is noticeably upset today by her chronic discomfort. Examination Mrs. discomfort in the hip and groin area. Right knee range of motion is good. There is no gross instability. Radiographs: Right hip imaging shows maintenance of joint space and contour. There is slight lateral sharpening of the acetabular margin. Lumbar MRI report noted no central stenosis and minimal foraminal narrowing. MRI of the left knee 10/10/2023 from [facility] imaging showed fluid signal in the medial margin of the medial femoral condyle.   There are some truncation of the medial meniscus

## 2023-10-31 ENCOUNTER — HOSPITAL ENCOUNTER (OUTPATIENT)
Dept: MRI IMAGING | Age: 58
Discharge: HOME | End: 2023-11-02
Payer: COMMERCIAL

## 2023-10-31 DIAGNOSIS — M76.31 IT BAND SYNDROME, RIGHT: ICD-10-CM

## 2023-10-31 PROCEDURE — 73721 MRI JNT OF LWR EXTRE W/O DYE: CPT

## 2023-11-06 ENCOUNTER — OFFICE VISIT (OUTPATIENT)
Dept: ORTHOPEDIC SURGERY | Age: 58
End: 2023-11-06
Payer: COMMERCIAL

## 2023-11-06 VITALS — BODY MASS INDEX: 32.95 KG/M2 | TEMPERATURE: 98 F | HEIGHT: 66 IN | WEIGHT: 205 LBS

## 2023-11-06 DIAGNOSIS — M16.11 ARTHRITIS OF RIGHT HIP: ICD-10-CM

## 2023-11-06 DIAGNOSIS — M85.451: Primary | ICD-10-CM

## 2023-11-06 DIAGNOSIS — S73.191A ACETABULAR LABRUM TEAR, RIGHT, INITIAL ENCOUNTER: ICD-10-CM

## 2023-11-06 PROCEDURE — G8427 DOCREV CUR MEDS BY ELIG CLIN: HCPCS | Performed by: ORTHOPAEDIC SURGERY

## 2023-11-06 PROCEDURE — G8484 FLU IMMUNIZE NO ADMIN: HCPCS | Performed by: ORTHOPAEDIC SURGERY

## 2023-11-06 PROCEDURE — 99213 OFFICE O/P EST LOW 20 MIN: CPT | Performed by: ORTHOPAEDIC SURGERY

## 2023-11-06 PROCEDURE — 3017F COLORECTAL CA SCREEN DOC REV: CPT | Performed by: ORTHOPAEDIC SURGERY

## 2023-11-06 PROCEDURE — G8417 CALC BMI ABV UP PARAM F/U: HCPCS | Performed by: ORTHOPAEDIC SURGERY

## 2023-11-06 PROCEDURE — 1036F TOBACCO NON-USER: CPT | Performed by: ORTHOPAEDIC SURGERY

## 2023-11-06 NOTE — PROGRESS NOTES
Chief Complaint:   Chief Complaint   Patient presents with    Hip Pain     Right hip MRI results has been trying to do PT exercises and has improved some but still having trouble walking. Hipolito Swenson was seen in follow-up for her right hip area pain. Her  was present during the visit and asked many questions. He continues to have the anterior pain going from the lateral aspect of the upper thigh to the medial groin area. She apparently has pain with most movements. She still has questions about leg length. She also mentions that acquaintance had problems with an acetabular labrum and was treated for this and has been pain-free since. The MRI of the right hip was done 10/31/2023 and is available for review. Allergies; medications; past medical, surgical, family, and social history; and problem list have been reviewed today and updated as indicated in this encounter seen below. Exam: Examination of the right hip area shows discomfort with both movements of the hip. There is some discomfort with weightbearing as well as ambulation with favoring the right lower extremity. Radiographs: Previous right hip x-rays were reviewed again. MRI of the right hip was reviewed as well as report. Does appear to be a little irregularity in the weightbearing portion of the joint though there is only slight if any narrowing. There is fluid within the anterior column of the pelvis above the acetabulum. Labral signal abnormality was reported by radiologist as was medius medius and minimus tendinopathy. Etiologies did not report the fluid signal within the anterior column pelvis. ASSESSMENT:    Sulma Narayanan was seen today for hip pain. Diagnoses and all orders for this visit:    Solitary bone cyst of right pelvis    Arthritis of right hip    Acetabular labrum tear, right, initial encounter        PLAN: I discussed the findings at length with  and Mrs. Feliciana Barthel.   I do not think leg length is

## 2023-11-15 ENCOUNTER — OFFICE VISIT (OUTPATIENT)
Dept: ORTHOPEDIC SURGERY | Age: 58
End: 2023-11-15

## 2023-11-15 DIAGNOSIS — M65.9 SYNOVITIS OF LEFT KNEE: ICD-10-CM

## 2023-11-15 DIAGNOSIS — M94.262 CHONDROMALACIA, LEFT KNEE: Primary | ICD-10-CM

## 2023-11-15 NOTE — PROGRESS NOTES
Chief Complaint:   Chief Complaint   Patient presents with    Knee Pain     Left knee synvisc one injection       Ricardo Spear follows up today for viscosupplement injection in her left knee. She is also discussing the findings on her pelvis on the previous imaging studies and also intra-articular pathology. She plans to see the doctor who treated her friend at [facility]. Allergies; medications; past medical, surgical, family, and social history; and problem list have been reviewed today and updated as indicated in this encounter seen below. Exam: The left knee range of motion is somewhat limited. There is mild crepitus in the knee with range of motion. Collateral and cruciate ligaments are grossly stable. The knee is not hyperemic or erythematous. There are no contraindications to injection. Radiographs: None    ASSESSMENT:    Robbi Laguna was seen today for knee pain. Diagnoses and all orders for this visit:    Chondromalacia, left knee  -     OH ARTHROCENTESIS ASPIR&/INJ MAJOR JT/BURSA W/O US  -     Hylan G-F 20 (HYLAN) injection 48 mg    Synovitis of left knee  -     OH ARTHROCENTESIS ASPIR&/INJ MAJOR JT/BURSA W/O US  -     Hylan G-F 20 (HYLAN) injection 48 mg        PLAN: Aspiration and Injectionof the left knee with Synvisc ONE  48mgwas discussed with the patient. Discussion included but was not limited to potential risks and benefits. No contraindications to the procedure were noted. Understanding and agreement was indicated. The procedure was accomplished without incident using appropriate aseptic technique. Recovered was: 2 cc clear and yellow joint fluid which was discarded  Lot number DR   follow up as needed    Return if symptoms worsen or fail to improve.        Current Outpatient Medications   Medication Sig Dispense Refill    levothyroxine (SYNTHROID) 25 MCG tablet TAKE 1 TABLET BY MOUTH DAILY 90 tablet 3    venlafaxine (EFFEXOR XR) 75 MG extended release capsule

## 2023-12-01 ENCOUNTER — OFFICE VISIT (OUTPATIENT)
Dept: ORTHOPEDIC SURGERY | Age: 58
End: 2023-12-01

## 2023-12-01 VITALS — WEIGHT: 205 LBS | HEIGHT: 66 IN | BODY MASS INDEX: 32.95 KG/M2 | TEMPERATURE: 98 F

## 2023-12-01 DIAGNOSIS — Z96.651 S/P TOTAL KNEE ARTHROPLASTY, RIGHT: Primary | ICD-10-CM

## 2023-12-01 NOTE — PROGRESS NOTES
Chief Complaint:   Chief Complaint   Patient presents with    Knee Pain     F/u right TKA DOS: 7/19/22. C/o swelling, warmth to touch, and pain since 11/29/23. Patient states no recent injuries or falls. Kennith Cheadle follows up today regarding her right knee. She had the knee replacement 7/19/2022. She is complaining of some swelling in the knee for the last 2 days. She is concerned about complications with the knee replacement and any relationship to problems with her hip. She is scheduled to see a Dr. [facility] guarding her hip in 4 days. Rodriguez he is walking with a cane in the left hand. Allergies; medications; past medical, surgical, family, and social history; and problem list have been reviewed today and updated as indicated in this encounter seen below. Exam: Skin condition and gross neurovascular functions good in the right lower extremity. Her incision is well-healed with no complications. Alignment of the knee is good. Her calf is supple without tenderness. Active range of motion of the right knee is 0 to more than 120 degrees flexion. She has stable collateral ligaments. She has good anterior and posterior dynamic stability. There is no effusion in the knee this morning. The knee is not hyperemic or erythematous. Radiographs: None    ASSESSMENT:    Stephanie Queen was seen today for knee pain. Diagnoses and all orders for this visit:    S/P total knee arthroplasty, right        PLAN: Symptomatic treatment. There is no indication for any change in care regarding the right knee. Stephanie Quene will defer appointment at [facility] next Tuesday. No follow-ups on file.        Current Outpatient Medications   Medication Sig Dispense Refill    levothyroxine (SYNTHROID) 25 MCG tablet TAKE 1 TABLET BY MOUTH DAILY 90 tablet 3    venlafaxine (EFFEXOR XR) 75 MG extended release capsule       EPINEPHrine (EPIPEN 2-YURIY) 0.3 MG/0.3ML SOAJ injection Inject 0.3 mLs into the muscle

## 2024-01-18 ENCOUNTER — PATIENT MESSAGE (OUTPATIENT)
Dept: FAMILY MEDICINE CLINIC | Age: 59
End: 2024-01-18

## 2024-01-18 RX ORDER — BUDESONIDE AND FORMOTEROL FUMARATE DIHYDRATE 160; 4.5 UG/1; UG/1
2 AEROSOL RESPIRATORY (INHALATION) 2 TIMES DAILY
Qty: 2 EACH | Refills: 2 | Status: SHIPPED | OUTPATIENT
Start: 2024-01-18 | End: 2024-04-17

## 2024-01-18 NOTE — TELEPHONE ENCOUNTER
From: Lauren West  To: Dr. Jass Bui  Sent: 1/18/2024 8:24 AM EST  Subject: Refill    I don't see my symbicort on my medication list and I need a refill please. It's 160/4.5

## 2024-01-26 ENCOUNTER — OFFICE VISIT (OUTPATIENT)
Dept: FAMILY MEDICINE CLINIC | Age: 59
End: 2024-01-26
Payer: COMMERCIAL

## 2024-01-26 VITALS
RESPIRATION RATE: 16 BRPM | HEART RATE: 79 BPM | SYSTOLIC BLOOD PRESSURE: 118 MMHG | WEIGHT: 221 LBS | DIASTOLIC BLOOD PRESSURE: 78 MMHG | TEMPERATURE: 97.8 F | OXYGEN SATURATION: 98 % | HEIGHT: 66 IN | BODY MASS INDEX: 35.52 KG/M2

## 2024-01-26 DIAGNOSIS — J45.40 MODERATE PERSISTENT ASTHMA WITHOUT COMPLICATION: Chronic | ICD-10-CM

## 2024-01-26 DIAGNOSIS — E03.9 ACQUIRED HYPOTHYROIDISM: Chronic | ICD-10-CM

## 2024-01-26 DIAGNOSIS — E78.5 DYSLIPIDEMIA: Chronic | ICD-10-CM

## 2024-01-26 DIAGNOSIS — R73.03 PREDIABETES: Chronic | ICD-10-CM

## 2024-01-26 DIAGNOSIS — Z23 NEED FOR INFLUENZA VACCINATION: ICD-10-CM

## 2024-01-26 DIAGNOSIS — Z01.818 PRE-OP EXAMINATION: Primary | ICD-10-CM

## 2024-01-26 DIAGNOSIS — Z23 NEED FOR PNEUMOCOCCAL VACCINATION: ICD-10-CM

## 2024-01-26 PROBLEM — K20.90 ESOPHAGITIS: Status: RESOLVED | Noted: 2019-04-24 | Resolved: 2024-01-26

## 2024-01-26 PROBLEM — M23.222 DERANGEMENT OF POSTERIOR HORN OF MEDIAL MENISCUS DUE TO OLD TEAR OR INJURY, LEFT KNEE: Status: ACTIVE | Noted: 2020-06-11

## 2024-01-26 PROBLEM — G89.4 CHRONIC PAIN SYNDROME: Status: RESOLVED | Noted: 2018-08-10 | Resolved: 2024-01-26

## 2024-01-26 PROCEDURE — 93000 ELECTROCARDIOGRAM COMPLETE: CPT | Performed by: FAMILY MEDICINE

## 2024-01-26 PROCEDURE — G8484 FLU IMMUNIZE NO ADMIN: HCPCS | Performed by: FAMILY MEDICINE

## 2024-01-26 PROCEDURE — 1036F TOBACCO NON-USER: CPT | Performed by: FAMILY MEDICINE

## 2024-01-26 PROCEDURE — 99213 OFFICE O/P EST LOW 20 MIN: CPT | Performed by: FAMILY MEDICINE

## 2024-01-26 PROCEDURE — G8427 DOCREV CUR MEDS BY ELIG CLIN: HCPCS | Performed by: FAMILY MEDICINE

## 2024-01-26 PROCEDURE — 3017F COLORECTAL CA SCREEN DOC REV: CPT | Performed by: FAMILY MEDICINE

## 2024-01-26 PROCEDURE — G8417 CALC BMI ABV UP PARAM F/U: HCPCS | Performed by: FAMILY MEDICINE

## 2024-01-26 RX ORDER — CELECOXIB 200 MG/1
200 CAPSULE ORAL DAILY
COMMUNITY
Start: 2024-01-08

## 2024-01-26 RX ORDER — LACTULOSE 10 G/15ML
SOLUTION ORAL
COMMUNITY
Start: 2024-01-12

## 2024-01-26 ASSESSMENT — ENCOUNTER SYMPTOMS
SHORTNESS OF BREATH: 0
BACK PAIN: 1
BLOOD IN STOOL: 0
CHEST TIGHTNESS: 0

## 2024-01-26 NOTE — PROGRESS NOTES
Lauren West   Patient is a 58 y.o. year old female who presents with:  Chief Complaint   Patient presents with    Pre-op Exam     Right hip surgery on 2/2/2024 with Dr. Zach Moy      Patient also follows with: Ortho, urology, GYN, psych, GI, PM&R    HPI    Preoperative Evaluation  The patient plans to undergo R LUKE which is tentatively scheduled on 2/2/2024 with Dr. Moy through [facility] in [place]. Labs were done through [facility] yesterday per pt. Relevant medical history includes asthma, hypothyroidism which is/are stable with treatment. Patient denies history of MI, CHF, valvular disease, COPD, DVT/PE, or problems with anesthesia. Patient is able to climb two flights of stairs without stopping to rest. Stress test was done 4/2022. Denies recent chest pain, palpitations, leg swelling, SOB, orthopnea, or URI symptoms. Tobacco use:  reports that she has never smoked. She has never used smokeless tobacco.    Hypothyroidism  Current treatment: levothyroxine 25ug daily  Recent changes in medication: none  Lab Results   Component Value Date    TSH 4.06 10/04/2023    T4FREE 1.1 10/04/2023     Dyslipidemia  Current treatment: omega 3 FA, RYR  Recent changes in medications: none  Indications for statin therapy include: primary elevation of LDL >190.  Declined recommendation for statin therapy  Lab Results   Component Value Date    CHOL 270 (H) 10/04/2023    HDL 61 10/04/2023    LDLCALC 213 (H) 04/14/2023    TRIG 96 10/04/2023   The 10-year ASCVD risk score (Indra ORTIZ, et al., 2019) is: 2.5%    Values used to calculate the score:      Age: 58 years      Sex: Female      Is Non- : No      Diabetic: No      Tobacco smoker: No      Systolic Blood Pressure: 118 mmHg      Is BP treated: No      HDL Cholesterol: 61 mg/dL      Total Cholesterol: 270 mg/dL    Asthma  Current treatment: Symbicort, albuterol as needed   Recent changes in medication: none    Vitamin D deficiency  Current

## 2024-02-07 ENCOUNTER — APPOINTMENT (OUTPATIENT)
Dept: NEUROLOGY | Facility: HOSPITAL | Age: 59
End: 2024-02-07
Payer: COMMERCIAL

## 2024-04-03 ENCOUNTER — APPOINTMENT (OUTPATIENT)
Dept: NEUROLOGY | Facility: HOSPITAL | Age: 59
End: 2024-04-03
Payer: COMMERCIAL

## 2024-04-06 ASSESSMENT — PATIENT HEALTH QUESTIONNAIRE - PHQ9
1. LITTLE INTEREST OR PLEASURE IN DOING THINGS: NOT AT ALL
9. THOUGHTS THAT YOU WOULD BE BETTER OFF DEAD, OR OF HURTING YOURSELF: NOT AT ALL
5. POOR APPETITE OR OVEREATING: NOT AT ALL
5. POOR APPETITE OR OVEREATING: NOT AT ALL
SUM OF ALL RESPONSES TO PHQ QUESTIONS 1-9: 0
1. LITTLE INTEREST OR PLEASURE IN DOING THINGS: NOT AT ALL
9. THOUGHTS THAT YOU WOULD BE BETTER OFF DEAD, OR OF HURTING YOURSELF: NOT AT ALL
SUM OF ALL RESPONSES TO PHQ QUESTIONS 1-9: 0
2. FEELING DOWN, DEPRESSED OR HOPELESS: NOT AT ALL
10. IF YOU CHECKED OFF ANY PROBLEMS, HOW DIFFICULT HAVE THESE PROBLEMS MADE IT FOR YOU TO DO YOUR WORK, TAKE CARE OF THINGS AT HOME, OR GET ALONG WITH OTHER PEOPLE: NOT DIFFICULT AT ALL
SUM OF ALL RESPONSES TO PHQ QUESTIONS 1-9: 0
SUM OF ALL RESPONSES TO PHQ QUESTIONS 1-9: 0
7. TROUBLE CONCENTRATING ON THINGS, SUCH AS READING THE NEWSPAPER OR WATCHING TELEVISION: NOT AT ALL
6. FEELING BAD ABOUT YOURSELF - OR THAT YOU ARE A FAILURE OR HAVE LET YOURSELF OR YOUR FAMILY DOWN: NOT AT ALL
2. FEELING DOWN, DEPRESSED OR HOPELESS: NOT AT ALL
10. IF YOU CHECKED OFF ANY PROBLEMS, HOW DIFFICULT HAVE THESE PROBLEMS MADE IT FOR YOU TO DO YOUR WORK, TAKE CARE OF THINGS AT HOME, OR GET ALONG WITH OTHER PEOPLE: NOT DIFFICULT AT ALL
8. MOVING OR SPEAKING SO SLOWLY THAT OTHER PEOPLE COULD HAVE NOTICED. OR THE OPPOSITE - BEING SO FIDGETY OR RESTLESS THAT YOU HAVE BEEN MOVING AROUND A LOT MORE THAN USUAL: NOT AT ALL
8. MOVING OR SPEAKING SO SLOWLY THAT OTHER PEOPLE COULD HAVE NOTICED. OR THE OPPOSITE, BEING SO FIGETY OR RESTLESS THAT YOU HAVE BEEN MOVING AROUND A LOT MORE THAN USUAL: NOT AT ALL
4. FEELING TIRED OR HAVING LITTLE ENERGY: NOT AT ALL
SUM OF ALL RESPONSES TO PHQ9 QUESTIONS 1 & 2: 0
3. TROUBLE FALLING OR STAYING ASLEEP: NOT AT ALL
3. TROUBLE FALLING OR STAYING ASLEEP: NOT AT ALL
7. TROUBLE CONCENTRATING ON THINGS, SUCH AS READING THE NEWSPAPER OR WATCHING TELEVISION: NOT AT ALL
4. FEELING TIRED OR HAVING LITTLE ENERGY: NOT AT ALL
6. FEELING BAD ABOUT YOURSELF - OR THAT YOU ARE A FAILURE OR HAVE LET YOURSELF OR YOUR FAMILY DOWN: NOT AT ALL
SUM OF ALL RESPONSES TO PHQ QUESTIONS 1-9: 0

## 2024-04-09 ENCOUNTER — OFFICE VISIT (OUTPATIENT)
Dept: FAMILY MEDICINE CLINIC | Age: 59
End: 2024-04-09
Payer: COMMERCIAL

## 2024-04-09 VITALS
DIASTOLIC BLOOD PRESSURE: 68 MMHG | OXYGEN SATURATION: 96 % | SYSTOLIC BLOOD PRESSURE: 124 MMHG | WEIGHT: 223.5 LBS | TEMPERATURE: 98.7 F | RESPIRATION RATE: 16 BRPM | BODY MASS INDEX: 35.92 KG/M2 | HEIGHT: 66 IN | HEART RATE: 84 BPM

## 2024-04-09 DIAGNOSIS — E55.9 VITAMIN D DEFICIENCY: Chronic | ICD-10-CM

## 2024-04-09 DIAGNOSIS — E78.5 DYSLIPIDEMIA: Chronic | ICD-10-CM

## 2024-04-09 DIAGNOSIS — F32.A ANXIETY AND DEPRESSION: Chronic | ICD-10-CM

## 2024-04-09 DIAGNOSIS — R73.03 PREDIABETES: Chronic | ICD-10-CM

## 2024-04-09 DIAGNOSIS — E03.9 ACQUIRED HYPOTHYROIDISM: Primary | Chronic | ICD-10-CM

## 2024-04-09 DIAGNOSIS — F41.9 ANXIETY AND DEPRESSION: Chronic | ICD-10-CM

## 2024-04-09 DIAGNOSIS — J45.40 MODERATE PERSISTENT ASTHMA WITHOUT COMPLICATION: Chronic | ICD-10-CM

## 2024-04-09 PROCEDURE — 3017F COLORECTAL CA SCREEN DOC REV: CPT | Performed by: FAMILY MEDICINE

## 2024-04-09 PROCEDURE — 99214 OFFICE O/P EST MOD 30 MIN: CPT | Performed by: FAMILY MEDICINE

## 2024-04-09 PROCEDURE — G8417 CALC BMI ABV UP PARAM F/U: HCPCS | Performed by: FAMILY MEDICINE

## 2024-04-09 PROCEDURE — G8427 DOCREV CUR MEDS BY ELIG CLIN: HCPCS | Performed by: FAMILY MEDICINE

## 2024-04-09 PROCEDURE — 1036F TOBACCO NON-USER: CPT | Performed by: FAMILY MEDICINE

## 2024-04-09 RX ORDER — BUDESONIDE AND FORMOTEROL FUMARATE DIHYDRATE 160; 4.5 UG/1; UG/1
2 AEROSOL RESPIRATORY (INHALATION) 2 TIMES DAILY
Qty: 2 EACH | Refills: 2 | Status: SHIPPED | OUTPATIENT
Start: 2024-04-09 | End: 2024-07-08

## 2024-04-09 RX ORDER — LEVOTHYROXINE SODIUM 0.05 MG/1
50 TABLET ORAL DAILY
Qty: 90 TABLET | Refills: 1 | Status: SHIPPED | OUTPATIENT
Start: 2024-04-09

## 2024-04-09 ASSESSMENT — ENCOUNTER SYMPTOMS
SHORTNESS OF BREATH: 0
BLOOD IN STOOL: 0
BACK PAIN: 1

## 2024-04-09 NOTE — PROGRESS NOTES
Lauren West   Patient is a 58 y.o. year old female who presents with:  Chief Complaint   Patient presents with    Follow-up    Results     Blood work completed 4/3/2024     Patient also follows with: Ortho, urology, GYN, psych, GI    HPI    Underwent R LUKE 1/2024. Hip doing well but now having problems with the knees which are being addressed by ortho through [facility]. Currently taking celebrex    Body mass index is 36.09 kg/m².      4/9/2024     1:37 PM 1/26/2024    10:58 AM 12/1/2023    10:45 AM 11/6/2023     2:02 PM 10/23/2023     2:08 PM 10/11/2023     9:38 AM 9/22/2023    10:03 AM   Weight Metrics   Weight 223 lb 8 oz 221 lb 205 lb 205 lb 205 lb 205 lb 203 lb   BMI (Calculated) 36.2 kg/m2 35.8 kg/m2 33.2 kg/m2 33.2 kg/m2 33.2 kg/m2 33.2 kg/m2 32.8 kg/m2   Patient has declined bariatric referral  Discussed [employer] but not covered    Hypothyroidism  Current treatment: levothyroxine 25ug daily  Recent changes in medication: none  Decreased dose in the past per pts desire to dc if possible but reports weight loss has been very difficult with the change in dose  Lab Results   Component Value Date    TSH 3.84 04/03/2024    T4FREE 1.2 04/03/2024     Dyslipidemia  Current treatment: omega 3 FA, RYR  Recent changes in medications: restarted RYR  Indications for statin therapy include: primary elevation of LDL >190.  Declined recommendation for statin therapy  Lab Results   Component Value Date    CHOL 276 (H) 04/03/2024    HDL 62 04/03/2024    LDLCALC 213 (H) 04/14/2023    TRIG 125 04/03/2024   The 10-year ASCVD risk score (Indra ORTIZ, et al., 2019) is: 2.8%    Values used to calculate the score:      Age: 58 years      Sex: Female      Is Non- : No      Diabetic: No      Tobacco smoker: No      Systolic Blood Pressure: 124 mmHg      Is BP treated: No      HDL Cholesterol: 62 mg/dL      Total Cholesterol: 276 mg/dL    Asthma  Current treatment: Symbicort (uses seasonally - usually

## 2024-04-10 ENCOUNTER — APPOINTMENT (OUTPATIENT)
Dept: NEUROLOGY | Facility: HOSPITAL | Age: 59
End: 2024-04-10
Payer: COMMERCIAL

## 2024-05-06 ENCOUNTER — TELEPHONE (OUTPATIENT)
Dept: FAMILY MEDICINE CLINIC | Age: 59
End: 2024-05-06

## 2024-05-06 DIAGNOSIS — E03.9 ACQUIRED HYPOTHYROIDISM: Primary | ICD-10-CM

## 2024-05-09 DIAGNOSIS — E03.9 ACQUIRED HYPOTHYROIDISM: ICD-10-CM

## 2024-05-10 LAB
T4 FREE: 1.3 NG/DL (ref 0.9–1.7)
TSH SERPL DL<=0.05 MIU/L-ACNC: 1.81 UIU/ML (ref 0.27–4.2)

## 2024-08-07 ENCOUNTER — OFFICE VISIT (OUTPATIENT)
Dept: FAMILY MEDICINE CLINIC | Age: 59
End: 2024-08-07
Payer: COMMERCIAL

## 2024-08-07 VITALS
HEART RATE: 78 BPM | OXYGEN SATURATION: 98 % | TEMPERATURE: 97.2 F | DIASTOLIC BLOOD PRESSURE: 68 MMHG | SYSTOLIC BLOOD PRESSURE: 118 MMHG | WEIGHT: 223 LBS | BODY MASS INDEX: 35.84 KG/M2 | HEIGHT: 66 IN | RESPIRATION RATE: 16 BRPM

## 2024-08-07 DIAGNOSIS — J45.41 MODERATE PERSISTENT ASTHMA WITH EXACERBATION: Primary | ICD-10-CM

## 2024-08-07 DIAGNOSIS — R05.1 ACUTE COUGH: ICD-10-CM

## 2024-08-07 DIAGNOSIS — R09.81 SINUS CONGESTION: ICD-10-CM

## 2024-08-07 LAB
INFLUENZA A ANTIGEN, POC: NEGATIVE
INFLUENZA B ANTIGEN, POC: NEGATIVE
LOT EXPIRE DATE: NORMAL
LOT KIT NUMBER: NORMAL
SARS-COV-2, POC: NORMAL
VALID INTERNAL CONTROL: NORMAL
VENDOR AND KIT NAME POC: NORMAL

## 2024-08-07 PROCEDURE — 3017F COLORECTAL CA SCREEN DOC REV: CPT | Performed by: FAMILY MEDICINE

## 2024-08-07 PROCEDURE — 1036F TOBACCO NON-USER: CPT | Performed by: FAMILY MEDICINE

## 2024-08-07 PROCEDURE — 87428 SARSCOV & INF VIR A&B AG IA: CPT | Performed by: FAMILY MEDICINE

## 2024-08-07 PROCEDURE — G8417 CALC BMI ABV UP PARAM F/U: HCPCS | Performed by: FAMILY MEDICINE

## 2024-08-07 PROCEDURE — 99214 OFFICE O/P EST MOD 30 MIN: CPT | Performed by: FAMILY MEDICINE

## 2024-08-07 PROCEDURE — G8427 DOCREV CUR MEDS BY ELIG CLIN: HCPCS | Performed by: FAMILY MEDICINE

## 2024-08-07 RX ORDER — METHYLPREDNISOLONE 4 MG/1
TABLET ORAL
Qty: 1 KIT | Refills: 0 | Status: SHIPPED | OUTPATIENT
Start: 2024-08-07 | End: 2024-08-13

## 2024-08-07 RX ORDER — AZITHROMYCIN 250 MG/1
TABLET, FILM COATED ORAL
Qty: 6 TABLET | Refills: 0 | Status: SHIPPED | OUTPATIENT
Start: 2024-08-07 | End: 2024-08-17

## 2024-08-07 RX ORDER — GUAIFENESIN/DEXTROMETHORPHAN 100-10MG/5
5 SYRUP ORAL 3 TIMES DAILY PRN
Qty: 120 ML | Refills: 0 | Status: SHIPPED | OUTPATIENT
Start: 2024-08-07 | End: 2024-08-17

## 2024-08-07 RX ORDER — BENZONATATE 100 MG/1
100 CAPSULE ORAL 3 TIMES DAILY PRN
Qty: 30 CAPSULE | Refills: 0 | Status: SHIPPED | OUTPATIENT
Start: 2024-08-07 | End: 2024-08-17

## 2024-08-07 RX ORDER — ALBUTEROL SULFATE 90 UG/1
2 AEROSOL, METERED RESPIRATORY (INHALATION) 4 TIMES DAILY PRN
Qty: 3 EACH | Refills: 1 | Status: SHIPPED | OUTPATIENT
Start: 2024-08-07

## 2024-08-07 SDOH — ECONOMIC STABILITY: HOUSING INSECURITY
IN THE LAST 12 MONTHS, WAS THERE A TIME WHEN YOU DID NOT HAVE A STEADY PLACE TO SLEEP OR SLEPT IN A SHELTER (INCLUDING NOW)?: NO

## 2024-08-07 SDOH — ECONOMIC STABILITY: FOOD INSECURITY: WITHIN THE PAST 12 MONTHS, YOU WORRIED THAT YOUR FOOD WOULD RUN OUT BEFORE YOU GOT MONEY TO BUY MORE.: NEVER TRUE

## 2024-08-07 SDOH — ECONOMIC STABILITY: INCOME INSECURITY: HOW HARD IS IT FOR YOU TO PAY FOR THE VERY BASICS LIKE FOOD, HOUSING, MEDICAL CARE, AND HEATING?: NOT HARD AT ALL

## 2024-08-07 SDOH — ECONOMIC STABILITY: FOOD INSECURITY: WITHIN THE PAST 12 MONTHS, THE FOOD YOU BOUGHT JUST DIDN'T LAST AND YOU DIDN'T HAVE MONEY TO GET MORE.: NEVER TRUE

## 2024-08-07 ASSESSMENT — ENCOUNTER SYMPTOMS
NAUSEA: 0
DIARRHEA: 0
SINUS PRESSURE: 1
SHORTNESS OF BREATH: 1
SORE THROAT: 0
SINUS PAIN: 0
COUGH: 1
ABDOMINAL PAIN: 0
WHEEZING: 1
VOMITING: 0

## 2024-08-07 NOTE — PROGRESS NOTES
Sinus: Maxillary sinus tenderness (mild) present.      Left Sinus: Maxillary sinus tenderness (mild) present.      Mouth/Throat:      Mouth: Mucous membranes are moist.      Pharynx: Oropharynx is clear. No oropharyngeal exudate or posterior oropharyngeal erythema.   Cardiovascular:      Rate and Rhythm: Normal rate and regular rhythm.   Pulmonary:      Effort: Pulmonary effort is normal. No respiratory distress.      Breath sounds: Wheezing present. No rhonchi or rales.   Lymphadenopathy:      Cervical: No cervical adenopathy.   Skin:     General: Skin is warm and dry.   Neurological:      General: No focal deficit present.      Mental Status: She is alert and oriented to person, place, and time.   Psychiatric:         Mood and Affect: Mood normal.         Behavior: Behavior normal.       ASSESSMENT AND PLAN    1. Moderate persistent asthma with exacerbation  Begin azithromycin, Medrol Dosepak  Begin Robitussin DM as needed for cough and congestion, Tessalon Perles as needed for cough  Continue Symbicort, begin albuterol as needed  Rest, hydration  Discussed expected clinical course and s/s for which to call vs seek emergent medical attention.  - azithromycin (ZITHROMAX) 250 MG tablet; 500mg on day 1 followed by 250mg on days 2 - 5  Dispense: 6 tablet; Refill: 0  - methylPREDNISolone (MEDROL DOSEPACK) 4 MG tablet; Take by mouth.  Dispense: 1 kit; Refill: 0  - guaiFENesin-dextromethorphan (ROBITUSSIN DM) 100-10 MG/5ML syrup; Take 5 mLs by mouth 3 times daily as needed for Cough (and congestion)  Dispense: 120 mL; Refill: 0  - benzonatate (TESSALON) 100 MG capsule; Take 1 capsule by mouth 3 times daily as needed for Cough  Dispense: 30 capsule; Refill: 0  - albuterol sulfate HFA (PROVENTIL;VENTOLIN;PROAIR) 108 (90 Base) MCG/ACT inhaler; Inhale 2 puffs into the lungs 4 times daily as needed for Wheezing or Shortness of Breath  Dispense: 3 each; Refill: 1    2. Acute cough  - POCT COVID-19 & Influenza A/B    3. Sinus

## 2024-08-21 DIAGNOSIS — E03.9 ACQUIRED HYPOTHYROIDISM: Chronic | ICD-10-CM

## 2024-08-21 DIAGNOSIS — J45.41 MODERATE PERSISTENT ASTHMA WITH EXACERBATION: ICD-10-CM

## 2024-08-21 RX ORDER — ALBUTEROL SULFATE 90 UG/1
2 AEROSOL, METERED RESPIRATORY (INHALATION) 4 TIMES DAILY PRN
Qty: 3 EACH | Refills: 1 | Status: SHIPPED | OUTPATIENT
Start: 2024-08-21

## 2024-08-21 RX ORDER — BUDESONIDE AND FORMOTEROL FUMARATE DIHYDRATE 160; 4.5 UG/1; UG/1
2 AEROSOL RESPIRATORY (INHALATION) 2 TIMES DAILY
Qty: 2 EACH | Refills: 2 | Status: SHIPPED | OUTPATIENT
Start: 2024-08-21 | End: 2024-11-19

## 2024-08-21 NOTE — TELEPHONE ENCOUNTER
Requested Prescriptions     Pending Prescriptions Disp Refills    albuterol sulfate HFA (PROVENTIL;VENTOLIN;PROAIR) 108 (90 Base) MCG/ACT inhaler 3 each 1     Sig: Inhale 2 puffs into the lungs 4 times daily as needed for Wheezing or Shortness of Breath    budesonide-formoterol (SYMBICORT) 160-4.5 MCG/ACT AERO 2 each 2     Sig: Inhale 2 puffs into the lungs 2 times daily       Next appt is 9/18/2024  Last appt was 8/7/2024

## 2024-09-05 DIAGNOSIS — J45.40 MODERATE PERSISTENT ASTHMA WITHOUT COMPLICATION: Chronic | ICD-10-CM

## 2024-09-05 RX ORDER — LEVOTHYROXINE SODIUM 50 UG/1
50 TABLET ORAL DAILY
Qty: 90 TABLET | Refills: 0 | Status: SHIPPED | OUTPATIENT
Start: 2024-09-05

## 2024-09-05 NOTE — TELEPHONE ENCOUNTER
Requested Prescriptions     Pending Prescriptions Disp Refills    levothyroxine (SYNTHROID) 50 MCG tablet [Pharmacy Med Name: Levothyroxine Sodium 50 MCG Oral Tablet] 90 tablet 3     Sig: TAKE 1 TABLET BY MOUTH DAILY       Next appt is 9/18/2024  Last appt was 8/7/2024

## 2024-09-11 DIAGNOSIS — J45.40 MODERATE PERSISTENT ASTHMA WITHOUT COMPLICATION: Chronic | ICD-10-CM

## 2024-09-11 DIAGNOSIS — F32.A ANXIETY AND DEPRESSION: Chronic | ICD-10-CM

## 2024-09-11 DIAGNOSIS — E03.9 ACQUIRED HYPOTHYROIDISM: Chronic | ICD-10-CM

## 2024-09-11 DIAGNOSIS — F41.9 ANXIETY AND DEPRESSION: Chronic | ICD-10-CM

## 2024-09-11 DIAGNOSIS — E55.9 VITAMIN D DEFICIENCY: Chronic | ICD-10-CM

## 2024-09-11 DIAGNOSIS — R73.03 PREDIABETES: Chronic | ICD-10-CM

## 2024-09-11 DIAGNOSIS — E78.5 DYSLIPIDEMIA: Chronic | ICD-10-CM

## 2024-09-11 LAB
ALBUMIN: 4.6 G/DL (ref 3.5–5.2)
ALP BLD-CCNC: 71 U/L (ref 35–104)
ALT SERPL-CCNC: 19 U/L (ref 0–32)
ANION GAP SERPL CALCULATED.3IONS-SCNC: 12 MMOL/L (ref 7–16)
AST SERPL-CCNC: 21 U/L (ref 0–31)
BASOPHILS ABSOLUTE: 0.02 K/UL (ref 0–0.2)
BASOPHILS RELATIVE PERCENT: 0 % (ref 0–2)
BILIRUB SERPL-MCNC: 0.3 MG/DL (ref 0–1.2)
BUN BLDV-MCNC: 18 MG/DL (ref 6–20)
CALCIUM SERPL-MCNC: 9.1 MG/DL (ref 8.6–10.2)
CHLORIDE BLD-SCNC: 106 MMOL/L (ref 98–107)
CHOLESTEROL, TOTAL: 248 MG/DL
CO2: 23 MMOL/L (ref 22–29)
CREAT SERPL-MCNC: 0.9 MG/DL (ref 0.5–1)
EOSINOPHILS ABSOLUTE: 0.16 K/UL (ref 0.05–0.5)
EOSINOPHILS RELATIVE PERCENT: 3 % (ref 0–6)
GFR, ESTIMATED: 79 ML/MIN/1.73M2
GLUCOSE BLD-MCNC: 113 MG/DL (ref 74–99)
HBA1C MFR BLD: 5.8 % (ref 4–5.6)
HCT VFR BLD CALC: 43.9 % (ref 34–48)
HDLC SERPL-MCNC: 49 MG/DL
HEMOGLOBIN: 13.9 G/DL (ref 11.5–15.5)
IMMATURE GRANULOCYTES %: 0 % (ref 0–5)
IMMATURE GRANULOCYTES ABSOLUTE: <0.03 K/UL (ref 0–0.58)
LDL CHOLESTEROL: 171 MG/DL
LYMPHOCYTES ABSOLUTE: 1.7 K/UL (ref 1.5–4)
LYMPHOCYTES RELATIVE PERCENT: 37 % (ref 20–42)
MCH RBC QN AUTO: 30 PG (ref 26–35)
MCHC RBC AUTO-ENTMCNC: 31.7 G/DL (ref 32–34.5)
MCV RBC AUTO: 94.6 FL (ref 80–99.9)
MONOCYTES ABSOLUTE: 0.31 K/UL (ref 0.1–0.95)
MONOCYTES RELATIVE PERCENT: 7 % (ref 2–12)
NEUTROPHILS ABSOLUTE: 2.44 K/UL (ref 1.8–7.3)
NEUTROPHILS RELATIVE PERCENT: 53 % (ref 43–80)
PDW BLD-RTO: 14.4 % (ref 11.5–15)
PLATELET # BLD: 221 K/UL (ref 130–450)
PMV BLD AUTO: 11.4 FL (ref 7–12)
POTASSIUM SERPL-SCNC: 4.6 MMOL/L (ref 3.5–5)
RBC # BLD: 4.64 M/UL (ref 3.5–5.5)
SODIUM BLD-SCNC: 141 MMOL/L (ref 132–146)
T4 FREE: 1.2 NG/DL (ref 0.9–1.7)
TOTAL PROTEIN: 7.1 G/DL (ref 6.4–8.3)
TRIGL SERPL-MCNC: 142 MG/DL
TSH SERPL DL<=0.05 MIU/L-ACNC: 1.92 UIU/ML (ref 0.27–4.2)
VITAMIN D 25-HYDROXY: 89.9 NG/ML (ref 30–100)
VLDLC SERPL CALC-MCNC: 28 MG/DL
WBC # BLD: 4.6 K/UL (ref 4.5–11.5)

## 2024-09-18 ENCOUNTER — OFFICE VISIT (OUTPATIENT)
Dept: FAMILY MEDICINE CLINIC | Age: 59
End: 2024-09-18
Payer: COMMERCIAL

## 2024-09-18 VITALS
WEIGHT: 224 LBS | HEIGHT: 66 IN | HEART RATE: 78 BPM | RESPIRATION RATE: 14 BRPM | DIASTOLIC BLOOD PRESSURE: 72 MMHG | BODY MASS INDEX: 36 KG/M2 | TEMPERATURE: 97.2 F | OXYGEN SATURATION: 98 % | SYSTOLIC BLOOD PRESSURE: 110 MMHG

## 2024-09-18 DIAGNOSIS — M17.12 PRIMARY OSTEOARTHRITIS OF LEFT KNEE: ICD-10-CM

## 2024-09-18 DIAGNOSIS — G47.33 OSA (OBSTRUCTIVE SLEEP APNEA): ICD-10-CM

## 2024-09-18 DIAGNOSIS — R73.03 PREDIABETES: ICD-10-CM

## 2024-09-18 DIAGNOSIS — J45.40 MODERATE PERSISTENT ASTHMA WITHOUT COMPLICATION: ICD-10-CM

## 2024-09-18 DIAGNOSIS — Z01.818 PREOPERATIVE EXAMINATION: Primary | ICD-10-CM

## 2024-09-18 DIAGNOSIS — E78.5 DYSLIPIDEMIA: ICD-10-CM

## 2024-09-18 DIAGNOSIS — E03.9 ACQUIRED HYPOTHYROIDISM: ICD-10-CM

## 2024-09-18 DIAGNOSIS — E55.9 VITAMIN D DEFICIENCY: ICD-10-CM

## 2024-09-18 PROCEDURE — 93000 ELECTROCARDIOGRAM COMPLETE: CPT | Performed by: FAMILY MEDICINE

## 2024-09-18 PROCEDURE — 1036F TOBACCO NON-USER: CPT | Performed by: FAMILY MEDICINE

## 2024-09-18 PROCEDURE — 3017F COLORECTAL CA SCREEN DOC REV: CPT | Performed by: FAMILY MEDICINE

## 2024-09-18 PROCEDURE — 99214 OFFICE O/P EST MOD 30 MIN: CPT | Performed by: FAMILY MEDICINE

## 2024-09-18 PROCEDURE — G8427 DOCREV CUR MEDS BY ELIG CLIN: HCPCS | Performed by: FAMILY MEDICINE

## 2024-09-18 PROCEDURE — G8417 CALC BMI ABV UP PARAM F/U: HCPCS | Performed by: FAMILY MEDICINE

## 2024-09-18 ASSESSMENT — ENCOUNTER SYMPTOMS
BLOOD IN STOOL: 0
SORE THROAT: 0
COUGH: 0
SHORTNESS OF BREATH: 0

## 2024-09-18 ASSESSMENT — PATIENT HEALTH QUESTIONNAIRE - PHQ9
2. FEELING DOWN, DEPRESSED OR HOPELESS: NOT AT ALL
SUM OF ALL RESPONSES TO PHQ QUESTIONS 1-9: 0
3. TROUBLE FALLING OR STAYING ASLEEP: NOT AT ALL
SUM OF ALL RESPONSES TO PHQ9 QUESTIONS 1 & 2: 0
1. LITTLE INTEREST OR PLEASURE IN DOING THINGS: NOT AT ALL
SUM OF ALL RESPONSES TO PHQ QUESTIONS 1-9: 0
6. FEELING BAD ABOUT YOURSELF - OR THAT YOU ARE A FAILURE OR HAVE LET YOURSELF OR YOUR FAMILY DOWN: NOT AT ALL
SUM OF ALL RESPONSES TO PHQ QUESTIONS 1-9: 0
5. POOR APPETITE OR OVEREATING: NOT AT ALL
8. MOVING OR SPEAKING SO SLOWLY THAT OTHER PEOPLE COULD HAVE NOTICED. OR THE OPPOSITE, BEING SO FIGETY OR RESTLESS THAT YOU HAVE BEEN MOVING AROUND A LOT MORE THAN USUAL: NOT AT ALL
10. IF YOU CHECKED OFF ANY PROBLEMS, HOW DIFFICULT HAVE THESE PROBLEMS MADE IT FOR YOU TO DO YOUR WORK, TAKE CARE OF THINGS AT HOME, OR GET ALONG WITH OTHER PEOPLE: NOT DIFFICULT AT ALL
9. THOUGHTS THAT YOU WOULD BE BETTER OFF DEAD, OR OF HURTING YOURSELF: NOT AT ALL
SUM OF ALL RESPONSES TO PHQ QUESTIONS 1-9: 0
4. FEELING TIRED OR HAVING LITTLE ENERGY: NOT AT ALL
7. TROUBLE CONCENTRATING ON THINGS, SUCH AS READING THE NEWSPAPER OR WATCHING TELEVISION: NOT AT ALL

## 2024-09-23 ENCOUNTER — HOSPITAL ENCOUNTER (OUTPATIENT)
Dept: GENERAL RADIOLOGY | Age: 59
Discharge: HOME | End: 2024-09-25
Payer: COMMERCIAL

## 2024-09-23 VITALS — WEIGHT: 220 LBS | HEIGHT: 66 IN | BODY MASS INDEX: 35.36 KG/M2

## 2024-09-23 DIAGNOSIS — Z12.31 VISIT FOR SCREENING MAMMOGRAM: ICD-10-CM

## 2024-09-23 PROCEDURE — 77063 BREAST TOMOSYNTHESIS BI: CPT

## 2024-09-27 ENCOUNTER — TELEPHONE (OUTPATIENT)
Dept: FAMILY MEDICINE CLINIC | Age: 59
End: 2024-09-27

## 2024-10-04 DIAGNOSIS — J45.40 MODERATE PERSISTENT ASTHMA WITHOUT COMPLICATION: Chronic | ICD-10-CM

## 2024-10-04 RX ORDER — LEVOTHYROXINE SODIUM 50 UG/1
50 TABLET ORAL DAILY
Qty: 90 TABLET | Refills: 3 | OUTPATIENT
Start: 2024-10-04

## 2024-10-21 DIAGNOSIS — J45.41 MODERATE PERSISTENT ASTHMA WITH EXACERBATION: ICD-10-CM

## 2024-10-22 RX ORDER — ALBUTEROL SULFATE 90 UG/1
INHALANT RESPIRATORY (INHALATION)
Qty: 25.5 G | Refills: 3 | Status: SHIPPED | OUTPATIENT
Start: 2024-10-22

## 2024-10-22 NOTE — TELEPHONE ENCOUNTER
Requested Prescriptions     Pending Prescriptions Disp Refills    albuterol sulfate HFA (PROVENTIL;VENTOLIN;PROAIR) 108 (90 Base) MCG/ACT inhaler [Pharmacy Med Name: ALBUTEROL HFA 90MCG/ACT (PA)] 25.5 g 3     Sig: USE 2 INHALATIONS BY MOUTH INTO  THE LUNGS 4 TIMES DAILY AS  NEEDED FOR WHEEZING OR SHORTNESS OF BREATH       Next appt is 3/18/2025  Last appt was 9/18/2024

## 2024-10-31 DIAGNOSIS — J45.40 MODERATE PERSISTENT ASTHMA WITHOUT COMPLICATION: Chronic | ICD-10-CM

## 2024-11-01 ENCOUNTER — PATIENT MESSAGE (OUTPATIENT)
Dept: FAMILY MEDICINE CLINIC | Age: 59
End: 2024-11-01

## 2024-11-01 DIAGNOSIS — M25.50 ARTHRALGIA, UNSPECIFIED JOINT: Primary | ICD-10-CM

## 2024-11-01 RX ORDER — LEVOTHYROXINE SODIUM 50 UG/1
50 TABLET ORAL DAILY
Qty: 90 TABLET | Refills: 1 | Status: SHIPPED | OUTPATIENT
Start: 2024-11-01

## 2024-11-01 NOTE — TELEPHONE ENCOUNTER
Name of Medication(s) Requested:  Requested Prescriptions     Pending Prescriptions Disp Refills    levothyroxine (SYNTHROID) 50 MCG tablet [Pharmacy Med Name: Levothyroxine Sodium 50 MCG Oral Tablet] 90 tablet 3     Sig: TAKE 1 TABLET BY MOUTH DAILY       Medication is on current medication list Yes    Dosage and directions were verified? Yes    Quantity verified: 90 day supply     Pharmacy Verified?  Yes    Last Appointment:  9/18/2024    Future appts:  Future Appointments   Date Time Provider Department Center   3/18/2025  9:15 AM Jass Bui DO [facility]        (If no appt send self scheduling link. .REFILLAPPT)  Scheduling request sent?     [] Yes  [x] No    Does patient need updated?  [] Yes  [x] No

## 2024-11-05 RX ORDER — CELECOXIB 200 MG/1
200 CAPSULE ORAL DAILY
Qty: 90 CAPSULE | Refills: 1 | Status: SHIPPED
Start: 2024-11-05 | End: 2024-11-07

## 2024-11-08 RX ORDER — CELECOXIB 200 MG/1
200 CAPSULE ORAL DAILY
Qty: 90 CAPSULE | Refills: 1 | Status: SHIPPED | OUTPATIENT
Start: 2024-11-08

## 2024-12-11 ENCOUNTER — TELEPHONE (OUTPATIENT)
Dept: FAMILY MEDICINE CLINIC | Age: 59
End: 2024-12-11

## 2025-01-06 DIAGNOSIS — E03.9 ACQUIRED HYPOTHYROIDISM: Chronic | ICD-10-CM

## 2025-01-06 RX ORDER — BUDESONIDE AND FORMOTEROL FUMARATE DIHYDRATE 160; 4.5 UG/1; UG/1
AEROSOL RESPIRATORY (INHALATION)
Qty: 30.6 G | Refills: 1 | Status: SHIPPED | OUTPATIENT
Start: 2025-01-06

## 2025-01-06 NOTE — TELEPHONE ENCOUNTER
Name of Medication(s) Requested:  Requested Prescriptions     Pending Prescriptions Disp Refills    budesonide-formoterol (SYMBICORT) 160-4.5 MCG/ACT AERO [Pharmacy Med Name: Symbicort 160-4.5 MCG/ACT Inhalation Aerosol] 30.6 g 1     Sig: INHALE 2 INHALATIONS BY MOUTH  INTO THE LUNGS TWICE DAILY       Medication is on current medication list Yes    Dosage and directions were verified? Yes    Quantity verified: 90 day supply     Pharmacy Verified?  Yes    Last Appointment:  9/18/2024    Future appts:  Future Appointments   Date Time Provider Department Center   3/18/2025  9:15 AM Jass Bui DO [facility]        (If no appt send self scheduling link. .REFILLAPPT)  Scheduling request sent?     [] Yes  [x] No    Does patient need updated?  [] Yes  [x] No

## 2025-02-11 ENCOUNTER — OFFICE VISIT (OUTPATIENT)
Dept: FAMILY MEDICINE CLINIC | Age: 60
End: 2025-02-11

## 2025-02-11 VITALS
DIASTOLIC BLOOD PRESSURE: 75 MMHG | RESPIRATION RATE: 20 BRPM | WEIGHT: 231 LBS | HEIGHT: 66 IN | TEMPERATURE: 98.3 F | OXYGEN SATURATION: 97 % | SYSTOLIC BLOOD PRESSURE: 131 MMHG | HEART RATE: 79 BPM | BODY MASS INDEX: 37.12 KG/M2

## 2025-02-11 DIAGNOSIS — J45.901 EXACERBATION OF ASTHMA, UNSPECIFIED ASTHMA SEVERITY, UNSPECIFIED WHETHER PERSISTENT: Primary | ICD-10-CM

## 2025-02-11 DIAGNOSIS — J40 BRONCHITIS: ICD-10-CM

## 2025-02-11 RX ORDER — METHYLPREDNISOLONE 4 MG/1
TABLET ORAL
Qty: 1 KIT | Refills: 0 | Status: SHIPPED | OUTPATIENT
Start: 2025-02-11 | End: 2025-02-17

## 2025-02-11 RX ORDER — BUPROPION HYDROCHLORIDE 150 MG/1
150 TABLET ORAL EVERY MORNING
COMMUNITY

## 2025-02-11 RX ORDER — BROMPHENIRAMINE MALEATE, PSEUDOEPHEDRINE HYDROCHLORIDE, AND DEXTROMETHORPHAN HYDROBROMIDE 2; 30; 10 MG/5ML; MG/5ML; MG/5ML
5 SYRUP ORAL 4 TIMES DAILY PRN
Qty: 118 ML | Refills: 0 | Status: SHIPPED | OUTPATIENT
Start: 2025-02-11

## 2025-02-11 RX ORDER — BENZONATATE 100 MG/1
100 CAPSULE ORAL 3 TIMES DAILY PRN
Qty: 30 CAPSULE | Refills: 0 | Status: SHIPPED | OUTPATIENT
Start: 2025-02-11 | End: 2025-02-21

## 2025-02-11 RX ORDER — AZITHROMYCIN 250 MG/1
TABLET, FILM COATED ORAL
Qty: 6 TABLET | Refills: 0 | Status: SHIPPED | OUTPATIENT
Start: 2025-02-11 | End: 2025-02-21

## 2025-02-11 ASSESSMENT — PATIENT HEALTH QUESTIONNAIRE - PHQ9
SUM OF ALL RESPONSES TO PHQ QUESTIONS 1-9: 0
3. TROUBLE FALLING OR STAYING ASLEEP: NOT AT ALL
8. MOVING OR SPEAKING SO SLOWLY THAT OTHER PEOPLE COULD HAVE NOTICED. OR THE OPPOSITE, BEING SO FIGETY OR RESTLESS THAT YOU HAVE BEEN MOVING AROUND A LOT MORE THAN USUAL: NOT AT ALL
9. THOUGHTS THAT YOU WOULD BE BETTER OFF DEAD, OR OF HURTING YOURSELF: NOT AT ALL
10. IF YOU CHECKED OFF ANY PROBLEMS, HOW DIFFICULT HAVE THESE PROBLEMS MADE IT FOR YOU TO DO YOUR WORK, TAKE CARE OF THINGS AT HOME, OR GET ALONG WITH OTHER PEOPLE: NOT DIFFICULT AT ALL
SUM OF ALL RESPONSES TO PHQ9 QUESTIONS 1 & 2: 0
4. FEELING TIRED OR HAVING LITTLE ENERGY: NOT AT ALL
SUM OF ALL RESPONSES TO PHQ QUESTIONS 1-9: 0
2. FEELING DOWN, DEPRESSED OR HOPELESS: NOT AT ALL
5. POOR APPETITE OR OVEREATING: NOT AT ALL
SUM OF ALL RESPONSES TO PHQ QUESTIONS 1-9: 0
1. LITTLE INTEREST OR PLEASURE IN DOING THINGS: NOT AT ALL
7. TROUBLE CONCENTRATING ON THINGS, SUCH AS READING THE NEWSPAPER OR WATCHING TELEVISION: NOT AT ALL
6. FEELING BAD ABOUT YOURSELF - OR THAT YOU ARE A FAILURE OR HAVE LET YOURSELF OR YOUR FAMILY DOWN: NOT AT ALL
SUM OF ALL RESPONSES TO PHQ QUESTIONS 1-9: 0

## 2025-02-11 NOTE — PROGRESS NOTES
(8/7/2024)    Hunger Vital Sign     Worried About Running Out of Food in the Last Year: Never true     Ran Out of Food in the Last Year: Never true   Transportation Needs: Unknown (8/7/2024)    PRAPARE - Transportation     Lack of Transportation (Non-Medical): No   Housing Stability: Unknown (8/7/2024)    Housing Stability Vital Sign     Unstable Housing in the Last Year: No       OBJECTIVE    /75   Pulse 79   Temp 98.3 °F (36.8 °C) (Temporal)   Resp 20   Ht 1.676 m (5' 5.98\")   Wt 104.8 kg (231 lb)   SpO2 97%   BMI 37.30 kg/m²     Wt Readings from Last 3 Encounters:   02/11/25 104.8 kg (231 lb)   09/23/24 99.8 kg (220 lb)   09/18/24 101.6 kg (224 lb)       Physical Exam  The patient is in no apparent distress or diaphoresis.  Head is atraumatic and normocephalic. No tenderness over the maxillary or frontal sinuses. Tympanic membranes are normal with no bulging or purulent middle ear effusion.  No cervical lymphadenopathy in the neck.  No accessory muscle usage or respiratory distress in the chest. Patient is coughing somewhat frequently during exam. No wheezes, rales, or rhonchi. Diffusely coarse breath sounds noted in bilateral lung fields.  Heart has a regular rate and rhythm. Normal S1 and S2.    ASSESSMENT AND PLAN  Assessment & Plan  1. Potential walking pneumonia.  Her symptoms and physical examination findings suggest a possible diagnosis of walking pneumonia, a specific bacterial infection. She reports a persistent cough, drainage, and shortness of breath, which have not significantly improved with the use of her rescue inhaler. On examination, there are no wheezes, rales, or rhonchi, but diffusely coarse breath sounds are noted. A prescription for azithromycin and a Medrol Dosepak will be sent to [facility]. She is advised to take Tessalon Perles and Bromfed as needed for cough and congestion. She should see improvement within 24 to 48 hours. Rest and hydration are recommended. If symptoms

## 2025-02-17 SDOH — ECONOMIC STABILITY: FOOD INSECURITY: WITHIN THE PAST 12 MONTHS, THE FOOD YOU BOUGHT JUST DIDN'T LAST AND YOU DIDN'T HAVE MONEY TO GET MORE.: NEVER TRUE

## 2025-02-17 SDOH — ECONOMIC STABILITY: TRANSPORTATION INSECURITY
IN THE PAST 12 MONTHS, HAS LACK OF TRANSPORTATION KEPT YOU FROM MEETINGS, WORK, OR FROM GETTING THINGS NEEDED FOR DAILY LIVING?: NO

## 2025-02-17 SDOH — ECONOMIC STABILITY: INCOME INSECURITY: IN THE LAST 12 MONTHS, WAS THERE A TIME WHEN YOU WERE NOT ABLE TO PAY THE MORTGAGE OR RENT ON TIME?: NO

## 2025-02-17 SDOH — ECONOMIC STABILITY: FOOD INSECURITY: WITHIN THE PAST 12 MONTHS, YOU WORRIED THAT YOUR FOOD WOULD RUN OUT BEFORE YOU GOT MONEY TO BUY MORE.: NEVER TRUE

## 2025-02-17 SDOH — ECONOMIC STABILITY: TRANSPORTATION INSECURITY
IN THE PAST 12 MONTHS, HAS THE LACK OF TRANSPORTATION KEPT YOU FROM MEDICAL APPOINTMENTS OR FROM GETTING MEDICATIONS?: NO

## 2025-02-18 ENCOUNTER — OFFICE VISIT (OUTPATIENT)
Dept: FAMILY MEDICINE CLINIC | Age: 60
End: 2025-02-18
Payer: COMMERCIAL

## 2025-02-18 VITALS
TEMPERATURE: 97.2 F | DIASTOLIC BLOOD PRESSURE: 84 MMHG | WEIGHT: 232 LBS | RESPIRATION RATE: 16 BRPM | BODY MASS INDEX: 37.28 KG/M2 | SYSTOLIC BLOOD PRESSURE: 130 MMHG | HEIGHT: 66 IN | OXYGEN SATURATION: 98 % | HEART RATE: 78 BPM

## 2025-02-18 DIAGNOSIS — J01.00 ACUTE MAXILLARY SINUSITIS, RECURRENCE NOT SPECIFIED: Primary | ICD-10-CM

## 2025-02-18 PROCEDURE — 3017F COLORECTAL CA SCREEN DOC REV: CPT | Performed by: FAMILY MEDICINE

## 2025-02-18 PROCEDURE — 1036F TOBACCO NON-USER: CPT | Performed by: FAMILY MEDICINE

## 2025-02-18 PROCEDURE — 99213 OFFICE O/P EST LOW 20 MIN: CPT | Performed by: FAMILY MEDICINE

## 2025-02-18 PROCEDURE — G8417 CALC BMI ABV UP PARAM F/U: HCPCS | Performed by: FAMILY MEDICINE

## 2025-02-18 PROCEDURE — G8427 DOCREV CUR MEDS BY ELIG CLIN: HCPCS | Performed by: FAMILY MEDICINE

## 2025-02-18 RX ORDER — DOXYCYCLINE HYCLATE 100 MG
100 TABLET ORAL 2 TIMES DAILY
Qty: 20 TABLET | Refills: 0 | Status: SHIPPED | OUTPATIENT
Start: 2025-02-18 | End: 2025-02-28

## 2025-02-18 NOTE — PROGRESS NOTES
Lauren West   Patient is a 59 y.o. year old female who presents with:  Chief Complaint   Patient presents with    Pneumonia     Still not better after completing medication; was better for like one day; breathing better but  fatigue and no appetite       History of Present Illness  The patient is a 59-year-old female who presents for evaluation of a respiratory infection.    She reports persistent symptoms of a respiratory infection, which initially presented in mid-January. Despite a brief period of improvement following the administration of Medrol Dosepak and Z-Lebron, her condition has not significantly improved. She experienced a transient improvement in her breathing, but continues to suffer from sinus pain, postnasal drainage, and coughing. The cough has shown some improvement over the past two days. She also reports new onset of sinus pressure, which was not previously present. Her breathing has improved, requiring only one use of her rescue inhaler. However, she experienced a recurrence of breathlessness on Sunday, accompanied by a sensation of an obstruction in her airway, leading to a coughing episode. She also reports fatigue, which was not present prior to the onset of her current symptoms. She describes her current condition as similar to her previous state, with no new or worsening symptoms. She has been experiencing right-sided drainage throughout her illness, but the sinus pressure is a new symptom. She has a known allergy to PENICILLIN and has not previously taken doxycycline. She reports a general feeling of malaise, decreased appetite, and increased fatigue. She was prescribed Wellbutrin 3 weeks ago, which she was informed could affect her appetite. She underwent hip replacement surgery in 02/2024 and knee surgery in 10/2024. She was attending physical therapy twice a week and performing lifting exercises, but now feels unable to continue these activities due to her fatigue. She reports a

## 2025-03-03 ENCOUNTER — APPOINTMENT (OUTPATIENT)
Dept: GENERAL RADIOLOGY | Age: 60
End: 2025-03-03
Payer: COMMERCIAL

## 2025-03-03 ENCOUNTER — HOSPITAL ENCOUNTER (EMERGENCY)
Age: 60
Discharge: HOME | End: 2025-03-03
Payer: COMMERCIAL

## 2025-03-03 VITALS
TEMPERATURE: 98.6 F | DIASTOLIC BLOOD PRESSURE: 85 MMHG | HEART RATE: 78 BPM | RESPIRATION RATE: 18 BRPM | BODY MASS INDEX: 37.14 KG/M2 | WEIGHT: 230 LBS | SYSTOLIC BLOOD PRESSURE: 137 MMHG | OXYGEN SATURATION: 98 %

## 2025-03-03 DIAGNOSIS — R06.00 DYSPNEA, UNSPECIFIED TYPE: Primary | ICD-10-CM

## 2025-03-03 PROCEDURE — 71046 X-RAY EXAM CHEST 2 VIEWS: CPT

## 2025-03-03 PROCEDURE — 99211 OFF/OP EST MAY X REQ PHY/QHP: CPT

## 2025-03-03 ASSESSMENT — PAIN SCALES - GENERAL: PAINLEVEL_OUTOF10: 0

## 2025-03-03 ASSESSMENT — PAIN - FUNCTIONAL ASSESSMENT: PAIN_FUNCTIONAL_ASSESSMENT: 0-10

## 2025-03-03 NOTE — ED PROVIDER NOTES
Department of Emergency Medicine   ED  Encounter Note  Admit Date/RoomTime: 3/3/2025 10:23 AM  ED Room:     NAME: Lauren West  : 1965  MRN: [id number]     Chief Complaint:  Shortness of Breath (Since . Has seen PCP twice and put on ATB and Steroids has an appointment tomorrow) and Fatigue    History of Present Illness      Lauren West is a 59 y.o. old female who presents to urgent care by private vehicle with still present of SOB with exertion which began 6 week(s) prior to arrival.   Her symptoms are associated with nothing is like she is wheezing.  She has generalized fatigue.  There has been no chest pain, hemoptysis, leg edema.  She has been seen by her PCP twice and completed 2 course of antibiotics and 2 courses of steroids with no improvement.  ROS   Pertinent positives and negatives are stated within HPI, all other systems reviewed and are negative.    Past Medical History:  has a past medical history of Anxiety, Asthma, Chávez's esophagus, Depression, GERD (gastroesophageal reflux disease), H/O cardiovascular stress test, Sleep apnea, and Thyroid disease.    Surgical History:  has a past surgical history that includes Appendectomy (); Hysterectomy; shoulder surgery (Right); Ankle surgery (Left); Cholecystectomy (); Hand surgery (Left); Colonoscopy; Endoscopy, colon, diagnostic; Knee arthroscopy (Left, 2020); Knee arthroscopy (Left, 2020); Breast surgery; Total knee arthroplasty (Right, 2022); Breast biopsy; hip surgery (2024); Hysterectomy, total abdominal; and Knee Arthroplasty (10/2024).    Social History:  reports that she has never smoked. She has never used smokeless tobacco. She reports current alcohol use of about 1.0 standard drink of alcohol per week. She reports that she does not use drugs.    Family History: family history includes Alcohol Abuse in her brother and father; Atrial Fibrillation in her father; COPD in her brother; Cancer

## 2025-03-04 ENCOUNTER — OFFICE VISIT (OUTPATIENT)
Dept: FAMILY MEDICINE CLINIC | Age: 60
End: 2025-03-04
Payer: COMMERCIAL

## 2025-03-04 VITALS
DIASTOLIC BLOOD PRESSURE: 73 MMHG | BODY MASS INDEX: 37.12 KG/M2 | OXYGEN SATURATION: 96 % | RESPIRATION RATE: 18 BRPM | HEIGHT: 66 IN | WEIGHT: 231 LBS | HEART RATE: 78 BPM | TEMPERATURE: 97.4 F | SYSTOLIC BLOOD PRESSURE: 124 MMHG

## 2025-03-04 DIAGNOSIS — J45.41 MODERATE PERSISTENT ASTHMA WITH EXACERBATION: ICD-10-CM

## 2025-03-04 DIAGNOSIS — J45.41 MODERATE PERSISTENT ASTHMA WITH EXACERBATION: Primary | ICD-10-CM

## 2025-03-04 PROCEDURE — 99213 OFFICE O/P EST LOW 20 MIN: CPT | Performed by: FAMILY MEDICINE

## 2025-03-04 PROCEDURE — G8417 CALC BMI ABV UP PARAM F/U: HCPCS | Performed by: FAMILY MEDICINE

## 2025-03-04 PROCEDURE — G8427 DOCREV CUR MEDS BY ELIG CLIN: HCPCS | Performed by: FAMILY MEDICINE

## 2025-03-04 PROCEDURE — 1036F TOBACCO NON-USER: CPT | Performed by: FAMILY MEDICINE

## 2025-03-04 PROCEDURE — 3017F COLORECTAL CA SCREEN DOC REV: CPT | Performed by: FAMILY MEDICINE

## 2025-03-04 RX ORDER — MONTELUKAST SODIUM 10 MG/1
10 TABLET ORAL DAILY
Qty: 90 TABLET | OUTPATIENT
Start: 2025-03-04

## 2025-03-04 RX ORDER — BROMPHENIRAMINE MALEATE, PSEUDOEPHEDRINE HYDROCHLORIDE, AND DEXTROMETHORPHAN HYDROBROMIDE 2; 30; 10 MG/5ML; MG/5ML; MG/5ML
5 SYRUP ORAL 4 TIMES DAILY PRN
Qty: 1 EACH | Refills: 0 | Status: SHIPPED | OUTPATIENT
Start: 2025-03-04

## 2025-03-04 RX ORDER — PREDNISONE 20 MG/1
20 TABLET ORAL 2 TIMES DAILY
Qty: 10 TABLET | Refills: 0 | Status: SHIPPED | OUTPATIENT
Start: 2025-03-04 | End: 2025-03-09

## 2025-03-04 RX ORDER — MONTELUKAST SODIUM 10 MG/1
10 TABLET ORAL DAILY
Qty: 30 TABLET | Refills: 0 | Status: SHIPPED | OUTPATIENT
Start: 2025-03-04

## 2025-03-04 RX ORDER — FLUTICASONE FUROATE, UMECLIDINIUM BROMIDE AND VILANTEROL TRIFENATATE 100; 62.5; 25 UG/1; UG/1; UG/1
1 POWDER RESPIRATORY (INHALATION) DAILY
Qty: 1 EACH | Refills: 2 | Status: SHIPPED | OUTPATIENT
Start: 2025-03-04

## 2025-03-04 NOTE — PROGRESS NOTES
brompheniramine-pseudoephedrine-DM 2-30-10 MG/5ML syrup; Take 5 mLs by mouth 4 times daily as needed for Congestion or Cough, Disp-1 each, R-0Normal  -     predniSONE (DELTASONE) 20 MG tablet; Take 1 tablet by mouth 2 times daily for 5 days, Disp-10 tablet, R-0Normal     Return for follow-up, or sooner if needed.     Jass Bui,   03/04/25  12:41 PM    There are no Patient Instructions on file for this visit.    The patient (or guardian, if applicable) and other individuals in attendance with the patient were advised that Artificial Intelligence will be utilized during this visit to record, process the conversation to generate a clinical note, and support improvement of the AI technology. The patient (or guardian, if applicable) and other individuals in attendance at the appointment consented to the use of AI, including the recording.

## 2025-03-13 DIAGNOSIS — E03.9 ACQUIRED HYPOTHYROIDISM: ICD-10-CM

## 2025-03-13 DIAGNOSIS — R73.03 PREDIABETES: ICD-10-CM

## 2025-03-13 DIAGNOSIS — E55.9 VITAMIN D DEFICIENCY: ICD-10-CM

## 2025-03-13 DIAGNOSIS — Z01.818 PREOPERATIVE EXAMINATION: ICD-10-CM

## 2025-03-13 DIAGNOSIS — J45.40 MODERATE PERSISTENT ASTHMA WITHOUT COMPLICATION: ICD-10-CM

## 2025-03-13 DIAGNOSIS — G47.33 OSA (OBSTRUCTIVE SLEEP APNEA): ICD-10-CM

## 2025-03-13 DIAGNOSIS — M17.12 PRIMARY OSTEOARTHRITIS OF LEFT KNEE: ICD-10-CM

## 2025-03-13 DIAGNOSIS — E78.5 DYSLIPIDEMIA: ICD-10-CM

## 2025-03-13 LAB
ALBUMIN: 4.7 G/DL (ref 3.5–5.2)
ALP BLD-CCNC: 65 U/L (ref 35–104)
ALT SERPL-CCNC: 24 U/L (ref 0–32)
ANION GAP SERPL CALCULATED.3IONS-SCNC: 18 MMOL/L (ref 7–16)
AST SERPL-CCNC: 21 U/L (ref 0–31)
BASOPHILS ABSOLUTE: 0.03 K/UL (ref 0–0.2)
BASOPHILS RELATIVE PERCENT: 0 % (ref 0–2)
BILIRUB SERPL-MCNC: 0.5 MG/DL (ref 0–1.2)
BUN BLDV-MCNC: 20 MG/DL (ref 6–20)
CALCIUM SERPL-MCNC: 9.7 MG/DL (ref 8.6–10.2)
CHLORIDE BLD-SCNC: 102 MMOL/L (ref 98–107)
CHOLESTEROL, TOTAL: 261 MG/DL
CO2: 22 MMOL/L (ref 22–29)
CREAT SERPL-MCNC: 0.9 MG/DL (ref 0.5–1)
EOSINOPHILS ABSOLUTE: 0.26 K/UL (ref 0.05–0.5)
EOSINOPHILS RELATIVE PERCENT: 4 % (ref 0–6)
GFR, ESTIMATED: 70 ML/MIN/1.73M2
GLUCOSE BLD-MCNC: 123 MG/DL (ref 74–99)
HBA1C MFR BLD: 6.1 % (ref 4–5.6)
HCT VFR BLD CALC: 46.7 % (ref 34–48)
HDLC SERPL-MCNC: 52 MG/DL
HEMOGLOBIN: 14.8 G/DL (ref 11.5–15.5)
IMMATURE GRANULOCYTES %: 0 % (ref 0–5)
IMMATURE GRANULOCYTES ABSOLUTE: <0.03 K/UL (ref 0–0.58)
LDL CHOLESTEROL: 165 MG/DL
LYMPHOCYTES ABSOLUTE: 2.96 K/UL (ref 1.5–4)
LYMPHOCYTES RELATIVE PERCENT: 43 % (ref 20–42)
MCH RBC QN AUTO: 30 PG (ref 26–35)
MCHC RBC AUTO-ENTMCNC: 31.7 G/DL (ref 32–34.5)
MCV RBC AUTO: 94.7 FL (ref 80–99.9)
MONOCYTES ABSOLUTE: 0.54 K/UL (ref 0.1–0.95)
MONOCYTES RELATIVE PERCENT: 8 % (ref 2–12)
NEUTROPHILS ABSOLUTE: 3.02 K/UL (ref 1.8–7.3)
NEUTROPHILS RELATIVE PERCENT: 44 % (ref 43–80)
PDW BLD-RTO: 14.2 % (ref 11.5–15)
PLATELET # BLD: 270 K/UL (ref 130–450)
PMV BLD AUTO: 11.1 FL (ref 7–12)
POTASSIUM SERPL-SCNC: 4.9 MMOL/L (ref 3.5–5)
RBC # BLD: 4.93 M/UL (ref 3.5–5.5)
SODIUM BLD-SCNC: 142 MMOL/L (ref 132–146)
T4 FREE: 1.4 NG/DL (ref 0.9–1.7)
TOTAL PROTEIN: 7.5 G/DL (ref 6.4–8.3)
TRIGL SERPL-MCNC: 220 MG/DL
TSH SERPL DL<=0.05 MIU/L-ACNC: 3.45 UIU/ML (ref 0.27–4.2)
VITAMIN D 25-HYDROXY: 89.3 NG/ML (ref 30–100)
VLDLC SERPL CALC-MCNC: 44 MG/DL
WBC # BLD: 6.8 K/UL (ref 4.5–11.5)

## 2025-03-18 ENCOUNTER — OFFICE VISIT (OUTPATIENT)
Dept: FAMILY MEDICINE CLINIC | Age: 60
End: 2025-03-18
Payer: COMMERCIAL

## 2025-03-18 VITALS
HEART RATE: 81 BPM | DIASTOLIC BLOOD PRESSURE: 77 MMHG | RESPIRATION RATE: 18 BRPM | TEMPERATURE: 97.3 F | HEIGHT: 66 IN | BODY MASS INDEX: 36.8 KG/M2 | OXYGEN SATURATION: 98 % | WEIGHT: 229 LBS | SYSTOLIC BLOOD PRESSURE: 117 MMHG

## 2025-03-18 DIAGNOSIS — E66.1 CLASS 2 DRUG-INDUCED OBESITY WITH SERIOUS COMORBIDITY AND BODY MASS INDEX (BMI) OF 36.0 TO 36.9 IN ADULT: ICD-10-CM

## 2025-03-18 DIAGNOSIS — J30.9 ALLERGIC RHINITIS, UNSPECIFIED SEASONALITY, UNSPECIFIED TRIGGER: Primary | ICD-10-CM

## 2025-03-18 DIAGNOSIS — E55.9 VITAMIN D DEFICIENCY: ICD-10-CM

## 2025-03-18 DIAGNOSIS — B37.0 THRUSH: ICD-10-CM

## 2025-03-18 DIAGNOSIS — Z76.0 MEDICATION REFILL: ICD-10-CM

## 2025-03-18 DIAGNOSIS — E03.9 ACQUIRED HYPOTHYROIDISM: ICD-10-CM

## 2025-03-18 DIAGNOSIS — K11.7 XEROSTOMIA: ICD-10-CM

## 2025-03-18 DIAGNOSIS — J45.40 MODERATE PERSISTENT ASTHMA WITHOUT COMPLICATION: Chronic | ICD-10-CM

## 2025-03-18 DIAGNOSIS — E66.812 CLASS 2 DRUG-INDUCED OBESITY WITH SERIOUS COMORBIDITY AND BODY MASS INDEX (BMI) OF 36.0 TO 36.9 IN ADULT: ICD-10-CM

## 2025-03-18 DIAGNOSIS — M25.50 ARTHRALGIA, UNSPECIFIED JOINT: ICD-10-CM

## 2025-03-18 PROCEDURE — 99214 OFFICE O/P EST MOD 30 MIN: CPT | Performed by: FAMILY MEDICINE

## 2025-03-18 PROCEDURE — G8417 CALC BMI ABV UP PARAM F/U: HCPCS | Performed by: FAMILY MEDICINE

## 2025-03-18 PROCEDURE — G8427 DOCREV CUR MEDS BY ELIG CLIN: HCPCS | Performed by: FAMILY MEDICINE

## 2025-03-18 PROCEDURE — 1036F TOBACCO NON-USER: CPT | Performed by: FAMILY MEDICINE

## 2025-03-18 PROCEDURE — 3017F COLORECTAL CA SCREEN DOC REV: CPT | Performed by: FAMILY MEDICINE

## 2025-03-18 RX ORDER — CLOTRIMAZOLE 10 MG/1
10 LOZENGE ORAL
Qty: 50 TABLET | Refills: 0 | Status: SHIPPED | OUTPATIENT
Start: 2025-03-18 | End: 2025-03-28

## 2025-03-18 RX ORDER — LEVOTHYROXINE SODIUM 50 UG/1
50 TABLET ORAL DAILY
Qty: 90 TABLET | Refills: 1 | Status: SHIPPED | OUTPATIENT
Start: 2025-03-18

## 2025-03-18 RX ORDER — CELECOXIB 200 MG/1
200 CAPSULE ORAL DAILY
Qty: 90 CAPSULE | Refills: 1 | Status: SHIPPED | OUTPATIENT
Start: 2025-03-18

## 2025-03-18 RX ORDER — EPINEPHRINE 0.3 MG/.3ML
0.3 INJECTION SUBCUTANEOUS ONCE
Qty: 0.3 ML | Refills: 2 | Status: SHIPPED | OUTPATIENT
Start: 2025-03-18 | End: 2025-03-18

## 2025-03-18 NOTE — PROGRESS NOTES
Lauren West   Patient is a 59 y.o. year old female who presents with:  Chief Complaint   Patient presents with    6 Month Follow-Up    Sleep Apnea     Doing well on cpap; machine works well    Asthma     Discuss Trelegy and allergies     Patient also follows with: Ortho, urology, GYN, psych, GI     History of Present Illness  The patient is a 59-year-old female who presents for a 6-month follow-up and a more recent follow-up related to a possible infection treated with antibiotics and steroids, given her underlying asthma.    She has transitioned from Symbicort to Trelegy, which she reports as being more effective. She also mentions an outdoor cat that she owns, which she suspects may be contributing to her symptoms. She conducted a personal experiment by avoiding contact with the cat for several days, during which she experienced improvement. However, upon resuming contact with the cat, she noticed a recurrence of wheezing. She also owns a dog. Singulair was previously tried but discontinued due to adverse effects. She is interested in undergoing allergen testing and is considering allergy injections. She is also contemplating a referral to a pulmonologist.    She has been experiencing oral discomfort, characterized by a burning sensation in her mouth and peeling lips, for approximately 6 months. She is uncertain if these symptoms are related to her inhaler use. Her dentist has ruled out reflux as a potential cause. She also reports dry mouth and eyes, necessitating constant water intake. She has previously undergone an autoimmune workup, which yielded normal results. She has consulted a rheumatologist less than 6 months ago.    She expresses frustration with her weight management efforts. She has previously worked with a  and followed a ketogenic diet, but these interventions did not yield significant results. She has undergone 2 knee replacements and a hip replacement. She believes her cortisol

## 2025-04-13 ENCOUNTER — HOSPITAL ENCOUNTER (EMERGENCY)
Age: 60
Discharge: HOME | End: 2025-04-13
Payer: COMMERCIAL

## 2025-04-13 VITALS
TEMPERATURE: 97.7 F | WEIGHT: 225 LBS | DIASTOLIC BLOOD PRESSURE: 94 MMHG | HEART RATE: 82 BPM | BODY MASS INDEX: 36.33 KG/M2 | OXYGEN SATURATION: 89 % | RESPIRATION RATE: 20 BRPM | SYSTOLIC BLOOD PRESSURE: 121 MMHG

## 2025-04-13 DIAGNOSIS — J01.00 ACUTE NON-RECURRENT MAXILLARY SINUSITIS: Primary | ICD-10-CM

## 2025-04-13 LAB
SPECIMEN SOURCE: NORMAL
STREP A, MOLECULAR: NEGATIVE

## 2025-04-13 PROCEDURE — 99211 OFF/OP EST MAY X REQ PHY/QHP: CPT

## 2025-04-13 PROCEDURE — 87651 STREP A DNA AMP PROBE: CPT

## 2025-04-13 RX ORDER — PREDNISONE 20 MG/1
20 TABLET ORAL DAILY
Qty: 5 TABLET | Refills: 0 | Status: SHIPPED | OUTPATIENT
Start: 2025-04-13 | End: 2025-04-18

## 2025-04-13 RX ORDER — AZITHROMYCIN 250 MG/1
TABLET, FILM COATED ORAL
Qty: 1 PACKET | Refills: 0 | Status: SHIPPED | OUTPATIENT
Start: 2025-04-13 | End: 2025-04-17

## 2025-04-13 ASSESSMENT — PAIN - FUNCTIONAL ASSESSMENT: PAIN_FUNCTIONAL_ASSESSMENT: 0-10

## 2025-04-13 ASSESSMENT — PAIN SCALES - GENERAL: PAINLEVEL_OUTOF10: 0

## 2025-04-13 NOTE — ED PROVIDER NOTES
Independent JOSEMANUEL Visit.         [facility] URGENT CARE  ED  Encounter Note  Admit Date/RoomTime: 2025  2:03 PM  ED Room:   NAME: Lauren West  : 1965  MRN: [id number]  PCP: Jass Bui DO    CHIEF COMPLAINT     Pharyngitis (Sore throat, ear pressure, headache for 3 days. )    HISTORY OF PRESENT ILLNESS        Lauren West is a 59 y.o. female who presents to the ED sore throat for 3 days and ear pressure. Pt also has a headache off and on. Pt has taken Tylenol with a little improvement.     REVIEW OF SYSTEMS     Pertinent positives and negatives are stated within HPI, all other systems reviewed and are negative.    Past Medical History:  has a past medical history of Anxiety, Asthma, Chávez's esophagus, Depression, GERD (gastroesophageal reflux disease), H/O cardiovascular stress test, Sleep apnea, and Thyroid disease.  Surgical History:  has a past surgical history that includes Appendectomy (); Hysterectomy; shoulder surgery (Right); Ankle surgery (Left); Cholecystectomy (); Hand surgery (Left); Colonoscopy; Endoscopy, colon, diagnostic; Knee arthroscopy (Left, 2020); Knee arthroscopy (Left, 2020); Breast surgery; Total knee arthroplasty (Right, 2022); Breast biopsy; hip surgery (2024); Hysterectomy, total abdominal; and Knee Arthroplasty (10/2024).  Social History:  reports that she has never smoked. She has never used smokeless tobacco. She reports current alcohol use of about 1.0 standard drink of alcohol per week. She reports that she does not use drugs.  Family History: family history includes Alcohol Abuse in her brother and father; Atrial Fibrillation in her father; COPD in her brother; Cancer in her father; Cancer (age of onset: 54) in her brother; Colon Cancer (age of onset: 52) in her father; Hypertension in her father; No Known Problems in her mother; Other in her father.   Allergies: Sulfa antibiotics, Vancomycin, and

## 2025-04-15 ENCOUNTER — OFFICE VISIT (OUTPATIENT)
Dept: FAMILY MEDICINE CLINIC | Age: 60
End: 2025-04-15
Payer: COMMERCIAL

## 2025-04-15 VITALS
HEART RATE: 83 BPM | TEMPERATURE: 97.4 F | BODY MASS INDEX: 36.48 KG/M2 | HEIGHT: 66 IN | RESPIRATION RATE: 18 BRPM | DIASTOLIC BLOOD PRESSURE: 81 MMHG | OXYGEN SATURATION: 97 % | SYSTOLIC BLOOD PRESSURE: 129 MMHG | WEIGHT: 227 LBS

## 2025-04-15 DIAGNOSIS — J06.9 UPPER RESPIRATORY TRACT INFECTION, UNSPECIFIED TYPE: Primary | ICD-10-CM

## 2025-04-15 PROCEDURE — 1036F TOBACCO NON-USER: CPT | Performed by: FAMILY MEDICINE

## 2025-04-15 PROCEDURE — G8417 CALC BMI ABV UP PARAM F/U: HCPCS | Performed by: FAMILY MEDICINE

## 2025-04-15 PROCEDURE — G8427 DOCREV CUR MEDS BY ELIG CLIN: HCPCS | Performed by: FAMILY MEDICINE

## 2025-04-15 PROCEDURE — 3017F COLORECTAL CA SCREEN DOC REV: CPT | Performed by: FAMILY MEDICINE

## 2025-04-15 PROCEDURE — 99213 OFFICE O/P EST LOW 20 MIN: CPT | Performed by: FAMILY MEDICINE

## 2025-04-15 NOTE — PROGRESS NOTES
Lauren West   Patient is a 59 y.o. year old female who presents with:  Chief Complaint   Patient presents with    Ear Pain     Went to Urgent care Sunday and was given ATX for sinus inf; still has rina ear pain, face pain and sinus issues; was prescribed a Zpack and started it yestrday; also prescribed prednisone but never started it     History of Present Illness  The patient is a 59-year-old female who presents for an acute visit due to upper respiratory infection (URI) symptoms.    She reports experiencing severe pharyngitis, bilateral otalgia, and facial pain and pressure for the past 5 days. Tenderness in the throat and a sensation of swelling are also noted. Minimal coughing is reported. Medical attention was sought at an urgent care facility on Sunday, where a diagnosis of acute nonrecurrent maxillary sinusitis was made. A strep test was conducted, returning negative results. Prescriptions included Z-Yuriy and prednisone 20 mg for 5 days. The prednisone treatment was not initiated due to a scheduled allergy test today at 11:00 AM. The Z-Yuriy regimen was started yesterday morning at 7:00 AM, but no improvement in symptoms has been observed. Previous use of Keflex has been tolerated without adverse reactions. Concerns about potential side effects of Trelegy led to its discontinuation today.    Increased swelling and discomfort in replacement joints have been observed.    Results  Labs   - Strep test: Negative       Review of Systems    Health Maintenance Due   Topic Date Due    Hepatitis B vaccine (1 of 3 - 19+ 3-dose series) Never done    Pneumococcal 50+ years Vaccine (2 of 2 - PCV) 07/24/2019    COVID-19 Vaccine (1 - 2024-25 season) Never done       Current Outpatient Medications   Medication Sig Dispense Refill    azithromycin (ZITHROMAX Z-YURIY) 250 MG tablet Take 2 tablets (500 mg) on Day 1, and then take 1 tablet (250 mg) on days 2 through 5. 1 packet 0    predniSONE (DELTASONE) 20 MG tablet Take 1  Declined

## 2025-04-16 ENCOUNTER — TELEPHONE (OUTPATIENT)
Dept: BARIATRICS/WEIGHT MGMT | Age: 60
End: 2025-04-16

## 2025-04-16 NOTE — TELEPHONE ENCOUNTER
A first call was made in an attempt to reach this patient for a referral we received. I spoke with the patient who stated that she wanted to hold off.    Referral closed

## 2025-04-29 DIAGNOSIS — J45.41 MODERATE PERSISTENT ASTHMA WITH EXACERBATION: ICD-10-CM

## 2025-04-30 RX ORDER — ALBUTEROL SULFATE 90 UG/1
INHALANT RESPIRATORY (INHALATION)
Qty: 25.5 G | Refills: 3 | Status: SHIPPED | OUTPATIENT
Start: 2025-04-30

## 2025-04-30 NOTE — TELEPHONE ENCOUNTER
Name of Medication(s) Requested:  Requested Prescriptions     Pending Prescriptions Disp Refills    albuterol sulfate HFA (PROVENTIL;VENTOLIN;PROAIR) 108 (90 Base) MCG/ACT inhaler [Pharmacy Med Name: ALBUTEROL HFA 90MCG/ACT (PA)] 25.5 g 3     Sig: USE 2 INHALATIONS BY MOUTH INTO  THE LUNGS 4 TIMES DAILY AS  NEEDED FOR WHEEZING OR SHORTNESS OF BREATH       Medication is on current medication list Yes    Dosage and directions were verified? Yes    Quantity verified: 90 day supply     Pharmacy Verified?  Yes    Last Appointment:  4/15/2025    Future appts:  Future Appointments   Date Time Provider Department Center   10/16/2025  8:15 AM Jass Bui DO [facility] DEP        (If no appt send self scheduling link. .REFILLAPPT)  Scheduling request sent?     [] Yes  [x] No    Does patient need updated?  [] Yes  [x] No

## 2025-05-16 ENCOUNTER — OFFICE VISIT (OUTPATIENT)
Dept: FAMILY MEDICINE CLINIC | Age: 60
End: 2025-05-16
Payer: COMMERCIAL

## 2025-05-16 VITALS
TEMPERATURE: 97.4 F | DIASTOLIC BLOOD PRESSURE: 80 MMHG | OXYGEN SATURATION: 98 % | RESPIRATION RATE: 19 BRPM | SYSTOLIC BLOOD PRESSURE: 131 MMHG | HEIGHT: 66 IN | HEART RATE: 80 BPM | BODY MASS INDEX: 36.48 KG/M2 | WEIGHT: 227 LBS

## 2025-05-16 DIAGNOSIS — H57.89 REDNESS OF RIGHT EYE: ICD-10-CM

## 2025-05-16 DIAGNOSIS — H57.11 PAIN OF RIGHT EYE: Primary | ICD-10-CM

## 2025-05-16 PROCEDURE — G8417 CALC BMI ABV UP PARAM F/U: HCPCS

## 2025-05-16 PROCEDURE — 3017F COLORECTAL CA SCREEN DOC REV: CPT

## 2025-05-16 PROCEDURE — G8427 DOCREV CUR MEDS BY ELIG CLIN: HCPCS

## 2025-05-16 PROCEDURE — 1036F TOBACCO NON-USER: CPT

## 2025-05-16 PROCEDURE — 99213 OFFICE O/P EST LOW 20 MIN: CPT

## 2025-05-16 ASSESSMENT — VISUAL ACUITY: OU: 1

## 2025-05-16 NOTE — PROGRESS NOTES
Chief Complaint   Eye Problem (Right eye is painful red, and feels like something is in it)      History of Present Illness   Source of history provided by:  patient.     Lauren West is a 59 y.o. old female presenting to the walk in clinic for evaluation of redness, irritation to the right eye for the past 2 days. Pt states she got out of the shower and noticed her eye was irritated. Pt states she feels like there is something in her eye but denies having a known foreign body. Pt states eyes are irritated when fans are on. Denies had a recent URI within the past week. Denies contact lens use. Denies any visual changes, visual loss, HA, ear pain, fever, chills, N/V, neck pain, bleeding, or lethargy. Pt states she used lubricating drops which did not help.     ROS    Unless otherwise stated in this report or unable to obtain because of the patient's clinical or mental status as evidenced by the medical record, this patients's positive and negative responses for Review of Systems, constitutional, psych, eyes, ENT, cardiovascular, respiratory, gastrointestinal, neurological, genitourinary, musculoskeletal, integument systems and systems related to the presenting problem are either stated in the preceding or were not pertinent or were negative for the symptoms and/or complaints related to the medical problem.    Physical Exam         VS:  /80   Pulse 80   Temp 97.4 °F (36.3 °C)   Resp 19   Ht 1.676 m (5' 5.98\")   Wt 103 kg (227 lb)   SpO2 98%   BMI 36.66 kg/m²    Oxygen Saturation Interpretation: Normal.    Physical Exam  Vitals and nursing note reviewed.   Constitutional:       General: She is not in acute distress.     Appearance: Normal appearance. She is well-developed and normal weight. She is not ill-appearing or toxic-appearing.   HENT:      Head: Normocephalic and atraumatic.      Right Ear: Hearing and external ear normal.      Left Ear: Hearing and external ear normal.      Nose: Nose

## 2025-05-30 LAB
HONEY BEE IGE QN: <0.1 KU/L (ref 0–0.34)
PAPER WASP IGE QN: <0.1 KU/L (ref 0–0.34)
WHITEFACED HORNET IGE QN: <0.1 KU/L (ref 0–0.34)
YELLOW HORNET IGE QN: <0.1 KU/L (ref 0–0.34)
YELLOW JACKET IGE QN: <0.1 KU/L (ref 0–0.34)

## 2025-07-15 DIAGNOSIS — J45.41 MODERATE PERSISTENT ASTHMA WITH EXACERBATION: ICD-10-CM

## 2025-07-15 RX ORDER — MONTELUKAST SODIUM 10 MG/1
10 TABLET ORAL DAILY
Qty: 90 TABLET | Refills: 0 | Status: SHIPPED | OUTPATIENT
Start: 2025-07-15

## 2025-07-15 NOTE — TELEPHONE ENCOUNTER
Name of Medication(s) Requested:  Requested Prescriptions     Pending Prescriptions Disp Refills    montelukast (SINGULAIR) 10 MG tablet [Pharmacy Med Name: MONTELUKAST 10MG TABLETS] 90 tablet 0     Sig: TAKE 1 TABLET BY MOUTH DAILY       Medication is on current medication list Yes and No    Dosage and directions were verified? Yes    Quantity verified: 90 day supply     Pharmacy Verified?  Yes    Last Appointment:  4/15/2025    Future appts:  Future Appointments   Date Time Provider Department Center   10/16/2025  8:15 AM Jass Bui DO [facility] DEP        (If no appt send self scheduling link. .REFILLAPPT)  Scheduling request sent?     [] Yes  [x] No    Does patient need updated?  [] Yes  [x] No

## 2025-08-31 DIAGNOSIS — M25.50 ARTHRALGIA, UNSPECIFIED JOINT: ICD-10-CM

## 2025-08-31 DIAGNOSIS — J45.40 MODERATE PERSISTENT ASTHMA WITHOUT COMPLICATION: Chronic | ICD-10-CM

## 2025-09-02 RX ORDER — LEVOTHYROXINE SODIUM 50 UG/1
50 TABLET ORAL DAILY
Qty: 90 TABLET | Refills: 0 | Status: SHIPPED | OUTPATIENT
Start: 2025-09-02

## 2025-09-02 RX ORDER — CELECOXIB 200 MG/1
200 CAPSULE ORAL DAILY
Qty: 90 CAPSULE | Refills: 0 | Status: SHIPPED | OUTPATIENT
Start: 2025-09-02

## (undated) DEVICE — SPONGE,LAP,12"X12",XR,ST,5/PK,40PK/CS: Brand: MEDLINE

## (undated) DEVICE — GOWN SURG XL LNG LEN SPUNBOND REINF VELC TIE LEV 4 IMPERV

## (undated) DEVICE — PADDING CAST W4INXL4YD NONSTERILE COT COHESIVE HND TEARABLE

## (undated) DEVICE — Z DISCONTINUED USE 2275686 GLOVE SURG SZ 8 L12IN FNGR THK13MIL WHT ISOLEX POLYISOPRENE

## (undated) DEVICE — Z DISCONTINUED USE 2272124 DRAPE SURG XL N INVASIVE 2 LAYR DISP

## (undated) DEVICE — ELECTRODE PT RET AD L9FT HI MOIST COND ADH HYDRGEL CORDED

## (undated) DEVICE — GAUZE,SPONGE,4"X4",16PLY,STRL,LF,10/TRAY: Brand: MEDLINE

## (undated) DEVICE — TUBING, SUCTION, 1/4" X 10', STRAIGHT: Brand: MEDLINE

## (undated) DEVICE — GLOVE SURG 7 LTX TRIFLEX WIDE FINGER PWDR

## (undated) DEVICE — PENCIL ES L3M BTTN SWCH HOLSTER W/ BLDE ELECTRD EDGE

## (undated) DEVICE — SET EXTN L14IN 1ML IV L BOR NO FLTR NO STPCOCK

## (undated) DEVICE — PADDING,UNDERCAST,COTTON, 4"X4YD STERILE: Brand: MEDLINE

## (undated) DEVICE — PITCHER PT 1200ML W HNDL CSR WRP

## (undated) DEVICE — PEN: MARKING STD 100/CS: Brand: MEDICAL ACTION INDUSTRIES

## (undated) DEVICE — PACK,EXTREMITY,ORTHOMAX,5/CS: Brand: MEDLINE

## (undated) DEVICE — CANNULA IV 18GA L15IN BLNT FILL LUERLOCK HUB MJCT

## (undated) DEVICE — READY WET SKIN SCRUB TRAY-LF: Brand: MEDLINE INDUSTRIES, INC.

## (undated) DEVICE — STRIP,CLOSURE,WOUND,MEDI-STRIP,1/2X4: Brand: MEDLINE

## (undated) DEVICE — MASTISOL ADHESIVE LIQ 2/3ML

## (undated) DEVICE — COTTON ROLL,1 LB: Brand: CURITY

## (undated) DEVICE — NEEDLE FLTR 18GA L1.5IN MEM THK5UM BLNT DISP

## (undated) DEVICE — BANDAGE COMPR W6INXL5YD SELF ADH COHESIVE CO FLX

## (undated) DEVICE — SMARTGOWN BREATHABLE SPECIALTY GOWN: Brand: CONVERTORS

## (undated) DEVICE — HALF SHEET: Brand: CONVERTORS

## (undated) DEVICE — BLADE CLIPPER GEN PURP NS

## (undated) DEVICE — ENCORE® LATEX TEXTURED SIZE 8, STERILE LATEX POWDER-FREE SURGICAL GLOVE: Brand: ENCORE

## (undated) DEVICE — DOUBLE BASIN SET: Brand: MEDLINE INDUSTRIES, INC.

## (undated) DEVICE — PACK PROC ORTH LO EXT IX CUST

## (undated) DEVICE — SYRINGE MED 30ML STD CLR PLAS LUERLOCK TIP N CTRL DISP

## (undated) DEVICE — GOWN,SIRUS,FABRNF,XL,20/CS: Brand: MEDLINE

## (undated) DEVICE — SYRINGE IRRIG 60ML SFT PLIABLE BLB EZ TO GRP 1 HND USE W/

## (undated) DEVICE — Y-TYPE TUR IRRIGATION SET

## (undated) DEVICE — NDL CNTR 40CT FM MAG: Brand: MEDLINE INDUSTRIES, INC.

## (undated) DEVICE — 3M™ STERI-STRIP™ REINFORCED ADHESIVE SKIN CLOSURES, R1541, 1/4 IN X 3 IN (6 MM X 75 MM), 3 STRIPS/ENVELOPE: Brand: 3M™ STERI-STRIP™

## (undated) DEVICE — SHEET SUPPORT

## (undated) DEVICE — 3M™ STERI-DRAPE™ U-DRAPE 1015: Brand: STERI-DRAPE™

## (undated) DEVICE — SOLUTION IV IRRIG POUR BRL 0.9% SODIUM CHL 2F7124

## (undated) DEVICE — Z DISCONTINUED PER MEDLINE USE 2741944 DRESSING AQUACEL 12 IN SURG W9XL30CM SIL CVR WTRPRF VIR BACT BARR ANTIMIC

## (undated) DEVICE — STERILE VELCLOSE ELASTIC BANDAGE, 4IN X 10 YARDS: Brand: VELCLOSE

## (undated) DEVICE — Z DISCONTINUED NO SUB IDED GLOVE SURG BEAD CUF 8 STD PF WHT STRL TRIUMPH LT LTX

## (undated) DEVICE — SYRINGE MED 50ML LUERLOCK TIP

## (undated) DEVICE — COVER,TABLE,60X90,STERILE: Brand: MEDLINE

## (undated) DEVICE — DRESSING GZ XRFRM 4X4(25/BX 6BX/CS)

## (undated) DEVICE — INTENDED FOR TISSUE SEPARATION, AND OTHER PROCEDURES THAT REQUIRE A SHARP SURGICAL BLADE TO PUNCTURE OR CUT.: Brand: BARD-PARKER ® STAINLESS STEEL BLADES

## (undated) DEVICE — GOWN,SIRUS,POLYRNF,BRTHSLV,XLN/XL,20/CS: Brand: MEDLINE

## (undated) DEVICE — MEDI-VAC NON-CONDUCTIVE SUCTION TUBING: Brand: CARDINAL HEALTH

## (undated) DEVICE — STANDARD HYPODERMIC NEEDLE,POLYPROPYLENE HUB: Brand: MONOJECT

## (undated) DEVICE — BLADE SHV L13CM DIA4MM TAPR TIP SCIS LIKE CUT OVL OUTER

## (undated) DEVICE — PAD,ABDOMINAL,8"X10",ST,LF: Brand: MEDLINE

## (undated) DEVICE — 3M™ COBAN™ SELF-ADHERENT WRAP, 1586S, STERILE, 6 IN X 5 YD (15 CM X 4,5 M), 12 ROLLS/CASE: Brand: 3M™ COBAN™

## (undated) DEVICE — SOLUTION SURG PREP POV IOD 7.5% 4 OZ

## (undated) DEVICE — 12 ML SYRINGE,LUER-LOCK TIP: Brand: MONOJECT

## (undated) DEVICE — BANDAGE COMPR L W4INXL11YD 100% COT WVN E DBL LEN CLP CLSR

## (undated) DEVICE — 1810 FOAM BLOCK NEEDLE COUNTER: Brand: DEVON

## (undated) DEVICE — COVER,LIGHT HANDLE,FLX,1/PK: Brand: MEDLINE INDUSTRIES, INC.

## (undated) DEVICE — BLADE SAW SAG NAR THCK LNG 12.5MM

## (undated) DEVICE — STRYKER PERFORMANCE SERIES SAGITTAL BLADE: Brand: STRYKER PERFORMANCE SERIES

## (undated) DEVICE — BANDAGE COMPR W6XL12FT SGL LAYERED NO CLSR EXSANGUATION

## (undated) DEVICE — TOWEL,OR,DSP,ST,BLUE,STD,6/PK,12PK/CS: Brand: MEDLINE

## (undated) DEVICE — MEDI-VAC YANKAUER SUCTION HANDLE: Brand: CARDINAL HEALTH

## (undated) DEVICE — NEEDLE HYPO 18GA L1.5IN PNK POLYPR HUB S STL THN WALL FILL

## (undated) DEVICE — GAUZE,SPONGE,4"X4",16PLY,XRAY,STRL,LF: Brand: MEDLINE

## (undated) DEVICE — BANDAGE COMPR W6INXL12FT SMOOTH FOR LIMB EXSANG ESMARCH

## (undated) DEVICE — SET MAJOR INSTR ORTHO

## (undated) DEVICE — MARKER,SKIN,WI/RULER AND LABELS: Brand: MEDLINE